# Patient Record
Sex: FEMALE | Race: BLACK OR AFRICAN AMERICAN | Employment: FULL TIME | URBAN - METROPOLITAN AREA
[De-identification: names, ages, dates, MRNs, and addresses within clinical notes are randomized per-mention and may not be internally consistent; named-entity substitution may affect disease eponyms.]

---

## 2017-01-06 ENCOUNTER — TELEPHONE (OUTPATIENT)
Dept: INTERNAL MEDICINE CLINIC | Age: 52
End: 2017-01-06

## 2017-01-06 RX ORDER — AZITHROMYCIN 250 MG/1
TABLET, FILM COATED ORAL
Qty: 6 TAB | Refills: 0 | Status: SHIPPED | OUTPATIENT
Start: 2017-01-06 | End: 2017-01-11

## 2017-01-06 NOTE — TELEPHONE ENCOUNTER
Pt called back. Thyroid labs from Dr. Ozzie Hammans and PTH nml. Multinodular goiter with ? Enlarged parathyroid gland. REferred to [facility]. Also remains sick. Sore throat, hoarse and cough since prior to Xmas. Saw Brengel. Taking saline ns, mucinex, cough syrup etc.  Added zpack.

## 2017-03-09 LAB — CREATININE, EXTERNAL: 0.64

## 2017-04-11 ENCOUNTER — OFFICE VISIT (OUTPATIENT)
Dept: INTERNAL MEDICINE CLINIC | Age: 52
End: 2017-04-11

## 2017-04-11 VITALS
BODY MASS INDEX: 26.03 KG/M2 | TEMPERATURE: 98.7 F | HEIGHT: 66 IN | HEART RATE: 83 BPM | WEIGHT: 162 LBS | OXYGEN SATURATION: 98 % | RESPIRATION RATE: 17 BRPM | DIASTOLIC BLOOD PRESSURE: 82 MMHG | SYSTOLIC BLOOD PRESSURE: 130 MMHG

## 2017-04-11 DIAGNOSIS — I10 ESSENTIAL HYPERTENSION: Primary | ICD-10-CM

## 2017-04-11 DIAGNOSIS — J30.1 SEASONAL ALLERGIC RHINITIS DUE TO POLLEN: ICD-10-CM

## 2017-04-11 RX ORDER — LORATADINE 10 MG/1
10 TABLET ORAL
COMMUNITY
End: 2021-04-30 | Stop reason: ALTCHOICE

## 2017-04-11 NOTE — MR AVS SNAPSHOT
Visit Information Date & Time Provider Department Dept. Phone Encounter #  
 4/11/2017  3:00 PM Anmol Posada MD [facility] [phone] [id number] Your Appointments 9/7/2017  9:20 AM  
COMPLETE 40 with Anmol Posada MD  
[facility] [address]) Appt Note: [address] [facility] [address]  
[facility] [address] [facility] [place] [address] [facility] [id number] Upcoming Health Maintenance Date Due  
 PAP AKA CERVICAL CYTOLOGY 10/23/2018 BREAST CANCER SCRN MAMMOGRAM 11/3/2018 COLONOSCOPY 12/21/2021 DTaP/Tdap/Td series (2 - Td) 2/12/2026 Allergies as of 4/11/2017  Review Complete On: 4/11/2017 By: Anmol Posada MD  
  
 Severity Noted Reaction Type Reactions Naprosyn [Naproxen] Medium 06/09/2014   Side Effect Other (comments) GI distress and loose stools Hydrochlorothiazide  12/21/2011    Other (comments) Lightheaded/dizzy Macrobid [Nitrofurantoin Monohyd/m-cryst]  12/22/2011    Nausea Only Sulfa (Sulfonamide Antibiotics)  12/21/2011    Unknown (comments) Current Immunizations  Never Reviewed Name Date Tdap 2/12/2016 Not reviewed this visit You Were Diagnosed With   
  
 Codes Comments Essential hypertension    -  Primary ICD-10-CM: I10 
ICD-9-CM: 401.9 Vitals BP Pulse Temp Resp Height(growth percentile) Weight(growth percentile) 130/82 83 98.7 °F (37.1 °C) (Oral) 17 5' 6\" (1.676 m) 162 lb (73.5 kg) SpO2 BMI OB Status Smoking Status 98% 26.15 kg/m2 Menopause Never Smoker Vitals History BMI and BSA Data Body Mass Index Body Surface Area  
 26.15 kg/m 2 1.85 m 2 Preferred Pharmacy Pharmacy Name Phone [id number] [place], [address] Sheila Chávez [phone] Your Updated Medication List  
  
   
 This list is accurate as of: 17  3:44 PM.  Always use your most recent med list.  
  
  
  
  
 ALPRAZolam 0.5 mg tablet Commonly known as:  Norma Baker Take 0.5-1 Tabs by mouth two (2) times daily as needed for Anxiety. Max Daily Amount: 1 mg. CLARITIN 10 mg tablet Generic drug:  loratadine Take 10 mg by mouth.  
  
 lisinopril 40 mg tablet Commonly known as:  PRINIVIL, ZESTRIL  
TAKE 1 TABLET BY MOUTH ONCE DAILY  
  
 multivitamin tablet Commonly known as:  ONE A DAY Take 1 Tab by mouth daily. NASACORT AQ 55 mcg nasal inhaler Generic drug:  triamcinolone 2 Sprays daily. We Performed the Following LIPID PANEL [04039 CPT(R)] METABOLIC PANEL, COMPREHENSIVE [10023 CPT(R)] Patient Instructions MyChart Activation Thank you for requesting access to SpaBooker. Please follow the instructions below to securely access and download your online medical record. SpaBooker allows you to send messages to your doctor, view your test results, renew your prescriptions, schedule appointments, and more. How Do I Sign Up? 1. In your internet browser, go to [email or web address] 
2. Click on the First Time User? Click Here link in the Sign In box. You will be redirect to the New Member Sign Up page. 3. Enter your SpaBooker Access Code exactly as it appears below. You will not need to use this code after youve completed the sign-up process. If you do not sign up before the expiration date, you must request a new code. SpaBooker Access Code: [email or web address] Expires: 7/10/2017  3:07 PM (This is the date your SpaBooker access code will ) 4. Enter the last four digits of your Social Security Number (xxxx) and Date of Birth (mm/dd/yyyy) as indicated and click Submit. You will be taken to the next sign-up page. 5. Create a SpaBooker ID. This will be your SpaBooker login ID and cannot be changed, so think of one that is secure and easy to remember. 6. Create a [employer]RS[employer] password. You can change your password at any time. 7. Enter your Password Reset Question and Answer. This can be used at a later time if you forget your password. 8. Enter your e-mail address. You will receive e-mail notification when new information is available in [address] 9. Click Sign Up. You can now view and download portions of your medical record. 10. Click the Download Summary menu link to download a portable copy of your medical information. Additional Information If you have questions, please visit the Frequently Asked Questions section of the [employer] website at [email or web address] Remember, [employer]oupe is NOT to be used for urgent needs. For medical emergencies, dial 911. Introducing [facility]! Arun Vee introduces [employer] patient portal. Now you can access parts of your medical record, email your doctor's office, and request medication refills online. 1. In your internet browser, go to [email or web address] 2. Click on the First Time User? Click Here link in the Sign In box. You will see the New Member Sign Up page. 3. Enter your TRSB [employer]oupe Access Code exactly as it appears below. You will not need to use this code after youve completed the sign-up process. If you do not sign up before the expiration date, you must request a new code. · [employer] Access Code: [email or web address] Expires: 7/10/2017  3:07 PM 
 
4. Enter the last four digits of your Social Security Number (xxxx) and Date of Birth (mm/dd/yyyy) as indicated and click Submit. You will be taken to the next sign-up page. 5. Create a T[employer]B Groupe ID. This will be your T[employer] login ID and cannot be changed, so think of one that is secure and easy to remember. 6. Create a [employer]oupe password. You can change your password at any time. 7. Enter your Password Reset Question and Answer. This can be used at a later time if you forget your password. 8. Enter your e-mail address. You will receive e-mail notification when new information is available in [address] 9. Click Sign Up. You can now view and download portions of your medical record. 10. Click the Download Summary menu link to download a portable copy of your medical information. If you have questions, please visit the Frequently Asked Questions section of the PeerPong website. Remember, PeerPong is NOT to be used for urgent needs. For medical emergencies, dial 911. Now available from your iPhone and Android! Please provide this summary of care documentation to your next provider. Your primary care clinician is listed as NICHOLE ORR. If you have any questions after today's visit, please call [phone].

## 2017-04-11 NOTE — PROGRESS NOTES
Reviewed record in preparation for visit and have obtained necessary documentation. Identified pt with two pt identifiers(name and ). There are no preventive care reminders to display for this patient. Chief Complaint   Patient presents with    Other     needs form filled out and wants to have her lungs rechecked        Wt Readings from Last 3 Encounters:   17 162 lb (73.5 kg)   16 161 lb (73 kg)   16 161 lb 12.8 oz (73.4 kg)     Temp Readings from Last 3 Encounters:   17 98.7 °F (37.1 °C) (Oral)   16 98.8 °F (37.1 °C) (Oral)   16 98.9 °F (37.2 °C) (Oral)     BP Readings from Last 3 Encounters:   17 152/87   16 142/90   16 152/90     Pulse Readings from Last 3 Encounters:   17 83   16 95   16 83           Learning Assessment:  :     Learning Assessment 2017   PRIMARY LEARNER Patient Patient Patient Patient   HIGHEST LEVEL OF EDUCATION - PRIMARY LEARNER  4 YEARS OF COLLEGE 4 YEARS OF COLLEGE 4 YEARS OF COLLEGE 4 YEARS OF COLLEGE   BARRIERS PRIMARY LEARNER NONE NONE NONE NONE   CO-LEARNER CAREGIVER No No No -   PRIMARY LANGUAGE ENGLISH ENGLISH ENGLISH ENGLISH    NEED No No - -   LEARNER PREFERENCE PRIMARY LISTENING DEMONSTRATION READING READING   ANSWERED BY patient patient patient self   RELATIONSHIP SELF SELF SELF SELF       Depression Screening:  :     PHQ 2 / 9, over the last two weeks 2017   Little interest or pleasure in doing things Not at all   Feeling down, depressed or hopeless Not at all   Total Score PHQ 2 0       Fall Risk Assessment:  :     No flowsheet data found. Abuse Screening:  :     Abuse Screening Questionnaire 2017   Do you ever feel afraid of your partner? N N N   Are you in a relationship with someone who physically or mentally threatens you? N N N   Is it safe for you to go home?  [facility]       Coordination of Care Questionnaire:  : 1) Have you been to an emergency room, urgent care clinic since your last visit? no   Hospitalized since your last visit? no             2) Have you seen or consulted any other health care providers outside of [address] [place] [address] since your last visit? no  (Include any pap smears or colon screenings in this section.)    3) Do you have an Advance Directive on file? no    4) Are you interested in receiving information on Advance Directives? YES      Patient is accompanied by self I have received verbal consent from Taylor Leos to discuss any/all medical information while they are present in the room.

## 2017-04-11 NOTE — PROGRESS NOTES
HISTORY OF PRESENT ILLNESS  Sibley Jacinta is a 46 y.o. female. HPI   Had bad respiratory infection. Coughed from end of December until March. Better now.  :Last month could still have some mucous when coughed. Back to exercise without huerta/sob.  nml stamina. Had f/u with Dr. Isabel Li re multinodular thyroid and ? Enlarged parathyroid gland. Lab work normal.  Following with us yearly. Needs form completed for work.   htn - has been checking at home. Running 130/80. Denies chest pain or sob. Current Outpatient Prescriptions:     loratadine (CLARITIN) 10 mg tablet, Take 10 mg by mouth., Disp: , Rfl:     multivitamin (ONE A DAY) tablet, Take 1 Tab by mouth daily. , Disp: , Rfl:     ALPRAZolam (XANAX) 0.5 mg tablet, Take 0.5-1 Tabs by mouth two (2) times daily as needed for Anxiety. Max Daily Amount: 1 mg., Disp: 30 Tab, Rfl: 1    lisinopril (PRINIVIL, ZESTRIL) 40 mg tablet, TAKE 1 TABLET BY MOUTH ONCE DAILY, Disp: 90 Tab, Rfl: 3    triamcinolone (NASACORT AQ) 55 mcg nasal inhaler, 2 Sprays daily. , Disp: , Rfl:     Visit Vitals    /82    Pulse 83    Temp 98.7 °F (37.1 °C) (Oral)    Resp 17    Ht 5' 6\" (1.676 m)    Wt 162 lb (73.5 kg)    SpO2 98%    BMI 26.15 kg/m2     ROS  See above  Physical Exam   Constitutional: She appears well-developed and well-nourished. HENT:   Head: Normocephalic and atraumatic. Nares - edematous with erythematous  OP - post nasal drainage   Neck: Neck supple. Cardiovascular: Normal rate, regular rhythm and normal heart sounds. Exam reveals no gallop and no friction rub. No murmur heard. Pulmonary/Chest: Effort normal and breath sounds normal.   Musculoskeletal: She exhibits no edema. Lymphadenopathy:     She has no cervical adenopathy. Vitals reviewed. ASSESSMENT and PLAN  HTN - controlled, cont same. Check labs  Allergic rhinitis - symptoms increasing. Restart prn meds. 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       Orders Placed This Encounter    METABOLIC PANEL, COMPREHENSIVE    LIPID PANEL    loratadine (CLARITIN) 10 mg tablet     Follow-up Disposition: Not on File

## 2017-04-11 NOTE — PATIENT INSTRUCTIONS
Astro Gaming Activation    Thank you for requesting access to [employer]. Please follow the instructions below to securely access and download your online medical record. Ast[employer] Gaming allows you to send messages to your doctor, view your test results, renew your prescriptions, schedule appointments, and more. How Do I Sign Up? 1. In your internet browser, go to [email or web address]  2. Click on the First Time User? Click Here link in the Sign In box. You will be redirect to the New Member Sign Up page. 3. Enter your Astro Gaming Access Code exactly as it appears below. You will not need to use this code after youve completed the sign-up process. If you do not sign up before the expiration date, you must request a new code. Astro Gaming Access Code: [email or web address]  Expires: 7/10/2017  3:07 PM (This is the date your Astro Gaming access code will )    4. Enter the last four digits of your Social Security Number (xxxx) and Date of Birth (mm/dd/yyyy) as indicated and click Submit. You will be taken to the next sign-up page. 5. Create a Astro Gaming ID. This will be your Astro Gaming login ID and cannot be changed, so think of one that is secure and easy to remember. 6. Create a Astro Gaming password. You can change your password at any time. 7. Enter your Password Reset Question and Answer. This can be used at a later time if you forget your password. 8. Enter your e-mail address. You will receive e-mail notification when new information is available in [address] 9. Click Sign Up. You can now view and download portions of your medical record. 10. Click the Download Summary menu link to download a portable copy of your medical information. Additional Information    If you have questions, please visit the Frequently Asked Questions section of the Astro Gaming website at [email or web address] [email or web address] Remember, Astro Gaming is NOT to be used for urgent needs. For medical emergencies, dial 911.

## 2017-05-04 ENCOUNTER — OFFICE VISIT (OUTPATIENT)
Dept: INTERNAL MEDICINE CLINIC | Age: 52
End: 2017-05-04

## 2017-05-04 VITALS
SYSTOLIC BLOOD PRESSURE: 150 MMHG | OXYGEN SATURATION: 98 % | HEART RATE: 88 BPM | DIASTOLIC BLOOD PRESSURE: 87 MMHG | WEIGHT: 160.2 LBS | TEMPERATURE: 98.6 F | RESPIRATION RATE: 16 BRPM | HEIGHT: 66 IN | BODY MASS INDEX: 25.75 KG/M2

## 2017-05-04 DIAGNOSIS — J01.10 ACUTE NON-RECURRENT FRONTAL SINUSITIS: ICD-10-CM

## 2017-05-04 DIAGNOSIS — M62.838 NECK MUSCLE SPASM: Primary | ICD-10-CM

## 2017-05-04 RX ORDER — DICLOFENAC SODIUM 75 MG/1
75 TABLET, DELAYED RELEASE ORAL 2 TIMES DAILY
Qty: 40 TAB | Refills: 1 | Status: SHIPPED | OUTPATIENT
Start: 2017-05-04 | End: 2017-12-23 | Stop reason: SDUPTHER

## 2017-05-04 RX ORDER — RANITIDINE 300 MG/1
TABLET ORAL
Refills: 5 | COMMUNITY
Start: 2017-04-09 | End: 2019-11-11 | Stop reason: ALTCHOICE

## 2017-05-04 RX ORDER — CYCLOBENZAPRINE HCL 10 MG
10 TABLET ORAL
Qty: 20 TAB | Refills: 0 | Status: SHIPPED | OUTPATIENT
Start: 2017-05-04 | End: 2017-09-07 | Stop reason: ALTCHOICE

## 2017-05-04 RX ORDER — AZITHROMYCIN 250 MG/1
TABLET, FILM COATED ORAL
Qty: 6 TAB | Refills: 0 | Status: SHIPPED | OUTPATIENT
Start: 2017-05-04 | End: 2017-05-09

## 2017-05-04 NOTE — PATIENT INSTRUCTIONS
Neck: Exercises  Your Care Instructions  Here are some examples of typical rehabilitation exercises for your condition. Start each exercise slowly. Ease off the exercise if you start to have pain. Your doctor or physical therapist will tell you when you can start these exercises and which ones will work best for you. How to do the exercises  Note: Stretching should make you feel a gentle stretch, but no pain. Stop any strengthening exercise that makes pain worse. Neck stretch    1. This stretch works best if you keep your shoulder down as you lean away from it. To help you remember to do this, start by relaxing your shoulders and lightly holding on to your thighs or your chair. 2. Tilt your head toward your shoulder and hold for 15 to 30 seconds. Let the weight of your head stretch your muscles. 3. If you would like a little added stretch, use your hand to gently and steadily pull your head toward your shoulder. For example, keeping your right shoulder down, lean your head to the left. 4. Repeat 2 to 4 times toward each shoulder. Diagonal neck stretch    1. Turn your head slightly toward the direction you will be stretching, and tilt your head diagonally toward your chest and hold for 15 to 30 seconds. 2. If you would like a little added stretch, use your hand to gently and steadily pull your head forward on the diagonal.  3. Repeat 2 to 4 times toward each side. Dorsal glide stretch    1. Sit or stand tall and look straight ahead. 2. Slowly tuck your chin as you glide your head backward over your body  3. Hold for a count of 6, and then relax for up to 10 seconds. 4. Repeat 8 to 12 times. Note: The dorsal glide stretches the back of the neck. If you feel pain, do not glide so far back. Some people find this exercise easier to do while lying on their backs with an ice pack on the neck. Chest and shoulder stretch    1.  Sit or stand tall and glide your head backward as in the dorsal glide stretch. 2. Raise both arms so that your hands are next to your ears. 3. Take a deep breath, and as you breathe out, lower your elbows down and behind your back. You will feel your shoulder blades slide down and together, and at the same time you will feel a stretch across your chest and the front of your shoulders. 4. Hold for about 6 seconds, and then relax for up to 10 seconds. 5. Repeat 8 to 12 times. Strengthening: Hands on head    1. Move your head backward, forward, and side to side against gentle pressure from your hands, holding each position for about 6 seconds. 2. Repeat 8 to 12 times. Follow-up care is a key part of your treatment and safety. Be sure to make and go to all appointments, and call your doctor if you are having problems. It's also a good idea to know your test results and keep a list of the medicines you take. Where can you learn more? Go to http://jackie-stacey.info/. Enter P975 in the search box to learn more about \"Neck: Exercises. \"  Current as of: May 23, 2016  Content Version: 11.2  © 8118-1179 [employer]. Care instructions adapted under license by [employer] (which disclaims liability or warranty for this information). If you have questions about a medical condition or this instruction, always ask your healthcare professional. [address] any warranty or liability for your use of this information.

## 2017-05-04 NOTE — PROGRESS NOTES
Reviewed record in preparation for visit and have obtained necessary documentation. Identified pt with two pt identifiers(name and ). There are no preventive care reminders to display for this patient. Chief Complaint   Patient presents with    Neck Pain     shouler/ear pain,feels like she has swelling in the ear and dizziness        Wt Readings from Last 3 Encounters:   17 160 lb 3.2 oz (72.7 kg)   17 162 lb (73.5 kg)   16 161 lb (73 kg)     Temp Readings from Last 3 Encounters:   17 98.6 °F (37 °C) (Oral)   17 98.7 °F (37.1 °C) (Oral)   16 98.8 °F (37.1 °C) (Oral)     BP Readings from Last 3 Encounters:   17 150/87   17 130/82   16 142/90     Pulse Readings from Last 3 Encounters:   17 88   17 83   16 95           Learning Assessment:  :     Learning Assessment 2017   PRIMARY LEARNER Patient Patient Patient Patient   HIGHEST LEVEL OF EDUCATION - PRIMARY LEARNER  4 YEARS OF COLLEGE 4 YEARS OF COLLEGE 4 YEARS OF COLLEGE 4 YEARS OF COLLEGE   BARRIERS PRIMARY LEARNER NONE NONE NONE NONE   CO-LEARNER CAREGIVER No No No -   PRIMARY LANGUAGE ENGLISH ENGLISH ENGLISH ENGLISH    NEED No No - -   LEARNER PREFERENCE PRIMARY LISTENING DEMONSTRATION READING READING   ANSWERED BY patient patient patient self   RELATIONSHIP SELF SELF SELF SELF       Depression Screening:  :     PHQ over the last two weeks 2017   Little interest or pleasure in doing things Not at all   Feeling down, depressed or hopeless Not at all   Total Score PHQ 2 0       Fall Risk Assessment:  :     No flowsheet data found. Abuse Screening:  :     Abuse Screening Questionnaire 2017   Do you ever feel afraid of your partner? N N N   Are you in a relationship with someone who physically or mentally threatens you? N N N   Is it safe for you to go home?  Roderick Martin       Coordination of Care Questionnaire:  :     1) Have you been to an emergency room, urgent care clinic since your last visit? no   Hospitalized since your last visit? no             2) Have you seen or consulted any other health care providers outside of [address] [place] [address] since your last visit? no  (Include any pap smears or colon screenings in this section.)    3) Do you have an Advance Directive on file? no    4) Are you interested in receiving information on Advance Directives? YES      Patient is accompanied by self I have received verbal consent from Sienna Nowak to discuss any/all medical information while they are present in the room.

## 2017-05-04 NOTE — MR AVS SNAPSHOT
Visit Information Date & Time Provider Department Dept. Phone Encounter #  
 5/4/2017 11:40 AM Trudi Rosas MD [facility] [phone] [id number] Your Appointments 9/7/2017  9:20 AM  
COMPLETE 40 with Trudi Rosas MD  
[facility] [address]) Appt Note: [address] [facility] [address]  
[facility] [address] [facility] [address] [facility] [phone] Upcoming Health Maintenance Date Due INFLUENZA AGE 9 TO ADULT 8/1/2017 PAP AKA CERVICAL CYTOLOGY 10/23/2018 BREAST CANCER SCRN MAMMOGRAM 11/3/2018 COLONOSCOPY 12/21/2021 DTaP/Tdap/Td series (2 - Td) 2/12/2026 Allergies as of 5/4/2017  Review Complete On: 5/4/2017 By: Trudi Rosas MD  
  
 Severity Noted Reaction Type Reactions Naprosyn [Naproxen] Medium 06/09/2014   Side Effect Other (comments) GI distress and loose stools Hydrochlorothiazide  12/21/2011    Other (comments) Lightheaded/dizzy Macrobid [Nitrofurantoin Monohyd/m-cryst]  12/22/2011    Nausea Only Sulfa (Sulfonamide Antibiotics)  12/21/2011    Unknown (comments) Current Immunizations  Never Reviewed Name Date Tdap 2/12/2016 Not reviewed this visit You Were Diagnosed With   
  
 Codes Comments Neck muscle spasm    -  Primary ICD-10-CM: T97.140 ICD-9-CM: 728.85 Acute non-recurrent frontal sinusitis     ICD-10-CM: J01.10 ICD-9-CM: 846.4 Vitals BP Pulse Temp Resp Height(growth percentile) Weight(growth percentile) 150/87 (BP 1 Location: Left arm, BP Patient Position: Sitting) 88 98.6 °F (37 °C) (Oral) 16 5' 6\" (1.676 m) 160 lb 3.2 oz (72.7 kg) SpO2 BMI OB Status Smoking Status 98% 25.86 kg/m2 Menopause Never Smoker Vitals History BMI and BSA Data Body Mass Index Body Surface Area  
 25.86 kg/m 2 1.84 m 2 Preferred Pharmacy Pharmacy Name Phone [id number]- [place], [address] Gumaro Castro [phone] Your Updated Medication List  
  
   
This list is accurate as of: 5/4/17 12:14 PM.  Always use your most recent med list.  
  
  
  
  
 ALPRAZolam 0.5 mg tablet Commonly known as:  Peola Lucretia Take 0.5-1 Tabs by mouth two (2) times daily as needed for Anxiety. Max Daily Amount: 1 mg.  
  
 azithromycin 250 mg tablet Commonly known as:  Eliezer Davidson Take as directed. CLARITIN 10 mg tablet Generic drug:  loratadine Take 10 mg by mouth. cyclobenzaprine 10 mg tablet Commonly known as:  FLEXERIL Take 1 Tab by mouth nightly as needed for Muscle Spasm(s). diclofenac EC 75 mg EC tablet Commonly known as:  VOLTAREN Take 1 Tab by mouth two (2) times a day. lisinopril 40 mg tablet Commonly known as:  PRINIVIL, ZESTRIL  
TAKE 1 TABLET BY MOUTH ONCE DAILY  
  
 multivitamin tablet Commonly known as:  ONE A DAY Take 1 Tab by mouth daily. NASACORT AQ 55 mcg nasal inhaler Generic drug:  triamcinolone 2 Sprays daily. raNITIdine 300 mg tablet Commonly known as:  ZANTAC  
1 TABLET BY MOUTH AT BEDTIME Prescriptions Sent to Pharmacy Refills  
 azithromycin (ZITHROMAX) 250 mg tablet 0 Sig: Take as directed. Class: Normal  
 Pharmacy: [facility]/pharmacy [address] [place] [address] Ph #: [phone]  
 diclofenac EC (VOLTAREN) 75 mg EC tablet 1 Sig: Take 1 Tab by mouth two (2) times a day. Class: Normal  
 [facility] [address] [place] [address] Ph #: [phone] Route: Oral  
 cyclobenzaprine (FLEXERIL) 10 mg tablet 0 Sig: Take 1 Tab by mouth nightly as needed for Muscle Spasm(s). Class: Normal  
 Pharmacy: [address] Ph #: [phone] Route: Oral  
  
Patient Instructions Neck: Exercises Your Care Instructions Here are some examples of typical rehabilitation exercises for your condition. Start each exercise slowly. Ease off the exercise if you start to have pain. Your doctor or physical therapist will tell you when you can start these exercises and which ones will work best for you. How to do the exercises Note: Stretching should make you feel a gentle stretch, but no pain. Stop any strengthening exercise that makes pain worse. Neck stretch 1. This stretch works best if you keep your shoulder down as you lean away from it. To help you remember to do this, start by relaxing your shoulders and lightly holding on to your thighs or your chair. 2. Tilt your head toward your shoulder and hold for 15 to 30 seconds. Let the weight of your head stretch your muscles. 3. If you would like a little added stretch, use your hand to gently and steadily pull your head toward your shoulder. For example, keeping your right shoulder down, lean your head to the left. 4. Repeat 2 to 4 times toward each shoulder. Diagonal neck stretch 1. Turn your head slightly toward the direction you will be stretching, and tilt your head diagonally toward your chest and hold for 15 to 30 seconds. 2. If you would like a little added stretch, use your hand to gently and steadily pull your head forward on the diagonal. 
3. Repeat 2 to 4 times toward each side. Dorsal glide stretch 1. Sit or stand tall and look straight ahead. 2. Slowly tuck your chin as you glide your head backward over your body 3. Hold for a count of 6, and then relax for up to 10 seconds. 4. Repeat 8 to 12 times. Note: The dorsal glide stretches the back of the neck. If you feel pain, do not glide so far back. Some people find this exercise easier to do while lying on their backs with an ice pack on the neck. Chest and shoulder stretch 1. Sit or stand tall and glide your head backward as in the dorsal glide stretch. 2. Raise both arms so that your hands are next to your ears. 3. Take a deep breath, and as you breathe out, lower your elbows down and behind your back. You will feel your shoulder blades slide down and together, and at the same time you will feel a stretch across your chest and the front of your shoulders. 4. Hold for about 6 seconds, and then relax for up to 10 seconds. 5. Repeat 8 to 12 times. Strengthening: Hands on head 1. Move your head backward, forward, and side to side against gentle pressure from your hands, holding each position for about 6 seconds. 2. Repeat 8 to 12 times. Follow-up care is a key part of your treatment and safety. Be sure to make and go to all appointments, and call your doctor if you are having problems. It's also a good idea to know your test results and keep a list of the medicines you take. Where can you learn more? Go to http://jackie-stacey.info/. Enter P975 in the search box to learn more about \"Neck: Exercises. \" Current as of: May 23, 2016 Content Version: 11.2 © 6598-8541 Healthwise, Incorporated. Care instructions adapted under license by [employer] (which disclaims liability or warranty for this information). If you have questions about a medical condition or this instruction, always ask your healthcare professional. [address] any warranty or liability for your use of this information. Introducing [facility] & HE[facility]! Lianne Burks introduces [employer] patient portal. Now you can access parts of your medical record, email your doctor's office, and request medication refills online. 1. In your internet browser, go to [email or web address] 2. Click on the First Time User? Click Here link in the Sign In box. You will see the New Member Sign Up page. 3. Enter your [employer] Access Code exactly as it appears below.  You will not need to use this code after youve completed the sign-up process. If you do not sign up before the expiration date, you must request a new code. · loanDepot Access Code: [email or web address] Expires: 7/10/2017  3:07 PM 
 
4. Enter the last four digits of your Social Security Number (xxxx) and Date of Birth (mm/dd/yyyy) as indicated and click Submit. You will be taken to the next sign-up page. 5. Create a loanDepot ID. This will be your loanDepot login ID and cannot be changed, so think of one that is secure and easy to remember. 6. Create a loanDepot password. You can change your password at any time. 7. Enter your Password Reset Question and Answer. This can be used at a later time if you forget your password. 8. Enter your e-mail address. You will receive e-mail notification when new information is available in [address] 9. Click Sign Up. You can now view and download portions of your medical record. 10. Click the Download Summary menu link to download a portable copy of your medical information. If you have questions, please visit the Frequently Asked Questions section of the loanDepot website. Remember, [employer]ot is NOT to be used for urgent needs. For medical emergencies, dial 911. Now available from your iPhone and Android! Please provide this summary of care documentation to your next provider. Your primary care clinician is listed as NICHOLE ORR. If you have any questions after today's visit, please call [phone].

## 2017-05-04 NOTE — PROGRESS NOTES
HISTORY OF PRESENT ILLNESS  Sienna Nowak is a 46 y.o. female. HPI  Pain and swelling left neck and trapezius muscles x 10 days. Causing dizziness. Left ear with discomfort. No room spinning but feels needs to hold onto walls. No nausea. Frontal sinus pressure with nasal congestion. No fevers or chills. Painful turning neck. Using nasocort, saline ns and claritin. Heat made neck worse. Using ice. Current Outpatient Prescriptions:     raNITIdine (ZANTAC) 300 mg tablet, 1 TABLET BY MOUTH AT BEDTIME, Disp: , Rfl: 5    loratadine (CLARITIN) 10 mg tablet, Take 10 mg by mouth., Disp: , Rfl:     multivitamin (ONE A DAY) tablet, Take 1 Tab by mouth daily. , Disp: , Rfl:     ALPRAZolam (XANAX) 0.5 mg tablet, Take 0.5-1 Tabs by mouth two (2) times daily as needed for Anxiety. Max Daily Amount: 1 mg., Disp: 30 Tab, Rfl: 1    lisinopril (PRINIVIL, ZESTRIL) 40 mg tablet, TAKE 1 TABLET BY MOUTH ONCE DAILY, Disp: 90 Tab, Rfl: 3    triamcinolone (NASACORT AQ) 55 mcg nasal inhaler, 2 Sprays daily. , Disp: , Rfl:     Visit Vitals    /87 (BP 1 Location: Left arm, BP Patient Position: Sitting)    Pulse 88    Temp 98.6 °F (37 °C) (Oral)    Resp 16    Ht 5' 6\" (1.676 m)    Wt 160 lb 3.2 oz (72.7 kg)    SpO2 98%    BMI 25.86 kg/m2       ROS  See above  Physical Exam   Constitutional: She appears well-developed and well-nourished. HENT:   Head: Normocephalic and atraumatic. Right Ear: External ear normal.   Left Ear: External ear normal.   Tenderness bilat frontal and left maxillary sinus  OP - mild erythema and post nasal drainage  Nares - thick discharge, swollen turbinates   Neck: Neck supple. No thyromegaly present. Tenderness and knotting left trapezius muscle  Paraspinal muscles and cervical spine nontender  Decreased rom in all directions   Cardiovascular: Normal rate, regular rhythm and normal heart sounds. Exam reveals no gallop and no friction rub. No murmur heard.   Pulmonary/Chest: Effort normal and breath sounds normal.   Lymphadenopathy:     She has no cervical adenopathy. Vitals reviewed.       ASSESSMENT and PLAN  Acute sinusitis- add mucinex, zpack, continue regular allergy medications  Neck spasm/strain - stretching exercises, flexeril and nsaids, recommended massage  Orders Placed This Encounter    raNITIdine (ZANTAC) 300 mg tablet    azithromycin (ZITHROMAX) 250 mg tablet    diclofenac EC (VOLTAREN) 75 mg EC tablet    cyclobenzaprine (FLEXERIL) 10 mg tablet     Follow-up Disposition: Not on File

## 2017-05-05 LAB
ALBUMIN SERPL-MCNC: 4.5 G/DL (ref 3.5–5.5)
ALBUMIN/GLOB SERPL: 1.7 {RATIO} (ref 1.2–2.2)
ALP SERPL-CCNC: 75 IU/L (ref 39–117)
ALT SERPL-CCNC: 14 IU/L (ref 0–32)
AST SERPL-CCNC: 22 IU/L (ref 0–40)
BILIRUB SERPL-MCNC: 0.3 MG/DL (ref 0–1.2)
BUN SERPL-MCNC: 10 MG/DL (ref 6–24)
BUN/CREAT SERPL: 15 (ref 9–23)
CALCIUM SERPL-MCNC: 9.4 MG/DL (ref 8.7–10.2)
CHLORIDE SERPL-SCNC: 101 MMOL/L (ref 96–106)
CHOLEST SERPL-MCNC: 219 MG/DL (ref 100–199)
CO2 SERPL-SCNC: 22 MMOL/L (ref 18–29)
CREAT SERPL-MCNC: 0.65 MG/DL (ref 0.57–1)
GLOBULIN SER CALC-MCNC: 2.6 G/DL (ref 1.5–4.5)
GLUCOSE SERPL-MCNC: 89 MG/DL (ref 65–99)
HDLC SERPL-MCNC: 102 MG/DL
INTERPRETATION, 910389: NORMAL
LDLC SERPL CALC-MCNC: 109 MG/DL (ref 0–99)
POTASSIUM SERPL-SCNC: 4.4 MMOL/L (ref 3.5–5.2)
PROT SERPL-MCNC: 7.1 G/DL (ref 6–8.5)
SODIUM SERPL-SCNC: 143 MMOL/L (ref 134–144)
TRIGL SERPL-MCNC: 42 MG/DL (ref 0–149)
VLDLC SERPL CALC-MCNC: 8 MG/DL (ref 5–40)

## 2017-08-24 RX ORDER — LISINOPRIL 40 MG/1
TABLET ORAL
Qty: 90 TAB | Refills: 3 | Status: SHIPPED | OUTPATIENT
Start: 2017-08-24 | End: 2018-06-19 | Stop reason: SDUPTHER

## 2017-09-07 ENCOUNTER — OFFICE VISIT (OUTPATIENT)
Dept: INTERNAL MEDICINE CLINIC | Age: 52
End: 2017-09-07

## 2017-09-07 VITALS
DIASTOLIC BLOOD PRESSURE: 68 MMHG | OXYGEN SATURATION: 99 % | HEIGHT: 66 IN | TEMPERATURE: 98.5 F | SYSTOLIC BLOOD PRESSURE: 132 MMHG | WEIGHT: 161.6 LBS | BODY MASS INDEX: 25.97 KG/M2 | RESPIRATION RATE: 18 BRPM | HEART RATE: 66 BPM

## 2017-09-07 DIAGNOSIS — Z00.00 ROUTINE GENERAL MEDICAL EXAMINATION AT A HEALTH CARE FACILITY: Primary | ICD-10-CM

## 2017-09-07 NOTE — PROGRESS NOTES
Subjective:   46 y.o. female for Well Woman Check. Her gyne and breast care is done elsewhere by her Ob-Gyne physician. UTD with Dr. Su Button Page. Last pelvic 10/2016. Had mammogram in November as well. Patient Active Problem List    Diagnosis Date Noted    Multinodular goiter 12/19/2016    Nephrolithiasis 04/15/2015    HTN (hypertension) 12/22/2011    Allergic rhinitis 12/22/2011     Current Outpatient Prescriptions   Medication Sig Dispense Refill    lisinopril (PRINIVIL, ZESTRIL) 40 mg tablet TAKE 1 TABLET BY MOUTH ONCE DAILY 90 Tab 3    diclofenac EC (VOLTAREN) 75 mg EC tablet Take 1 Tab by mouth two (2) times a day. (Patient taking differently: Take 75 mg by mouth daily as needed.) 40 Tab 1    loratadine (CLARITIN) 10 mg tablet Take 10 mg by mouth.  multivitamin (ONE A DAY) tablet Take 1 Tab by mouth daily.  triamcinolone (NASACORT AQ) 55 mcg nasal inhaler 2 Sprays daily.       raNITIdine (ZANTAC) 300 mg tablet 1 TABLET BY MOUTH AT BEDTIME  5     Allergies   Allergen Reactions    Naprosyn [Naproxen] Other (comments)     GI distress and loose stools    Hydrochlorothiazide Other (comments)     Lightheaded/dizzy    Macrobid [Nitrofurantoin Monohyd/M-Cryst] Nausea Only    Sulfa (Sulfonamide Antibiotics) Unknown (comments)     Past Medical History:   Diagnosis Date    Anxiety     Calculus of kidney 2014    seen on ultrasound - no sxs ever    Environmental allergies     GERD (gastroesophageal reflux disease)     hiatal hernia    Hypertension      Past Surgical History:   Procedure Laterality Date    HX COLONOSCOPY  12/2011    Eran, f/u 10 years    NEUROLOGICAL PROCEDURE UNLISTED  2001    lumbar hodge., redo w/L5-S1 fusion (11/02-Ken)     Family History   Problem Relation Age of Onset    Cancer Father      prostate    Hypertension Father     Diabetes Father      Social History   Substance Use Topics    Smoking status: Never Smoker    Smokeless tobacco: Never Used   [place] Alcohol use No        Specific concerns today:   Neck pain with some tightness on the left. Saw at Dr. Pradhan Superior office. Trying to get some massages but hard because started a new job. Stretching area. Recommended PT. Had some radiation of tingling left arm but better now. Had lifeline screening. Continued mild carotid stenosis. Stomach is doing well. Has not required Zantac recently. Allergies - nose continues to be congested. Had 2 nose bleeds in the last year. Review of Systems  Constitutional: negative  Eyes: negative  Ears, nose, mouth, throat, and face: negative  Respiratory: negative  Cardiovascular: negative  Gastrointestinal: negative  Genitourinary:negative  Integument/breast: negative  Hematologic/lymphatic: negative  Musculoskeletal:negative except for neck pain  Neurological: negative  Behavioral/Psych: negative  Endocrine: negative  Allergic/Immunologic: negative except for allergic rhinitis    Objective:   Blood pressure 132/68, pulse 66, temperature 98.5 °F (36.9 °C), temperature source Oral, resp. rate 18, height 5' 6\" (1.676 m), weight 161 lb 9.6 oz (73.3 kg), SpO2 99 %. Physical Examination:   General appearance - alert, well appearing, and in no distress and oriented to person, place, and time  Mental status - alert, oriented to person, place, and time, normal mood, behavior, speech, dress, motor activity, and thought processes  Eyes - pupils equal and reactive, extraocular eye movements intact  Ears - bilateral TM's and external ear canals normal  Nose - normal and patent, no erythema, discharge or polyps  Mouth - mucous membranes moist, pharynx normal without lesions  Neck - supple, no significant adenopathy, carotids upstroke normal bilaterally, no bruits, thyroid exam: thyroid is normal in size without nodules or tenderness, tenderness with muscle tightening left paraspinal and trapezius muscles. Cspine nontender.   Mildly decreased rom  Lymphatics - no palpable lymphadenopathy, no hepatosplenomegaly  Chest - clear to auscultation, no wheezes, rales or rhonchi, symmetric air entry  Heart - normal rate, regular rhythm, normal S1, S2, no murmurs, rubs, clicks or gallops  Abdomen - soft, nontender, nondistended, no masses or organomegaly  bowel sounds normal  Breasts - breasts appear normal, no suspicious masses, no skin or nipple changes or axillary nodes  Back exam - full range of motion, no tenderness, palpable spasm or pain on motion  Neurological - alert, oriented, normal speech, no focal findings or movement disorder noted  Musculoskeletal - no joint tenderness, deformity or swelling  Extremities - peripheral pulses normal, no pedal edema, no clubbing or cyanosis  Skin - normal coloration and turgor, no rashes, no suspicious skin lesions noted     Assessment/Plan:   47yo female -   htn - controlled, cont same  Neck strain - stretching exercises. Encouraged her to start PT through ortho  HM - labs from May reviewed with pt.    call if any problems  No orders of the defined types were placed in this encounter. Follow-up Disposition:  Return in about 6 months (around 3/7/2018) for htn.

## 2017-09-07 NOTE — MR AVS SNAPSHOT
Visit Information Date & Time Provider Department Dept. Phone Encounter #  
 9/7/2017  9:20 Rita Mcclelland MD [facility] [phone] [id number] Follow-up Instructions Return in about 6 months (around 3/7/2018) for htn. Upcoming Health Maintenance Date Due INFLUENZA AGE 9 TO ADULT 8/1/2017 PAP AKA CERVICAL CYTOLOGY 10/23/2018 BREAST CANCER SCRN MAMMOGRAM 11/3/2018 COLONOSCOPY 12/21/2021 DTaP/Tdap/Td series (2 - Td) 2/12/2026 Allergies as of 9/7/2017  Review Complete On: 9/7/2017 By: Jeannette Duncan MD  
  
 Severity Noted Reaction Type Reactions Naprosyn [Naproxen] Medium 06/09/2014   Side Effect Other (comments) GI distress and loose stools Hydrochlorothiazide  12/21/2011    Other (comments) Lightheaded/dizzy Macrobid [Nitrofurantoin Monohyd/m-cryst]  12/22/2011    Nausea Only Sulfa (Sulfonamide Antibiotics)  12/21/2011    Unknown (comments) Current Immunizations  Never Reviewed Name Date Tdap 2/12/2016 Not reviewed this visit Vitals BP Pulse Temp Resp Height(growth percentile) Weight(growth percentile) 132/68 66 98.5 °F (36.9 °C) (Oral) 18 5' 6\" (1.676 m) 161 lb 9.6 oz (73.3 kg) SpO2 BMI OB Status Smoking Status 99% 26.08 kg/m2 Menopause Never Smoker Vitals History BMI and BSA Data Body Mass Index Body Surface Area 26.08 kg/m 2 1.85 m 2 Preferred Pharmacy Pharmacy Name Phone [id number] [place], [address] Edison Hutchinson [phone] Your Updated Medication List  
  
   
This list is accurate as of: 9/7/17 10:20 AM.  Always use your most recent med list.  
  
  
  
  
 CLARITIN 10 mg tablet Generic drug:  loratadine Take 10 mg by mouth. diclofenac EC 75 mg EC tablet Commonly known as:  VOLTAREN Take 1 Tab by mouth two (2) times a day. lisinopril 40 mg tablet Commonly known as:  Tiarra Julio Cesar  
 TAKE 1 TABLET BY MOUTH ONCE DAILY  
  
 multivitamin tablet Commonly known as:  ONE A DAY Take 1 Tab by mouth daily. NASACORT AQ 55 mcg nasal inhaler Generic drug:  triamcinolone 2 Sprays daily. raNITIdine 300 mg tablet Commonly known as:  ZANTAC  
1 TABLET BY MOUTH AT BEDTIME Follow-up Instructions Return in about 6 months (around 3/7/2018) for htn. Introducing [facility]! Lexie Montiel introduces [employer] patient portal. Now you can access parts of your medical record, email your doctor's office, and request medication refills online. 1. In your internet browser, go to [email or web address] 2. Click on the First Time User? Click Here link in the Sign In box. You will see the New Member Sign Up page. 3. Enter your [employer] Access Code exactly as it appears below. You will not need to use this code after youve completed the sign-up process. If you do not sign up before the expiration date, you must request a new code. · [employer] Access Code: [email or web address] Expires: 12/6/2017 10:20 AM 
 
4. Enter the last four digits of your Social Security Number (xxxx) and Date of Birth (mm/dd/yyyy) as indicated and click Submit. You will be taken to the next sign-up page. 5. Create a [employer] ID. This will be your [employer] login ID and cannot be changed, so think of one that is secure and easy to remember. 6. Create a [employer] password. You can change your password at any time. 7. Enter your Password Reset Question and Answer. This can be used at a later time if you forget your password. 8. Enter your e-mail address. You will receive e-mail notification when new information is available in [address] 9. Click Sign Up. You can now view and download portions of your medical record. 10. Click the Download Summary menu link to download a portable copy of your medical information. If you have questions, please visit the Frequently Asked Questions section of the [employer] website. Remember, [employer] is NOT to be used for urgent needs. For medical emergencies, dial 911. Now available from your iPhone and Android! Please provide this summary of care documentation to your next provider. Your primary care clinician is listed as NICHOLE ORR. If you have any questions after today's visit, please call [phone].

## 2017-09-07 NOTE — PROGRESS NOTES
Iris Bingham is a 46 y.o. female  Chief Complaint   Patient presents with    Complete Physical     1. Have you been to the ER, urgent care clinic since your last visit? Hospitalized since your last visit? No    2. Have you seen or consulted any other health care providers outside of the [facility] [place] since your last visit? Include any pap smears or colon screening. Had neck x ray done of spine by ortho on 7/7/17.

## 2018-01-15 ENCOUNTER — OFFICE VISIT (OUTPATIENT)
Dept: INTERNAL MEDICINE CLINIC | Age: 53
End: 2018-01-15

## 2018-01-15 VITALS
DIASTOLIC BLOOD PRESSURE: 78 MMHG | SYSTOLIC BLOOD PRESSURE: 134 MMHG | WEIGHT: 162.4 LBS | BODY MASS INDEX: 26.1 KG/M2 | TEMPERATURE: 98.2 F | RESPIRATION RATE: 14 BRPM | OXYGEN SATURATION: 98 % | HEIGHT: 66 IN | HEART RATE: 90 BPM

## 2018-01-15 DIAGNOSIS — R04.0 EPISTAXIS: Primary | ICD-10-CM

## 2018-01-15 DIAGNOSIS — J30.1 CHRONIC SEASONAL ALLERGIC RHINITIS DUE TO POLLEN: ICD-10-CM

## 2018-01-15 NOTE — MR AVS SNAPSHOT
Visit Information Date & Time Provider Department Dept. Phone Encounter #  
 1/15/2018  9:00 AM Amirah Unger MD [facility] [phone] [id number] Your Appointments 3/7/2018  9:00 AM  
ROUTINE CARE with Amirah Unger MD  
[facility]) Appt Note: 6 month follow up  
 [address] [facility] [address]  
[facility]. 66. [address] 7 [address] Upcoming Health Maintenance Date Due Influenza Age 5 to Adult 8/1/2017 PAP AKA CERVICAL CYTOLOGY 10/23/2018 BREAST CANCER SCRN MAMMOGRAM 11/9/2019 COLONOSCOPY 12/21/2021 DTaP/Tdap/Td series (2 - Td) 2/12/2026 Allergies as of 1/15/2018  Review Complete On: 1/15/2018 By: Amirah Unger MD  
  
 Severity Noted Reaction Type Reactions Naprosyn [Naproxen] Medium 06/09/2014   Side Effect Other (comments) GI distress and loose stools Hydrochlorothiazide  12/21/2011    Other (comments) Lightheaded/dizzy Macrobid [Nitrofurantoin Monohyd/m-cryst]  12/22/2011    Nausea Only Sulfa (Sulfonamide Antibiotics)  12/21/2011    Unknown (comments) Current Immunizations  Never Reviewed Name Date Tdap 2/12/2016 Not reviewed this visit You Were Diagnosed With   
  
 Codes Comments Epistaxis    -  Primary ICD-10-CM: R04.0 ICD-9-CM: 813. 7 Chronic seasonal allergic rhinitis due to pollen     ICD-10-CM: J30.1 ICD-9-CM: 477.0 Vitals BP Pulse Temp Resp Height(growth percentile) Weight(growth percentile) 134/78 (BP 1 Location: Left arm, BP Patient Position: Sitting) 90 98.2 °F (36.8 °C) (Oral) 14 5' 6\" (1.676 m) 162 lb 6.4 oz (73.7 kg) SpO2 BMI OB Status Smoking Status 98% 26.21 kg/m2 Menopause Never Smoker Vitals History BMI and BSA Data Body Mass Index Body Surface Area  
 26.21 kg/m 2 1.85 m 2 Preferred Pharmacy Pharmacy Name Phone [facility]/PHARMACY #[id number]- [address], [address] [phone] Your Updated Medication List  
  
   
This list is accurate as of: 1/15/18  9:28 AM.  Always use your most recent med list.  
  
  
  
  
 CLARITIN 10 mg tablet Generic drug:  loratadine Take 10 mg by mouth. diclofenac EC 75 mg EC tablet Commonly known as:  VOLTAREN  
TAKE 1 TAB BY MOUTH TWO (2) TIMES A DAY. lisinopril 40 mg tablet Commonly known as:  PRINIVIL, ZESTRIL  
TAKE 1 TABLET BY MOUTH ONCE DAILY  
  
 multivitamin tablet Commonly known as:  ONE A DAY Take 1 Tab by mouth daily. NASACORT AQ 55 mcg nasal inhaler Generic drug:  triamcinolone 2 Sprays daily. raNITIdine 300 mg tablet Commonly known as:  ZANTAC  
1 TABLET BY MOUTH AT BEDTIME Introducing \[facility]\"" & HEALTH SERVICES! [facility] introduces [employer] patient portal. Now you can access parts of your medical record, email your doctor's office, and request medication refills online. 1. In your internet browser, go to [email or web address] 2. Click on the First Time User? Click Here link in the Sign In box. You will see the New Member Sign Up page. 3. Enter your [employer] Access Code exactly as it appears below. You will not need to use this code after youve completed the sign-up process. If you do not sign up before the expiration date, you must request a new code. · [employer] Access Code: Charlene Kuo Expires: 4/15/2018  9:28 AM 
 
4. Enter the last four digits of your Social Security Number (xxxx) and Date of Birth (mm/dd/yyyy) as indicated and click Submit. You will be taken to the next sign-up page. 5. Create a Lynx Sportsweart ID. This will be your [employer] login ID and cannot be changed, so think of one that is secure and easy to remember. 6. Create a [employer] password. You can change your password at any time. 7. Enter your Password Reset Question and Answer.  This can be used at a later time if you forget your password. 8. Enter your e-mail address. You will receive e-mail notification when new information is available in [address] 9. Click Sign Up. You can now view and download portions of your medical record. 10. Click the Download Summary menu link to download a portable copy of your medical information. If you have questions, please visit the Frequently Asked Questions section of the [facility] website. Remember, [facility] is NOT to be used for urgent needs. For medical emergencies, dial 911. Now available from your iPhone and Android! Please provide this summary of care documentation to your next provider. Your primary care clinician is listed as NICHOLE ORR. If you have any questions after today's visit, please call [phone].

## 2018-01-15 NOTE — PROGRESS NOTES
HISTORY OF PRESENT ILLNESS  Jewels Mendes is a 46 y.o. female. HPI  Has had 2-3 nose bleeds since September from left nares. Taking her allergy meds inc Nasonex, saline ns, Netti pot and Claritin. Last occurred in December and lasted for 20 minutes. Previous bleeds were shorter. Feels a bit woozy post nose bleed. Next day feels there is a large clot in her nares. Some sinus congestion - usually left side as well. Running a humidifier in her bedroom at night. In PT for her left neck and shoulder. Saw Ortho last week - felt was a pinched nerve and recommended MRI. Has had 2 medrol dose packs - started second yesterday. Feels PT is helping. Current Outpatient Prescriptions:     diclofenac EC (VOLTAREN) 75 mg EC tablet, TAKE 1 TAB BY MOUTH TWO (2) TIMES A DAY., Disp: 40 Tab, Rfl: 1    lisinopril (PRINIVIL, ZESTRIL) 40 mg tablet, TAKE 1 TABLET BY MOUTH ONCE DAILY, Disp: 90 Tab, Rfl: 3    raNITIdine (ZANTAC) 300 mg tablet, 1 TABLET BY MOUTH AT BEDTIME, Disp: , Rfl: 5    loratadine (CLARITIN) 10 mg tablet, Take 10 mg by mouth., Disp: , Rfl:     multivitamin (ONE A DAY) tablet, Take 1 Tab by mouth daily. , Disp: , Rfl:     triamcinolone (NASACORT AQ) 55 mcg nasal inhaler, 2 Sprays daily. , Disp: , Rfl:     Visit Vitals    /78 (BP 1 Location: Left arm, BP Patient Position: Sitting)    Pulse 90    Temp 98.2 °F (36.8 °C) (Oral)    Resp 14    Ht 5' 6\" (1.676 m)    Wt 162 lb 6.4 oz (73.7 kg)    SpO2 98%    BMI 26.21 kg/m2       ROS  See above  Physical Exam   Constitutional: She appears well-developed and well-nourished. HENT:   Head: Normocephalic and atraumatic. Right Ear: External ear normal.   Left Ear: External ear normal.   Nares - congested but no evidence in left nare of recent bleeding. Sinuses nontender    Neck: Neck supple. Cardiovascular: Normal rate, regular rhythm and normal heart sounds.     Pulmonary/Chest: Effort normal and breath sounds normal.   Lymphadenopathy: She has no cervical adenopathy. Vitals reviewed. ASSESSMENT and PLAN  Epistaxis left nare - will stop Nasonex but continue rest of allergy medication. May need to see Dr. Vinh Rowland again (saw in past for sinus congestion) for possible cauterization. Continue saline ns and humidifier. Reassured, no sign of bacterial sinusitis as cause. Most likely secondary to dry air, chronic allergies and nasal steroids. No orders of the defined types were placed in this encounter.     Follow-up Disposition: Not on File

## 2018-03-28 ENCOUNTER — HOSPITAL ENCOUNTER (OUTPATIENT)
Dept: CT IMAGING | Age: 53
Discharge: HOME | End: 2018-03-28
Attending: OTOLARYNGOLOGY
Payer: COMMERCIAL

## 2018-03-28 DIAGNOSIS — J32.0 CHRONIC MAXILLARY SINUSITIS: ICD-10-CM

## 2018-03-28 PROCEDURE — 70486 CT MAXILLOFACIAL W/O DYE: CPT

## 2018-06-19 ENCOUNTER — OFFICE VISIT (OUTPATIENT)
Dept: INTERNAL MEDICINE CLINIC | Age: 53
End: 2018-06-19

## 2018-06-19 VITALS
WEIGHT: 164.4 LBS | BODY MASS INDEX: 26.42 KG/M2 | OXYGEN SATURATION: 98 % | DIASTOLIC BLOOD PRESSURE: 82 MMHG | HEIGHT: 66 IN | SYSTOLIC BLOOD PRESSURE: 132 MMHG | TEMPERATURE: 98.2 F | RESPIRATION RATE: 17 BRPM | HEART RATE: 75 BPM

## 2018-06-19 DIAGNOSIS — I10 ESSENTIAL HYPERTENSION: Primary | ICD-10-CM

## 2018-06-19 RX ORDER — MELOXICAM 15 MG/1
15 TABLET ORAL DAILY
COMMUNITY
End: 2018-12-18 | Stop reason: ALTCHOICE

## 2018-06-19 RX ORDER — LISINOPRIL 40 MG/1
TABLET ORAL
Qty: 90 TAB | Refills: 3 | Status: SHIPPED | OUTPATIENT
Start: 2018-06-19 | End: 2019-08-10 | Stop reason: SDUPTHER

## 2018-06-19 NOTE — PROGRESS NOTES
Chief Complaint   Patient presents with    Follow Up Chronic Condition     HTN     1. Have you been to the ER, urgent care clinic since your last visit? Hospitalized since your last visit? No    2. Have you seen or consulted any other health care providers outside of the [address] [place] [address] since your last visit? Include any pap smears or colon screening.  No

## 2018-06-19 NOTE — PROGRESS NOTES
Subjective:     Gwen Riavs is a 48 y.o. female who presents for follow up of hypertension. Diet and Lifestyle: generally follows a low sodium diet, exercises regularly  Home BP Monitoring: is well controlled at home, ranging 130's/80's    Cardiovascular ROS: taking medications as instructed, no medication side effects noted, no TIA's, no chest pain on exertion, no dyspnea on exertion, no swelling of ankles, no orthostatic dizziness or lightheadedness, no palpitations. New concerns:   Continued pain in left shoulder. Had injections in left neck through Dr. Keaton Ding which helped. Increased tightness in buttocks and tenderness causing pain in right leg. Starting PT. Likes water aerobics but making her nauseous. Water is cool. Current Outpatient Prescriptions   Medication Sig Dispense Refill    lisinopril (PRINIVIL, ZESTRIL) 40 mg tablet TAKE 1 TABLET BY MOUTH ONCE DAILY 90 Tab 3    meloxicam (MOBIC) 15 mg tablet Take 15 mg by mouth daily.  ALPRAZolam (XANAX) 0.5 mg tablet Take 0.5-1 Tabs by mouth two (2) times daily as needed for Anxiety. Max Daily Amount: 1 mg. 30 Tab 1    raNITIdine (ZANTAC) 300 mg tablet 1 TABLET BY MOUTH AT BEDTIME  5    loratadine (CLARITIN) 10 mg tablet Take 10 mg by mouth.  triamcinolone (NASACORT AQ) 55 mcg nasal inhaler 2 Sprays daily. Lab Results   Component Value Date/Time    Cholesterol, total 219 (H) 05/04/2017 11:23 AM    HDL Cholesterol 102 05/04/2017 11:23 AM    LDL, calculated 109 (H) 05/04/2017 11:23 AM    Triglyceride 42 05/04/2017 11:23 AM     Lab Results   Component Value Date/Time    ALT (SGPT) 14 05/04/2017 11:23 AM    AST (SGOT) 22 05/04/2017 11:23 AM    Alk.  phosphatase 75 05/04/2017 11:23 AM    Bilirubin, total 0.3 05/04/2017 11:23 AM    Albumin 4.5 05/04/2017 11:23 AM    Protein, total 7.1 05/04/2017 11:23 AM    PLATELET 638 17/12/4025 10:47 AM       Lab Results   Component Value Date/Time    GFR est non- 05/04/2017 11:23 AM GFRNA, [address] >60 03/29/2011 07:50 AM    GFR est  05/04/2017 11:23 AM    GFRAA, POC >60 03/29/2011 07:50 AM    Creatinine 0.65 05/04/2017 11:23 AM    Creatinine (POC) 0.6 03/29/2011 07:50 AM    BUN 10 05/04/2017 11:23 AM    Sodium 143 05/04/2017 11:23 AM    Potassium 4.4 05/04/2017 11:23 AM    Chloride 101 05/04/2017 11:23 AM    CO2 22 05/04/2017 11:23 AM    PTH, Intact 34 12/19/2016 09:57 AM        Review of Systems, additional:  Pertinent items are noted in HPI. Objective:     Visit Vitals    /82    Pulse 75    Temp 98.2 °F (36.8 °C) (Oral)    Resp 17    Ht 5' 6\" (1.676 m)    Wt 164 lb 6.4 oz (74.6 kg)    SpO2 98%    BMI 26.53 kg/m2     Appearance: alert, well appearing, and in no distress and oriented to person, place, and time. General exam:   NECK: supple, no lad, no bruit, no tm  LUNGS: cta bilat  CV rrr, no m/g/r  ABD: soft, nt, nd, nabs  EXT: no c/c/e. Assessment/Plan:     hypertension well controlled. lab results and schedule of future lab studies reviewed with patient. Had labs drawn through finger stick at work in the winter. Total chol was up to 240s. Will repeat. Orders Placed This Encounter    METABOLIC PANEL, COMPREHENSIVE    LIPID PANEL    lisinopril (PRINIVIL, ZESTRIL) 40 mg tablet    meloxicam (MOBIC) 15 mg tablet     Follow-up Disposition:  Return in about 6 months (around 12/19/2018) for htn.

## 2018-06-19 NOTE — MR AVS SNAPSHOT
[address] [place] [address] [address] [facility] 
[phone] Patient: Rosario Murray MRN: [id number] OVQ:[id number] Visit Information Date & Time Provider Department Dept. Phone Encounter #  
 6/19/2018  9:40 AM Vivek Max MD [facility] [phone] [id number] Follow-up Instructions Return in about 6 months (around 12/19/2018) for htn. Your Appointments 12/18/2018  9:20 AM  
ROUTINE CARE with Vivek Max MD  
[facility]: R F/U  
 [address] [facility] [id number] [place] [address] 66. [address] 7 [address] Upcoming Health Maintenance Date Due Influenza Age 5 to Adult 8/1/2018 PAP AKA CERVICAL CYTOLOGY 10/23/2018 BREAST CANCER SCRN MAMMOGRAM 11/9/2019 COLONOSCOPY 12/21/2021 DTaP/Tdap/Td series (2 - Td) 2/12/2026 Allergies as of 6/19/2018  Review Complete On: 6/19/2018 By: Vivek Max MD  
  
 Severity Noted Reaction Type Reactions Naprosyn [Naproxen] Medium 06/09/2014   Side Effect Other (comments) GI distress and loose stools Hydrochlorothiazide  12/21/2011    Other (comments) Lightheaded/dizzy Macrobid [Nitrofurantoin Monohyd/m-cryst]  12/22/2011    Nausea Only Sulfa (Sulfonamide Antibiotics)  12/21/2011    Unknown (comments) Current Immunizations  Never Reviewed Name Date Tdap 2/12/2016 Not reviewed this visit You Were Diagnosed With   
  
 Codes Comments Essential hypertension    -  Primary ICD-10-CM: I10 
ICD-9-CM: 401.9 Vitals BP Pulse Temp Resp Height(growth percentile) Weight(growth percentile) 132/82 75 98.2 °F (36.8 °C) (Oral) 17 5' 6\" (1.676 m) 164 lb 6.4 oz (74.6 kg) SpO2 BMI OB Status Smoking Status 98% 26.53 kg/m2 Menopause Never Smoker Vitals History BMI and BSA Data Body Mass Index Body Surface Area  
 26.53 kg/m 2 1.86 m 2 Preferred Pharmacy Pharmacy Name Phone [facility]/PHARMACY #[id number]- [address], [address] Connie Cardenas [phone] Your Updated Medication List  
  
   
This list is accurate as of 6/19/18 10:09 AM.  Always use your most recent med list.  
  
  
  
  
 ALPRAZolam 0.5 mg tablet Commonly known as:  Donata Carton Take 0.5-1 Tabs by mouth two (2) times daily as needed for Anxiety. Max Daily Amount: 1 mg. CLARITIN 10 mg tablet Generic drug:  loratadine Take 10 mg by mouth.  
  
 lisinopril 40 mg tablet Commonly known as:  PRINIVIL, ZESTRIL  
TAKE 1 TABLET BY MOUTH ONCE DAILY  
  
 meloxicam 15 mg tablet Commonly known as:  MOBIC Take 15 mg by mouth daily. NASACORT AQ 55 mcg nasal inhaler Generic drug:  triamcinolone 2 Sprays daily. raNITIdine 300 mg tablet Commonly known as:  ZANTAC  
1 TABLET BY MOUTH AT BEDTIME Prescriptions Sent to Pharmacy Refills  
 lisinopril (PRINIVIL, ZESTRIL) 40 mg tablet 3 Sig: TAKE 1 TABLET BY MOUTH ONCE DAILY Class: Normal  
 Pharmacy: [facility]/pharmacy [address] [place] [address] Ph #: [phone] We Performed the Following LIPID PANEL [95799 CPT(R)] METABOLIC PANEL, COMPREHENSIVE [55123 CPT(R)] Follow-up Instructions Return in about 6 months (around 12/19/2018) for htn. Introducing [facility] & HEALTH SERVICES! Willadean Saint introduces [employer] patient portal. Now you can access parts of your medical record, email your doctor's office, and request medication refills online. 1. In your internet browser, go to [email or web address] 2. Click on the First Time User? Click Here link in the Sign In box. You will see the New Member Sign Up page. 3. Enter your [employer] Access Code exactly as it appears below. You will not need to use this code after youve completed the sign-up process.  If you do not sign up before the expiration date, you must request a new code. · [employer] Access Code: [email or web address] Expires: 7/14/2018  5:34 AM 
 
4. Enter the last four digits of your Social Security Number (xxxx) and Date of Birth (mm/dd/yyyy) as indicated and click Submit. You will be taken to the next sign-up page. 5. Create a [employer] ID. This will be your [employer] login ID and cannot be changed, so think of one that is secure and easy to remember. 6. Create a [employer] password. You can change your password at any time. 7. Enter your Password Reset Question and Answer. This can be used at a later time if you forget your password. 8. Enter your e-mail address. You will receive e-mail notification when new information is available in [address] 9. Click Sign Up. You can now view and download portions of your medical record. 10. Click the Download Summary menu link to download a portable copy of your medical information. If you have questions, please visit the Frequently Asked Questions section of the [employer] website. Remember, [employer] is NOT to be used for urgent needs. For medical emergencies, dial 911. Now available from your iPhone and Android! Please provide this summary of care documentation to your next provider. Your primary care clinician is listed as NICHOLE ORR. If you have any questions after today's visit, please call [phone].

## 2018-06-20 LAB
ALBUMIN SERPL-MCNC: 4.3 G/DL (ref 3.5–5.5)
ALBUMIN/GLOB SERPL: 1.7 {RATIO} (ref 1.2–2.2)
ALP SERPL-CCNC: 82 IU/L (ref 39–117)
ALT SERPL-CCNC: 21 IU/L (ref 0–32)
AST SERPL-CCNC: 24 IU/L (ref 0–40)
BILIRUB SERPL-MCNC: 0.5 MG/DL (ref 0–1.2)
BUN SERPL-MCNC: 15 MG/DL (ref 6–24)
BUN/CREAT SERPL: 20 (ref 9–23)
CALCIUM SERPL-MCNC: 9.7 MG/DL (ref 8.7–10.2)
CHLORIDE SERPL-SCNC: 104 MMOL/L (ref 96–106)
CHOLEST SERPL-MCNC: 209 MG/DL (ref 100–199)
CO2 SERPL-SCNC: 25 MMOL/L (ref 20–29)
CREAT SERPL-MCNC: 0.76 MG/DL (ref 0.57–1)
GFR SERPLBLD CREATININE-BSD FMLA CKD-EPI: 104 ML/MIN/1.73
GFR SERPLBLD CREATININE-BSD FMLA CKD-EPI: 90 ML/MIN/1.73
GLOBULIN SER CALC-MCNC: 2.6 G/DL (ref 1.5–4.5)
GLUCOSE SERPL-MCNC: 108 MG/DL (ref 65–99)
HDLC SERPL-MCNC: 81 MG/DL
INTERPRETATION, 910389: NORMAL
LDLC SERPL CALC-MCNC: 117 MG/DL (ref 0–99)
POTASSIUM SERPL-SCNC: 4.5 MMOL/L (ref 3.5–5.2)
PROT SERPL-MCNC: 6.9 G/DL (ref 6–8.5)
SODIUM SERPL-SCNC: 143 MMOL/L (ref 134–144)
TRIGL SERPL-MCNC: 53 MG/DL (ref 0–149)
VLDLC SERPL CALC-MCNC: 11 MG/DL (ref 5–40)

## 2018-12-18 ENCOUNTER — OFFICE VISIT (OUTPATIENT)
Dept: INTERNAL MEDICINE CLINIC | Age: 53
End: 2018-12-18

## 2018-12-18 VITALS
HEIGHT: 66 IN | SYSTOLIC BLOOD PRESSURE: 135 MMHG | BODY MASS INDEX: 25.71 KG/M2 | HEART RATE: 82 BPM | WEIGHT: 160 LBS | RESPIRATION RATE: 20 BRPM | OXYGEN SATURATION: 98 % | DIASTOLIC BLOOD PRESSURE: 80 MMHG | TEMPERATURE: 98.4 F

## 2018-12-18 DIAGNOSIS — Z23 ENCOUNTER FOR IMMUNIZATION: ICD-10-CM

## 2018-12-18 DIAGNOSIS — I10 ESSENTIAL HYPERTENSION: Primary | ICD-10-CM

## 2018-12-18 RX ORDER — OMEPRAZOLE 20 MG/1
TABLET, DELAYED RELEASE ORAL
COMMUNITY
Start: 2016-10-26 | End: 2019-02-11 | Stop reason: ALTCHOICE

## 2018-12-18 NOTE — PROGRESS NOTES
Subjective:     Eboni Blanca is a 48 y.o. female who presents for follow up of hypertension. Diet and Lifestyle: generally follows a low sodium diet, exercises regularly - joined gym to continue exercising. Home BP Monitoring: is well controlled at home, ranging 120-130's/70-80's    Cardiovascular ROS: taking medications as instructed, no medication side effects noted, no TIA's, no chest pain on exertion, no dyspnea on exertion, no swelling of ankles, no orthostatic dizziness or lightheadedness, no palpitations. New concerns:   Hot flashes continue. Not interested in rx meds. Has tried black cohash. .  Had US for GB. No stones but told that her liver and kidneys looked abnormal.  Had EGD with esophageal dilation. Current Outpatient Medications   Medication Sig Dispense Refill    omeprazole (PRILOSEC OTC) 20 mg tablet omeprazole 20 mg tablet,delayed release   OTC      lisinopril (PRINIVIL, ZESTRIL) 40 mg tablet TAKE 1 TABLET BY MOUTH ONCE DAILY 90 Tab 3    ALPRAZolam (XANAX) 0.5 mg tablet Take 0.5-1 Tabs by mouth two (2) times daily as needed for Anxiety. Max Daily Amount: 1 mg. 30 Tab 1    loratadine (CLARITIN) 10 mg tablet Take 10 mg by mouth.  triamcinolone (NASACORT AQ) 55 mcg nasal inhaler 2 Sprays as needed.  raNITIdine (ZANTAC) 300 mg tablet 1 TABLET BY MOUTH AT BEDTIME  5        Lab Results   Component Value Date/Time    Hemoglobin A1c 6.2 (H) 07/02/2012 07:31 AM    Glucose 108 (H) 06/19/2018 10:29 AM    LDL, calculated 117 (H) 06/19/2018 10:29 AM    Creatinine (POC) 0.6 03/29/2011 07:50 AM    Creatinine 0.76 06/19/2018 10:29 AM      Lab Results   Component Value Date/Time    Cholesterol, total 209 (H) 06/19/2018 10:29 AM    HDL Cholesterol 81 06/19/2018 10:29 AM    LDL, calculated 117 (H) 06/19/2018 10:29 AM    Triglyceride 53 06/19/2018 10:29 AM     Lab Results   Component Value Date/Time    ALT (SGPT) 21 06/19/2018 10:29 AM    AST (SGOT) 24 06/19/2018 10:29 AM    Alk. phosphatase 82 06/19/2018 10:29 AM    Bilirubin, total 0.5 06/19/2018 10:29 AM    Albumin 4.3 06/19/2018 10:29 AM    Protein, total 6.9 06/19/2018 10:29 AM    PLATELET 616 34/63/3807 10:47 AM     Lab Results   Component Value Date/Time    GFR est non-AA 90 06/19/2018 10:29 AM    GFRNA, POC >60 03/29/2011 07:50 AM    GFR est  06/19/2018 10:29 AM    GFRAA, POC >60 03/29/2011 07:50 AM    Creatinine 0.76 06/19/2018 10:29 AM    Creatinine (POC) 0.6 03/29/2011 07:50 AM    BUN 15 06/19/2018 10:29 AM    Sodium 143 06/19/2018 10:29 AM    Potassium 4.5 06/19/2018 10:29 AM    Chloride 104 06/19/2018 10:29 AM    CO2 25 06/19/2018 10:29 AM    PTH, Intact 34 12/19/2016 09:57 AM        Review of Systems, additional:  Pertinent items are noted in HPI. Objective:     Visit Vitals  /80   Pulse 82   Temp 98.4 °F (36.9 °C) (Oral)   Resp 20   Ht 5' 6\" (1.676 m)   Wt 160 lb (72.6 kg)   SpO2 98%   BMI 25.82 kg/m²     Appearance: alert, well appearing, and in no distress and oriented to person, place, and time. General exam:   NECK: supple, no lad, no bruit, no tm  LUNGS: cta bilat  CV rrr, no m/g/r  ABD: soft, nt, nd, nabs  EXT: no c/c/e. Assessment/Plan:     hypertension well controlled. current treatment plan is effective, no change in therapy. Postmenopausal hot flashes - discussed black cohash and fans, dressing in layers. Does not want rx meds at this time. We will obtain the records from Dr. Noni purdy liver and kidneys. Blood levels were normal in [place] - these were reviewed with the pt. Flu shot admin today. Orders Placed This Encounter    INFLUENZA VIRUS VACCINE QUADRIVALENT, PRESERVATIVE FREE SYRINGE (60593)    omeprazole (PRILOSEC OTC) 20 mg tablet     Follow-up Disposition:  Return in about 6 months (around 6/18/2019) for htn.

## 2019-02-11 ENCOUNTER — OFFICE VISIT (OUTPATIENT)
Dept: INTERNAL MEDICINE CLINIC | Age: 54
End: 2019-02-11

## 2019-02-11 VITALS
BODY MASS INDEX: 26.03 KG/M2 | RESPIRATION RATE: 20 BRPM | TEMPERATURE: 98.2 F | HEIGHT: 66 IN | DIASTOLIC BLOOD PRESSURE: 88 MMHG | WEIGHT: 162 LBS | OXYGEN SATURATION: 96 % | HEART RATE: 75 BPM | SYSTOLIC BLOOD PRESSURE: 142 MMHG

## 2019-02-11 DIAGNOSIS — J01.00 ACUTE MAXILLARY SINUSITIS, RECURRENCE NOT SPECIFIED: Primary | ICD-10-CM

## 2019-02-11 RX ORDER — OMEPRAZOLE 20 MG/1
CAPSULE, DELAYED RELEASE ORAL
Refills: 5 | COMMUNITY
Start: 2018-12-17 | End: 2019-05-31 | Stop reason: ALTCHOICE

## 2019-02-11 RX ORDER — CEFDINIR 300 MG/1
300 CAPSULE ORAL 2 TIMES DAILY
Qty: 20 CAP | Refills: 0 | Status: SHIPPED | OUTPATIENT
Start: 2019-02-11 | End: 2019-02-25 | Stop reason: ALTCHOICE

## 2019-02-11 NOTE — PROGRESS NOTES
Symptoms: headache on left side and ear, head congestion, nasal/yellow, pnd, cough/non productive. Saline spray, nasonex, nothing is helping.

## 2019-02-11 NOTE — PROGRESS NOTES
Subjective:   Tony Pina is a 48 y.o. female who complains of congestion, sore throat, post nasal drip, left sinus pain and left ear pain for 9 days, unchanged since that time. She denies a history of chills, fevers, shortness of breath and wheezing. She reports all of symptoms seem to be on the left side of her face. Evaluation to date: none. Treatment to date: OTC products. Patient does not smoke cigarettes. Relevant PMH: No pertinent additional PMH. Patient Active Problem List    Diagnosis Date Noted    Multinodular goiter 12/19/2016    Nephrolithiasis 04/15/2015    HTN (hypertension) 12/22/2011    Allergic rhinitis 12/22/2011     Allergies   Allergen Reactions    Naprosyn [Naproxen] Other (comments)     GI distress and loose stools    Hydrochlorothiazide Other (comments)     Lightheaded/dizzy    Macrobid [Nitrofurantoin Monohyd/M-Cryst] Nausea Only    Sulfa (Sulfonamide Antibiotics) Unknown (comments)        Review of Systems  Pertinent items are noted in HPI. Objective:     Visit Vitals  /88   Pulse 75   Temp 98.2 °F (36.8 °C) (Oral)   Resp 20   Ht 5' 6\" (1.676 m)   Wt 162 lb (73.5 kg)   SpO2 96%   BMI 26.15 kg/m²     General:  alert, cooperative, no distress   Eyes: negative   Ears: normal TM's and external ear canals AU   Sinuses: tenderness over left maxillary   Mouth:  Lips, mucosa, and tongue normal. Teeth and gums normal   Neck: no adenopathy. Heart: normal rate and regular rhythm, no murmurs noted. Lungs: clear to auscultation bilaterally   Abdomen: Not examined        Assessment/Plan:   sinusitis  Nasal saline sprays for congestion. Antibiotics per orders. RTC prn. Diagnoses and all orders for this visit:    1. Acute maxillary sinusitis, recurrence not specified  -     cefdinir (OMNICEF) 300 mg capsule; Take 1 Cap by mouth two (2) times a day. .advise the patient to continue her nasal saline and nasonex. Take antibiotic as prescribed. Follow up if not better.  All this discussed with the patient and she understands and agrees.

## 2019-02-25 ENCOUNTER — OFFICE VISIT (OUTPATIENT)
Dept: INTERNAL MEDICINE CLINIC | Age: 54
End: 2019-02-25

## 2019-02-25 VITALS
TEMPERATURE: 98.6 F | SYSTOLIC BLOOD PRESSURE: 154 MMHG | BODY MASS INDEX: 26.23 KG/M2 | HEIGHT: 66 IN | WEIGHT: 163.2 LBS | HEART RATE: 61 BPM | DIASTOLIC BLOOD PRESSURE: 89 MMHG | OXYGEN SATURATION: 99 %

## 2019-02-25 DIAGNOSIS — J01.00 ACUTE NON-RECURRENT MAXILLARY SINUSITIS: Primary | ICD-10-CM

## 2019-02-25 RX ORDER — METHYLPREDNISOLONE 4 MG/1
TABLET ORAL
Qty: 1 DOSE PACK | Refills: 0 | Status: SHIPPED | OUTPATIENT
Start: 2019-02-25 | End: 2019-05-31 | Stop reason: ALTCHOICE

## 2019-02-25 RX ORDER — AMOXICILLIN AND CLAVULANATE POTASSIUM 875; 125 MG/1; MG/1
1 TABLET, FILM COATED ORAL EVERY 12 HOURS
Qty: 20 TAB | Refills: 0 | Status: SHIPPED | OUTPATIENT
Start: 2019-02-25 | End: 2019-05-31 | Stop reason: ALTCHOICE

## 2019-03-02 LAB — CREATININE, EXTERNAL: 0.66

## 2019-03-11 ENCOUNTER — TELEPHONE (OUTPATIENT)
Dept: INTERNAL MEDICINE CLINIC | Age: 54
End: 2019-03-11

## 2019-03-11 RX ORDER — AZITHROMYCIN 250 MG/1
250 TABLET, FILM COATED ORAL SEE ADMIN INSTRUCTIONS
Qty: 6 TAB | Refills: 0 | Status: SHIPPED | OUTPATIENT
Start: 2019-03-11 | End: 2019-03-16

## 2019-03-11 NOTE — TELEPHONE ENCOUNTER
Ms. Bertha Mandujano stated that she is still having the sinus issues and have completed her steroids an antibiotics.

## 2019-03-11 NOTE — TELEPHONE ENCOUNTER
Patient states she still has facial pressure, and left sided \"puffiness\". Patient states she is taking Mucinex, using saline nasal spray, completed the medrol dose candace, but did not complete the Augmentin because it gave her upset stomach even with taking it on a full stomach.

## 2019-05-31 ENCOUNTER — OFFICE VISIT (OUTPATIENT)
Dept: INTERNAL MEDICINE CLINIC | Age: 54
End: 2019-05-31

## 2019-05-31 VITALS
HEART RATE: 79 BPM | HEIGHT: 66 IN | OXYGEN SATURATION: 99 % | TEMPERATURE: 98.9 F | BODY MASS INDEX: 25.71 KG/M2 | DIASTOLIC BLOOD PRESSURE: 82 MMHG | WEIGHT: 160 LBS | SYSTOLIC BLOOD PRESSURE: 130 MMHG

## 2019-05-31 DIAGNOSIS — Z00.00 ROUTINE GENERAL MEDICAL EXAMINATION AT A HEALTH CARE FACILITY: Primary | ICD-10-CM

## 2019-05-31 RX ORDER — SUMATRIPTAN 100 MG/1
100 TABLET, FILM COATED ORAL
Qty: 9 TAB | Refills: 6 | Status: SHIPPED | OUTPATIENT
Start: 2019-05-31 | End: 2019-05-31

## 2019-05-31 NOTE — PROGRESS NOTES
Chief Complaint   Patient presents with    Physical     Visit Vitals  /82   Pulse 79   Temp 98.9 °F (37.2 °C) (Oral)   Ht 5' 6\" (1.676 m)   Wt 160 lb (72.6 kg)   SpO2 99%   BMI 25.82 kg/m²     1. Have you been to the ER, urgent care clinic since your last visit? Hospitalized since your last visit? no    2. Have you seen or consulted any other health care providers outside of the [address] [place] [address] since your last visit? Include any pap smears or colon screening.  no

## 2019-05-31 NOTE — PROGRESS NOTES
Subjective:   47 y.o. female for Well Woman Check. Her gyne and breast care is done elsewhere by her Ob-Gyne physician. Patient Active Problem List    Diagnosis Date Noted    Multinodular goiter 12/19/2016    Nephrolithiasis 04/15/2015    HTN (hypertension) 12/22/2011    Allergic rhinitis 12/22/2011     Current Outpatient Medications   Medication Sig Dispense Refill    SUMAtriptan (IMITREX) 100 mg tablet Take 1 Tab by mouth once as needed for Migraine for up to 1 dose. Can repeat x 1 dose in 2 hours. Max 2 per 24 hours. 9 Tab 6    lisinopril (PRINIVIL, ZESTRIL) 40 mg tablet TAKE 1 TABLET BY MOUTH ONCE DAILY 90 Tab 3    raNITIdine (ZANTAC) 300 mg tablet 1 TABLET BY MOUTH AT BEDTIME PRN  5    loratadine (CLARITIN) 10 mg tablet Take 10 mg by mouth daily as needed.  triamcinolone (NASACORT AQ) 55 mcg nasal inhaler 2 Sprays as needed.  ALPRAZolam (XANAX) 0.5 mg tablet Take 0.5-1 Tabs by mouth two (2) times daily as needed for Anxiety.  Max Daily Amount: 1 mg. 30 Tab 1     Allergies   Allergen Reactions    Naprosyn [Naproxen] Other (comments)     GI distress and loose stools    Hydrochlorothiazide Other (comments)     Lightheaded/dizzy    Macrobid [Nitrofurantoin Monohyd/M-Cryst] Nausea Only    Sulfa (Sulfonamide Antibiotics) Unknown (comments)     Past Medical History:   Diagnosis Date    Anxiety     Calculus of kidney 2014    seen on ultrasound - no sxs ever    Environmental allergies     GERD (gastroesophageal reflux disease)     hiatal hernia    Hypertension      Past Surgical History:   Procedure Laterality Date    HX COLONOSCOPY  12/2011    Eran, f/u 10 years    NEUROLOGICAL PROCEDURE UNLISTED  2001    lumbar hodge., redo w/L5-S1 fusion (11/02-Ken)     Family History   Problem Relation Age of Onset    Cancer Father         prostate    Hypertension Father     Diabetes Father      Social History     Tobacco Use    Smoking status: Never Smoker    Smokeless tobacco: Never Used Substance Use Topics    Alcohol use: No     Alcohol/week: 0.0 oz         Specific concerns today: continued left sinus congestion and swelling of left cheek. Last treated with abx and steroids in Feb.  We had called in another round of abx in March but pt did not start because she was on abx through her dentist, clindamycin. Having pain and swelling in upper left gums - dentist cannot find a cause. Currently using Saline ns, Claritiin and Nasocort. Also using Netti Pot. Nasal discharge is clear. Pain is intermittnet. Pain in eye, ear - everything feels clogged at times. Occurring at least once per week for the last 1.5 years. She saw John Dinero last year for same issues. Had CT of sinuses which showed mucosal thickening of right ethmoid sinuses. Exercise - walking every day, yoga. Had onsite biometrics. BS slighlty elevated, total cholesterol 263. No use of Xanax. Continues with hot flashes. Never tried Tristin Altamirano earlier this year for f/u of thyroid nodules and parathyroid nodule. No changes found.   Calcium and PTH levels were normal.       Review of Systems  Constitutional: negative except for over the last 3 weeks feels drained, fatigued  Eyes: negative except for left eye issues as above  Ears, nose, mouth, throat, and face: negative except for left sinsu congestion and ear congestion pain as above  Respiratory: negative  Cardiovascular: negative  Gastrointestinal: negative except for reflux symptoms and controlled with Zantac prn   Genitourinary:negative  Integument/breast: negative  Hematologic/lymphatic: negative  Musculoskeletal:negative except for bliat knee pain improved with exercise  Neurological: negative except for left sided headaches as above  Behavioral/Psych: negative  Endocrine: negative  Allergic/Immunologic: negative    Objective:   Blood pressure 130/82, pulse 79, temperature 98.9 °F (37.2 °C), temperature source Oral, height 5' 6\" (1.676 m), weight 160 lb (72.6 kg), SpO2 99 %. Physical Examination:   General appearance - alert, well appearing, and in no distress and oriented to person, place, and time  Mental status - alert, oriented to person, place, and time, normal mood, behavior, speech, dress, motor activity, and thought processes  Eyes - pupils equal and reactive, extraocular eye movements intact  Ears - bilateral TM's and external ear canals normal  Nose - normal and patent, no erythema, discharge or polyps. Mild tenderness left maxillary sinus  Mouth - mucous membranes moist, pharynx normal without lesions  Neck - supple, no significant adenopathy, carotids upstroke normal bilaterally, no bruits, thyroid exam: thyroid is normal in size without nodules or tenderness  Lymphatics - no palpable lymphadenopathy, no hepatosplenomegaly  Chest - clear to auscultation, no wheezes, rales or rhonchi, symmetric air entry  Heart - normal rate, regular rhythm, normal S1, S2, no murmurs, rubs, clicks or gallops  Abdomen - soft, nontender, nondistended, no masses or organomegaly  Breasts - breasts appear normal, no suspicious masses, no skin or nipple changes or axillary nodes  Back exam - full range of motion, no tenderness, palpable spasm or pain on motion  Neurological - alert, oriented, normal speech, no focal findings or movement disorder noted  Musculoskeletal - no joint tenderness, deformity or swelling  Extremities - peripheral pulses normal, no pedal edema, no clubbing or cyanosis  Skin - normal coloration and turgor, no rashes, no suspicious skin lesions noted     Assessment/Plan:   48 yo female  htn - well controlled, cont same  Left intermittent sinus pressure - full w/u by ENT, dentist and ophtho have been negative. ? Migraine headaches. Trial Imitrex  Thyroid nodules and parathyroid nodule - stable with nml labs per Dr. Marjan Son, note and labs reviewed with patient.  - check labs.     call if any problems  Orders Placed This Encounter    LIPID PANEL    METABOLIC PANEL, COMPREHENSIVE    VITAMIN D, 25 HYDROXY    TSH 3RD GENERATION    CBC W/O DIFF    SUMAtriptan (IMITREX) 100 mg tablet

## 2019-06-01 LAB
25(OH)D3+25(OH)D2 SERPL-MCNC: 28.4 NG/ML (ref 30–100)
ALBUMIN SERPL-MCNC: 4.6 G/DL (ref 3.5–5.5)
ALBUMIN/GLOB SERPL: 1.8 {RATIO} (ref 1.2–2.2)
ALP SERPL-CCNC: 89 IU/L (ref 39–117)
ALT SERPL-CCNC: 19 IU/L (ref 0–32)
AST SERPL-CCNC: 23 IU/L (ref 0–40)
BILIRUB SERPL-MCNC: 0.4 MG/DL (ref 0–1.2)
BUN SERPL-MCNC: 16 MG/DL (ref 6–24)
BUN/CREAT SERPL: 20 (ref 9–23)
CALCIUM SERPL-MCNC: 9.5 MG/DL (ref 8.7–10.2)
CHLORIDE SERPL-SCNC: 102 MMOL/L (ref 96–106)
CHOLEST SERPL-MCNC: 226 MG/DL (ref 100–199)
CO2 SERPL-SCNC: 26 MMOL/L (ref 20–29)
CREAT SERPL-MCNC: 0.79 MG/DL (ref 0.57–1)
ERYTHROCYTE [DISTWIDTH] IN BLOOD BY AUTOMATED COUNT: 14.9 % (ref 12.3–15.4)
GLOBULIN SER CALC-MCNC: 2.6 G/DL (ref 1.5–4.5)
GLUCOSE SERPL-MCNC: 96 MG/DL (ref 65–99)
HCT VFR BLD AUTO: 40.3 % (ref 34–46.6)
HDLC SERPL-MCNC: 99 MG/DL
HGB BLD-MCNC: 12.7 G/DL (ref 11.1–15.9)
INTERPRETATION, 910389: NORMAL
LDLC SERPL CALC-MCNC: 115 MG/DL (ref 0–99)
MCH RBC QN AUTO: 28.5 PG (ref 26.6–33)
MCHC RBC AUTO-ENTMCNC: 31.5 G/DL (ref 31.5–35.7)
MCV RBC AUTO: 91 FL (ref 79–97)
PLATELET # BLD AUTO: 315 X10E3/UL (ref 150–450)
POTASSIUM SERPL-SCNC: 4.5 MMOL/L (ref 3.5–5.2)
PROT SERPL-MCNC: 7.2 G/DL (ref 6–8.5)
RBC # BLD AUTO: 4.45 X10E6/UL (ref 3.77–5.28)
SODIUM SERPL-SCNC: 142 MMOL/L (ref 134–144)
TRIGL SERPL-MCNC: 61 MG/DL (ref 0–149)
TSH SERPL DL<=0.005 MIU/L-ACNC: 1.18 UIU/ML (ref 0.45–4.5)
VLDLC SERPL CALC-MCNC: 12 MG/DL (ref 5–40)
WBC # BLD AUTO: 4.6 X10E3/UL (ref 3.4–10.8)

## 2019-08-12 RX ORDER — LISINOPRIL 40 MG/1
TABLET ORAL
Qty: 90 TAB | Refills: 3 | Status: SHIPPED | OUTPATIENT
Start: 2019-08-12 | End: 2020-08-10

## 2019-11-11 ENCOUNTER — HOSPITAL ENCOUNTER (OUTPATIENT)
Dept: LAB | Age: 54
Discharge: HOME | End: 2019-11-11

## 2019-11-11 ENCOUNTER — OFFICE VISIT (OUTPATIENT)
Dept: INTERNAL MEDICINE CLINIC | Age: 54
End: 2019-11-11

## 2019-11-11 VITALS
OXYGEN SATURATION: 100 % | HEART RATE: 75 BPM | DIASTOLIC BLOOD PRESSURE: 90 MMHG | BODY MASS INDEX: 25.07 KG/M2 | TEMPERATURE: 98.6 F | HEIGHT: 66 IN | WEIGHT: 156 LBS | SYSTOLIC BLOOD PRESSURE: 145 MMHG

## 2019-11-11 DIAGNOSIS — J30.2 SEASONAL ALLERGIC RHINITIS, UNSPECIFIED TRIGGER: Primary | ICD-10-CM

## 2019-11-11 DIAGNOSIS — E55.9 VITAMIN D DEFICIENCY: ICD-10-CM

## 2019-11-11 DIAGNOSIS — Z12.11 SCREEN FOR COLON CANCER: ICD-10-CM

## 2019-11-11 LAB — 25(OH)D3 SERPL-MCNC: 46.6 NG/ML (ref 30–100)

## 2019-11-11 NOTE — PATIENT INSTRUCTIONS
Stop Nasacort and Claritin. Start Zyrtec (cetirizine) 10mg at night  Start Flonase nasal spray 2 squirts each nostril daily. (Fluticasone). Continue Mucinex (plain)  Add Saline nasal spray 2 squirts each nostril 2-3 times a day.

## 2019-11-11 NOTE — PROGRESS NOTES
Subjective:   Carrie Dean is a 47 y.o. female who complains of congestion, nasal blockage, post nasal drip, dry cough and bilateral sinus pain for 2 months, unchanged since that time. heaviness in head. Feels mucous in throat constantly. She denies a history of chills, fevers, shortness of breath and wheezing. Evaluation to date: none. Treatment to date: mucinex. clariitn, drinking hot beverages. Patient does not smoke cigarettes. Relevant PMH: No pertinent additional PMH. Seen at GI for chronic diarrhea.  + c diff. Took Vancomycin and diarrhea resolved. Has colonoscopy scheduled 11/24, repeating early secondary to diarrhea. Current Outpatient Medications   Medication Sig Dispense Refill    lisinopril (PRINIVIL, ZESTRIL) 40 mg tablet TAKE 1 TABLET BY MOUTH EVERY DAY 90 Tab 3    ALPRAZolam (XANAX) 0.5 mg tablet Take 0.5-1 Tabs by mouth two (2) times daily as needed for Anxiety. Max Daily Amount: 1 mg. 30 Tab 1    loratadine (CLARITIN) 10 mg tablet Take 10 mg by mouth daily as needed.  triamcinolone (NASACORT AQ) 55 mcg nasal inhaler 2 Sprays as needed. Review of Systems  Pertinent items are noted in HPI. Objective:     Visit Vitals  /90   Pulse 75   Temp 98.6 °F (37 °C) (Oral)   Ht 5' 6\" (1.676 m)   Wt 156 lb (70.8 kg)   SpO2 100%   BMI 25.18 kg/m²     General:  alert, cooperative, no distress   Eyes: conjunctivae/corneas clear. Ears: normal TM's and external ear canals AU   Sinuses: Normal paranasal sinuses without tenderness   Mouth:  abnormal findings: mild oropharyngeal erythema and post nasal drainage   Neck: supple, symmetrical, trachea midline and no adenopathy.    Lungs: clear to auscultation bilaterally   Nares   Edematous with clear discharge        Assessment/Plan:   viral upper respiratory illness and allergic rhinitis  Stop Claritin and Nasacort and change to Zyrtec and Flonase  Add saline ns  Continue mucinex    No sign of secondary infection - discussed signs - green mucous, sinus tenderness and fevers. To contact me if any of these develop. Colonoscopy 11/20. Needs referral.    Orders Placed This Encounter    VITAMIN D, 25 HYDROXY    [facility]     Follow-up and Dispositions    · Return if symptoms worsen or fail to improve. Donn Zimmerman

## 2019-11-11 NOTE — PROGRESS NOTES
Chief Complaint   Patient presents with    Cough     sinus congestion     . Visit Vitals  /90   Pulse 75   Temp 98.6 °F (37 °C) (Oral)   Ht 5' 6\" (1.676 m)   Wt 156 lb (70.8 kg)   SpO2 100%   BMI 25.18 kg/m²     1. Have you been to the ER, urgent care clinic since your last visit? Hospitalized since your last visit? NO    2. Have you seen or consulted any other health care providers outside of the [address] [place] [address] since your last visit? Include any pap smears or colon screening.  YES

## 2019-12-04 ENCOUNTER — TELEPHONE (OUTPATIENT)
Dept: INTERNAL MEDICINE CLINIC | Age: 54
End: 2019-12-04

## 2019-12-04 NOTE — TELEPHONE ENCOUNTER
Reason for call: Pt requesting lab results from 11/11/19 visit       Callback required yes/no and why: yes     Best contact number(s): [phone]

## 2020-02-20 ENCOUNTER — TELEPHONE (OUTPATIENT)
Dept: INTERNAL MEDICINE CLINIC | Age: 55
End: 2020-02-20

## 2020-02-20 NOTE — TELEPHONE ENCOUNTER
She is fine to take amoxicillin.   Has some stomach upset with Augmentin but has tolerated amox in the past.

## 2020-02-20 NOTE — TELEPHONE ENCOUNTER
Patient will have wisdom tooth pulled on 3/2/20.  Wants to confirm if she will be able to take amoxicillin 500 mg  after the procedure. Advised that last time she took antibiotics she felt sick. Best contact number is [phone].       jace

## 2020-02-24 NOTE — PROGRESS NOTES
HISTORY OF PRESENT ILLNESS  Maria C Ochoa is a 47 y.o. female. Seen 2/11 for left sided sinusitis and placed on Omnicef by MIKE Arrington. She did not improve. Was also taking Tylenol, Nasonex, saline nasal spray and Claritin. Still not feeling better, called 2/22 with left eye irritation and drainage and placed on cipro eye GTT. Comes in today because she is still not feeling well. Currently with left sinus pressure and swelling, left ear pain, left sided headache. No fevers or chills. No rhinorrhea and cough. Previous took mucines but stopped when started the abx. Current Outpatient Medications:     omeprazole (PRILOSEC) 20 mg capsule, TAKE 1 CAPSULE BY MOUTH EVERY DAY BEFORE BREAKFAST PRN, Disp: , Rfl: 5    lisinopril (PRINIVIL, ZESTRIL) 40 mg tablet, TAKE 1 TABLET BY MOUTH ONCE DAILY, Disp: 90 Tab, Rfl: 3    ALPRAZolam (XANAX) 0.5 mg tablet, Take 0.5-1 Tabs by mouth two (2) times daily as needed for Anxiety. Max Daily Amount: 1 mg., Disp: 30 Tab, Rfl: 1    raNITIdine (ZANTAC) 300 mg tablet, 1 TABLET BY MOUTH AT BEDTIME PRN, Disp: , Rfl: 5    loratadine (CLARITIN) 10 mg tablet, Take 10 mg by mouth daily as needed. , Disp: , Rfl:     triamcinolone (NASACORT AQ) 55 mcg nasal inhaler, 2 Sprays as needed. , Disp: , Rfl:     ciprofloxacin HCl (CILOXAN) 0.3 % ophthalmic solution, 1-2 drops every 2 hours while awake for 2 days, then every 4 hours for 5 hours, Disp: 5 mL, Rfl: 0    cefdinir (OMNICEF) 300 mg capsule, Take 1 Cap by mouth two (2) times a day., Disp: 20 Cap, Rfl: 0    /89   Pulse 61   Temp 98.6 °F (37 °C) (Oral)   Ht 5' 6\" (1.676 m)   Wt 163 lb 3.2 oz (74 kg)   SpO2 99%   BMI 26.34 kg/m²           ROS  See above  Physical Exam   Constitutional: She appears well-developed and well-nourished. HENT:   Head: Normocephalic and atraumatic.    Right Ear: External ear normal.   Nares - congested with thick discharge  OP - mild erythema  SINuses - tenderness and swelling over left States \"saw Dr. Mcclain for my symptoms on 2/20/2020.  Myn symptoms are still the same and the Amoxicillin is upsetting my stomach and giving me loose stools.\"    C/O little bit of productive cough with yellow with brown chunks in phlegm, runny nose, stuffy nose, yellow post nasal drainage, and sore throat.    Denies fever but states \"I have had night sweats around 4:00AM last night though so maybe it had a fever and it broke\".    Denies chest pain and SOB stating \"get a little short of breath when coughing but that's it\".    Doesn't want to continue Amoxicillin but not sure what else to do as \"just don't feel right yet\".    Patient instructed will forward to Dr. Vazquez and his office will call back.  Patient agrees with plan and states understanding.      Pharmacy is [facility].    Patient can be reached at [phone].    Forward to Dr. Vazquez.  Please review and advise.       maxillary sinus  EARS - left TM fullness without erythema   Eyes:   Swelling under left eye   Neck: Neck supple. Pulmonary/Chest: Breath sounds normal.   Lymphadenopathy:     She has no cervical adenopathy. Vitals reviewed. ASSESSMENT and PLAN  Left maxillary sinusitis - did not respond to 1st course of abx. Restart Mucinex. Augmentin and Medrol dose pack. Increase use of Saline ns.     Orders Placed This Encounter    methylPREDNISolone (MEDROL DOSEPACK) 4 mg tablet    amoxicillin-clavulanate (AUGMENTIN) 875-125 mg per tablet     Follow-up Disposition: Not on File

## 2020-02-27 ENCOUNTER — OFFICE VISIT (OUTPATIENT)
Dept: INTERNAL MEDICINE CLINIC | Age: 55
End: 2020-02-27

## 2020-02-27 VITALS
SYSTOLIC BLOOD PRESSURE: 142 MMHG | BODY MASS INDEX: 25.88 KG/M2 | TEMPERATURE: 99.4 F | DIASTOLIC BLOOD PRESSURE: 82 MMHG | HEART RATE: 86 BPM | HEIGHT: 66 IN | WEIGHT: 161 LBS | OXYGEN SATURATION: 99 %

## 2020-02-27 DIAGNOSIS — J06.9 VIRAL UPPER RESPIRATORY TRACT INFECTION: Primary | ICD-10-CM

## 2020-02-27 PROBLEM — M54.50 LOW BACK PAIN: Status: ACTIVE | Noted: 2018-05-29

## 2020-02-27 PROBLEM — M54.12 CERVICAL RADICULITIS: Status: ACTIVE | Noted: 2017-07-07

## 2020-02-27 PROBLEM — M54.2 CERVICALGIA: Status: ACTIVE | Noted: 2018-01-12

## 2020-02-27 PROBLEM — M54.31 RIGHT SIDED SCIATICA: Status: ACTIVE | Noted: 2018-05-29

## 2020-02-27 PROBLEM — M50.30 DEGENERATIVE DISC DISEASE, CERVICAL: Status: ACTIVE | Noted: 2018-01-12

## 2020-02-27 PROBLEM — Z98.1 STATUS POST LUMBAR SPINAL FUSION: Status: ACTIVE | Noted: 2018-05-29

## 2020-02-27 PROBLEM — N95.9 PERIMENOPAUSAL DISORDER: Status: ACTIVE | Noted: 2017-10-30

## 2020-02-27 RX ORDER — VITAMIN E 268 MG
CAPSULE ORAL DAILY
COMMUNITY
End: 2020-07-17 | Stop reason: ALTCHOICE

## 2020-02-27 NOTE — PROGRESS NOTES
Subjective:   Rodney Monterroso is a 54 y.o. female who complains of cough described as productive of yellow sputum, headache, bilateral sinus pain, chills, bilateral ear no pain and yellow nasal discharge for 3 days, gradually worsening since that time. She denies a history of fevers, shortness of breath and wheezing. Tmax at home waas 99.2, 99.4 herre. Evaluation to date: none. Treatment to date: mucinex, Flonase, Tylenol  Patient does not smoke cigarettes. Relevant PMH: No pertinent additional PMH. Current Outpatient Medications   Medication Sig Dispense Refill    cholecalciferol, vitamin D3, (VITAMIN D3 PO) Take  by mouth.  vitamin E (AQUA GEMS) 400 unit capsule Take  by mouth daily.  lisinopril (PRINIVIL, ZESTRIL) 40 mg tablet TAKE 1 TABLET BY MOUTH EVERY DAY 90 Tab 3    loratadine (CLARITIN) 10 mg tablet Take 10 mg by mouth daily as needed.  ALPRAZolam (XANAX) 0.5 mg tablet Take 0.5-1 Tabs by mouth two (2) times daily as needed for Anxiety. Max Daily Amount: 1 mg. 30 Tab 1    triamcinolone (NASACORT AQ) 55 mcg nasal inhaler 2 Sprays as needed. Review of Systems  Pertinent items are noted in HPI. Objective:     Visit Vitals  /82   Pulse 86   Temp 99.4 °F (37.4 °C) (Oral)   Ht 5' 6\" (1.676 m)   Wt 161 lb (73 kg)   SpO2 99%   BMI 25.99 kg/m²     General:  alert, cooperative, no distress   Eyes: conjunctivae/corneas clear. Ears: Fullness bilat TM without erythema, ext canals wnl   Sinuses: tenderness over bilateral maxillary - mild   Mouth:  abnormal findings: mild oropharyngeal erythema and post nasal drainage   Neck: supple, symmetrical, trachea midline and no adenopathy. Lungs: clear to auscultation bilaterally   Nares   Edematous with clear discharge. Assessment/Plan:   viral upper respiratory illness  Suggested symptomatic OTC remedies. RTC prn. Discussed diagnosis and treatment of viral URIs.   Discussed the importance of avoiding unnecessary antibiotic therapy. mucinex 1200mg bid  Saline ns  Delsym prn cough. Orders Placed This Encounter    cholecalciferol, vitamin D3, (VITAMIN D3 PO)    vitamin E (AQUA GEMS) 400 unit capsule       .

## 2020-02-27 NOTE — PATIENT INSTRUCTIONS
Continue Mucinex 1200mg twice a day Continue Saline nasal spray Lots of fluids If needed for Cough: Delsym cough syrup.

## 2020-03-16 ENCOUNTER — TELEPHONE (OUTPATIENT)
Dept: INTERNAL MEDICINE CLINIC | Age: 55
End: 2020-03-16

## 2020-03-16 RX ORDER — AZITHROMYCIN 250 MG/1
250 TABLET, FILM COATED ORAL SEE ADMIN INSTRUCTIONS
Qty: 6 TAB | Refills: 0 | Status: SHIPPED | OUTPATIENT
Start: 2020-03-16 | End: 2020-03-21

## 2020-03-16 NOTE — TELEPHONE ENCOUNTER
Patient states she is somewhat better from when she came in on 2/27/2020 but patient c/o dry bronchial cough, a lot of mucous (mix of clear and yellow sputum). Patient denies fever, denies traveling out of the country, and denies being around anyone that was known to be sick. Patient states she has been using saline nasal spray, Nasacort, Delsym, and Mucinex. Patient states she has been using the Mucinex since her appointment and it broke of some of the phlegm but she doesn't want to keep taking it.

## 2020-03-16 NOTE — TELEPHONE ENCOUNTER
I have sent a zpack (abx) to her pharmacy. She needs to stay on the Mucinex for now and the rest of her current meds. zpack lasts 10 days, call at that time if not improved.

## 2020-07-17 ENCOUNTER — OFFICE VISIT (OUTPATIENT)
Dept: INTERNAL MEDICINE CLINIC | Age: 55
End: 2020-07-17

## 2020-07-17 VITALS
HEART RATE: 72 BPM | BODY MASS INDEX: 25.02 KG/M2 | OXYGEN SATURATION: 99 % | WEIGHT: 155 LBS | RESPIRATION RATE: 16 BRPM | DIASTOLIC BLOOD PRESSURE: 94 MMHG | TEMPERATURE: 95.2 F | SYSTOLIC BLOOD PRESSURE: 149 MMHG

## 2020-07-17 DIAGNOSIS — Z00.00 ROUTINE GENERAL MEDICAL EXAMINATION AT A HEALTH CARE FACILITY: Primary | ICD-10-CM

## 2020-07-17 RX ORDER — ERYTHROMYCIN 5 MG/G
OINTMENT OPHTHALMIC
COMMUNITY
Start: 2020-06-25 | End: 2020-07-17 | Stop reason: ALTCHOICE

## 2020-07-17 RX ORDER — CARBOXYMETHYLCELLULOSE SODIUM 10 MG/ML
1 GEL OPHTHALMIC AS NEEDED
COMMUNITY
End: 2021-12-27

## 2020-07-17 RX ORDER — CHOLECALCIFEROL (VITAMIN D3) 50 MCG
CAPSULE ORAL
COMMUNITY

## 2020-07-17 RX ORDER — BISMUTH SUBSALICYLATE 262 MG
1 TABLET,CHEWABLE ORAL DAILY
COMMUNITY

## 2020-07-17 NOTE — PROGRESS NOTES
Subjective:   54 y.o. female for Well Woman Check. Her gyne and breast care is done elsewhere by her Ob-Gyne physician. Pap completed in November with Beth Stubbs. Patient Active Problem List    Diagnosis Date Noted    Low back pain 05/29/2018    Right sided sciatica 05/29/2018    Status post lumbar spinal fusion 05/29/2018    Cervicalgia 01/12/2018    Degenerative disc disease, cervical 01/12/2018    Perimenopausal disorder 10/30/2017    Cervical radiculitis 07/07/2017    Multinodular goiter 12/19/2016    Benign neoplasm of muscle 10/26/2016    Acute vaginitis 08/25/2016    Increased frequency of urination 10/23/2015    Nephrolithiasis 04/15/2015    HTN (hypertension) 12/22/2011    Allergic rhinitis 12/22/2011     Current Outpatient Medications   Medication Sig Dispense Refill    cholecalciferol, vitamin D3, (VITAMIN D3 PO) Take  by mouth.  vitamin E (AQUA GEMS) 400 unit capsule Take  by mouth daily.  lisinopril (PRINIVIL, ZESTRIL) 40 mg tablet TAKE 1 TABLET BY MOUTH EVERY DAY 90 Tab 3    ALPRAZolam (XANAX) 0.5 mg tablet Take 0.5-1 Tabs by mouth two (2) times daily as needed for Anxiety. Max Daily Amount: 1 mg. 30 Tab 1    loratadine (CLARITIN) 10 mg tablet Take 10 mg by mouth daily as needed.  triamcinolone (NASACORT AQ) 55 mcg nasal inhaler 2 Sprays as needed.        Allergies   Allergen Reactions    Naprosyn [Naproxen] Other (comments)     GI distress and loose stools    Hydrochlorothiazide Other (comments)     Lightheaded/dizzy    Macrobid [Nitrofurantoin Monohyd/M-Cryst] Nausea Only    Sulfa (Sulfonamide Antibiotics) Unknown (comments)     Past Medical History:   Diagnosis Date    Anxiety     Calculus of kidney 2014    seen on ultrasound - no sxs ever    Environmental allergies     GERD (gastroesophageal reflux disease)     hiatal hernia    Hypertension      Past Surgical History:   Procedure Laterality Date    HX COLONOSCOPY  12/2011    Eran f/u 10 years  NEUROLOGICAL PROCEDURE UNLISTED  2001    lumbar hodge., redo w/L5-S1 fusion (11/02-Ken)     Family History   Problem Relation Age of Onset    Cancer Father         prostate    Hypertension Father     Diabetes Father      Social History     Tobacco Use    Smoking status: Never Smoker    Smokeless tobacco: Never Used   Substance Use Topics    Alcohol use: No     Alcohol/week: 0.0 standard drinks                 Specific concerns today: Saw Dr. Pillo Cotton in endocrine in March. CMP, TSH, parathyroid hormone tucker. Following multinodular goiter and possible parathyroid adenoma. .  Diagnosed with dry eyes. Drops are helping. BP at home running 130/80s. Exercising daily. Review of Systems  Constitutional: negative except for lost weight secondary to stress wheb started working home but has gained some back. Eyes: negative except for dry eyes  Ears, nose, mouth, throat, and face: negative  Respiratory: negative  Cardiovascular: negative  Gastrointestinal: negative except for GERD - taking prilosec 1 qd. some loose bowels  Genitourinary:negative  Integument/breast: negative  Hematologic/lymphatic: negative  Musculoskeletal:negative  Neurological: negative  Behavioral/Psych: negative  Endocrine: negative  Allergic/Immunologic: negative except for allergies have improved    Objective: There were no vitals taken for this visit.    Physical Examination:   General appearance - alert, well appearing, and in no distress and oriented to person, place, and time  Mental status - alert, oriented to person, place, and time, normal mood, behavior, speech, dress, motor activity, and thought processes  Eyes - pupils equal and reactive, extraocular eye movements intact  Ears - bilateral TM's and external ear canals normal  Nose - normal and patent, no erythema, discharge or polyps  Mouth - mucous membranes moist, pharynx normal without lesions  Neck - supple, no significant adenopathy, carotids upstroke normal bilaterally, no bruits, thyroid exam: thyroid is normal in size without nodules or tenderness  Lymphatics - no palpable lymphadenopathy, no hepatosplenomegaly  Chest - clear to auscultation, no wheezes, rales or rhonchi, symmetric air entry  Heart - normal rate, regular rhythm, normal S1, S2, no murmurs, rubs, clicks or gallops  Abdomen - soft, nontender, nondistended, no masses or organomegaly  bowel sounds normal  Breasts - breasts appear normal, no suspicious masses, no skin or nipple changes or axillary nodes  Back exam - full range of motion, no tenderness, palpable spasm or pain on motion  Neurological - alert, oriented, normal speech, no focal findings or movement disorder noted  Musculoskeletal - no joint tenderness, deformity or swelling  Extremities - peripheral pulses normal, no pedal edema, no clubbing or cyanosis  Skin - normal coloration and turgor, no rashes, no suspicious skin lesions noted     Assessment/Plan:   49yo female here for PE  htn - slightly elevated today but better controlled at home. Continue same meds  All rhinitis - controlled, cont same  Multinodular goiter and possible parathyroid adenoma - followed by endocrine.  - check labs.   utd pap  call if any problems  Orders Placed This Encounter    METABOLIC PANEL, COMPREHENSIVE    LIPID PANEL    VITAMIN D, 25 HYDROXY    CBC W/O DIFF    omega 3-dha-epa-fish oil (Fish Oil) 100-160-1,000 mg cap    multivitamin (ONE A DAY) tablet    DISCONTD: erythromycin (ILOTYCIN) ophthalmic ointment    carboxymethylcellulose sodium (REFRESH LIQUIGEL) 1 % dlgl ophthalmic solution

## 2020-07-17 NOTE — PROGRESS NOTES
Patient has been informed of any other discussion outside the parameters of the physical could result in other charges including a co pay, patient verbalized understanding. 1. Have you been to the ER, urgent care clinic since your last visit? Hospitalized since your last visit? No    2. Have you seen or consulted any other health care providers outside of the [address] [place] [address] since your last visit? Include any pap smears or colon screening.  Yes    Chief Complaint   Patient presents with    Complete Physical

## 2020-07-21 LAB
25(OH)D3+25(OH)D2 SERPL-MCNC: 41.3 NG/ML (ref 30–100)
ALBUMIN SERPL-MCNC: 4.5 G/DL (ref 3.8–4.9)
ALBUMIN/GLOB SERPL: 1.7 {RATIO} (ref 1.2–2.2)
ALP SERPL-CCNC: 75 IU/L (ref 39–117)
ALT SERPL-CCNC: 19 IU/L (ref 0–32)
AST SERPL-CCNC: 22 IU/L (ref 0–40)
BILIRUB SERPL-MCNC: 0.4 MG/DL (ref 0–1.2)
BUN SERPL-MCNC: 16 MG/DL (ref 6–24)
BUN/CREAT SERPL: 20 (ref 9–23)
CALCIUM SERPL-MCNC: 9.9 MG/DL (ref 8.7–10.2)
CHLORIDE SERPL-SCNC: 104 MMOL/L (ref 96–106)
CHOLEST SERPL-MCNC: 225 MG/DL (ref 100–199)
CO2 SERPL-SCNC: 25 MMOL/L (ref 20–29)
CREAT SERPL-MCNC: 0.81 MG/DL (ref 0.57–1)
ERYTHROCYTE [DISTWIDTH] IN BLOOD BY AUTOMATED COUNT: 13.7 % (ref 11.7–15.4)
GLOBULIN SER CALC-MCNC: 2.6 G/DL (ref 1.5–4.5)
GLUCOSE SERPL-MCNC: 113 MG/DL (ref 65–99)
HCT VFR BLD AUTO: 37.7 % (ref 34–46.6)
HDLC SERPL-MCNC: 94 MG/DL
HGB BLD-MCNC: 12.5 G/DL (ref 11.1–15.9)
INTERPRETATION, 910389: NORMAL
LDLC SERPL CALC-MCNC: 123 MG/DL (ref 0–99)
MCH RBC QN AUTO: 29 PG (ref 26.6–33)
MCHC RBC AUTO-ENTMCNC: 33.2 G/DL (ref 31.5–35.7)
MCV RBC AUTO: 88 FL (ref 79–97)
PLATELET # BLD AUTO: 269 X10E3/UL (ref 150–450)
POTASSIUM SERPL-SCNC: 4.1 MMOL/L (ref 3.5–5.2)
PROT SERPL-MCNC: 7.1 G/DL (ref 6–8.5)
RBC # BLD AUTO: 4.31 X10E6/UL (ref 3.77–5.28)
SODIUM SERPL-SCNC: 142 MMOL/L (ref 134–144)
TRIGL SERPL-MCNC: 41 MG/DL (ref 0–149)
VLDLC SERPL CALC-MCNC: 8 MG/DL (ref 5–40)
WBC # BLD AUTO: 5.2 X10E3/UL (ref 3.4–10.8)

## 2020-07-28 ENCOUNTER — TELEPHONE (OUTPATIENT)
Dept: INTERNAL MEDICINE CLINIC | Age: 55
End: 2020-07-28

## 2020-07-28 NOTE — LETTER
7/28/2020 11:37 AM 
 
Ms. Brian Garber 
[address] [facility] [address] [id number] Written by Jim Bell MD on 7/22/2020  8:50 AM  
Your blood sugar was mildly elevated.  Were your fasting for the labs? Your cholesterol has increased from last visit but is still at goal of LDL < 130.    
Your vitamin D level has improved.  Please continue your vitamin D supplements.    
The rest of your lab work is normal.  
 
 
 
 
 
Sincerely, Alexei Tian MD

## 2020-08-10 RX ORDER — LISINOPRIL 40 MG/1
TABLET ORAL
Qty: 90 TAB | Refills: 3 | Status: SHIPPED | OUTPATIENT
Start: 2020-08-10 | End: 2021-08-09

## 2020-10-02 ENCOUNTER — VIRTUAL VISIT (OUTPATIENT)
Dept: INTERNAL MEDICINE CLINIC | Age: 55
End: 2020-10-02
Payer: COMMERCIAL

## 2020-10-02 ENCOUNTER — TELEPHONE (OUTPATIENT)
Dept: INTERNAL MEDICINE CLINIC | Age: 55
End: 2020-10-02

## 2020-10-02 DIAGNOSIS — J34.89 SINUS PRESSURE: Primary | ICD-10-CM

## 2020-10-02 PROCEDURE — 99213 OFFICE O/P EST LOW 20 MIN: CPT | Performed by: PHYSICIAN ASSISTANT

## 2020-10-02 RX ORDER — CEFDINIR 300 MG/1
300 CAPSULE ORAL 2 TIMES DAILY
Qty: 20 CAP | Refills: 0 | Status: SHIPPED | OUTPATIENT
Start: 2020-10-02 | End: 2020-12-11 | Stop reason: ALTCHOICE

## 2020-10-02 NOTE — PROGRESS NOTES
1. Have you been to the ER, urgent care clinic since your last visit? Hospitalized since your last visit? No    2. Have you seen or consulted any other health care providers outside of the [address] [place] [address] since your last visit? Include any pap smears or colon screening. Yes    Chief Complaint   Patient presents with    Sinus Pain     and pressure       Please send link to   Margie Vang is a 54 y.o. female who was seen by synchronous (real-time) audio-video technology on 10/2/2020 for Sinus Pain (and pressure)        Assessment & Plan:   Diagnoses and all orders for this visit:    1. Sinus pressure  -     cefdinir (OMNICEF) 300 mg capsule; Take 1 Cap by mouth two (2) times a day. I have asked the patient to go to the pharmacy and  Claritin-D or either Allegra-D. I like for her to take this for the next 48 hours to see if this alleviates the sinus pressure. The patient symptoms still persist there will be a prescription at the pharmacy that she may . Advised her to continue to stay well-hydrated and to please follow-up with Dr. Lenny Mora or myself if symptoms do not improve. I spent at least 15 minutes on this visit with this established patient. 712  Subjective:   Patient presents today for evaluation of sinus pressure. She reports for the past last week she has been having frontal sinus pressure. Patient reports that she has been using her nasal saline or Flonase and Claritin. Patient reports that she is not blowing discolored mucus and denies having sore throat. Patient reports that she does have some fullness of her ears. No fever reported. Prior to Admission medications    Medication Sig Start Date End Date Taking? Authorizing Provider   lisinopriL (PRINIVIL, ZESTRIL) 40 mg tablet TAKE 1 TABLET BY MOUTH EVERY DAY 8/10/20  Yes Charley Dumont MD   omega 1-cbn-atm-fish oil (Fish Oil) 100-160-1,000 mg cap Take  by mouth.    Yes Provider, Historical   multivitamin (ONE A DAY) tablet Take 1 Tab by mouth daily. Yes Provider, Historical   carboxymethylcellulose sodium (REFRESH LIQUIGEL) 1 % dlgl ophthalmic solution 1 Drop as needed. Yes Provider, Historical   cholecalciferol, vitamin D3, (VITAMIN D3 PO) Take  by mouth. Yes Provider, Historical   loratadine (CLARITIN) 10 mg tablet Take 10 mg by mouth daily as needed. Yes Provider, Historical     Patient Active Problem List    Diagnosis Date Noted    Low back pain 05/29/2018    Right sided sciatica 05/29/2018    Status post lumbar spinal fusion 05/29/2018    Cervicalgia 01/12/2018    Degenerative disc disease, cervical 01/12/2018    Perimenopausal disorder 10/30/2017    Cervical radiculitis 07/07/2017    Multinodular goiter 12/19/2016    Benign neoplasm of muscle 10/26/2016    Acute vaginitis 08/25/2016    Increased frequency of urination 10/23/2015    Nephrolithiasis 04/15/2015    HTN (hypertension) 12/22/2011    Allergic rhinitis 12/22/2011     Current Outpatient Medications   Medication Sig Dispense Refill    cefdinir (OMNICEF) 300 mg capsule Take 1 Cap by mouth two (2) times a day. 20 Cap 0    lisinopriL (PRINIVIL, ZESTRIL) 40 mg tablet TAKE 1 TABLET BY MOUTH EVERY DAY 90 Tab 3    omega 3-dha-epa-fish oil (Fish Oil) 100-160-1,000 mg cap Take  by mouth.  multivitamin (ONE A DAY) tablet Take 1 Tab by mouth daily.  carboxymethylcellulose sodium (REFRESH LIQUIGEL) 1 % dlgl ophthalmic solution 1 Drop as needed.  cholecalciferol, vitamin D3, (VITAMIN D3 PO) Take  by mouth.  loratadine (CLARITIN) 10 mg tablet Take 10 mg by mouth daily as needed.        Allergies   Allergen Reactions    Naprosyn [Naproxen] Other (comments)     GI distress and loose stools    Hydrochlorothiazide Other (comments)     Lightheaded/dizzy    Macrobid [Nitrofurantoin Monohyd/M-Cryst] Nausea Only    Sulfa (Sulfonamide Antibiotics) Unknown (comments)       ROS  See above  Objective:     Patient-Reported Vitals 10/2/2020 Patient-Reported Temperature 97.0      General: alert, cooperative, no distress   Mental  status: normal mood, behavior, speech, dress, motor activity, and thought processes, able to follow commands   HENT:  Tenderness to palpation noted over the left frontal sinus area   Neck: no visualized mass   Resp: no respiratory distress   Neuro: no gross deficits   Skin: no discoloration or lesions of concern on visible areas   Psychiatric: normal affect, consistent with stated mood, no evidence of hallucinations     Additional exam findings: We discussed the expected course, resolution and complications of the diagnosis(es) in detail. Medication risks, benefits, costs, interactions, and alternatives were discussed as indicated. I advised her to contact the office if her condition worsens, changes or fails to improve as anticipated. She expressed understanding with the diagnosis(es) and plan. Narayan Monteiro, who was evaluated through a patient-initiated, synchronous (real-time) audio-video encounter, and/or her healthcare decision maker, is aware that it is a billable service, with coverage as determined by her insurance carrier. She provided verbal consent to proceed: Yes, and patient identification was verified. It was conducted pursuant to the emergency declaration under the [facility], [address] [place] [address] authority and the [employer] General Act. A caregiver was present when appropriate. Ability to conduct physical exam was limited. I was at home. The patient was at home.       Colin Yanes PA-C

## 2020-10-02 NOTE — TELEPHONE ENCOUNTER
Patient is calling she had a virtual with Daja this morning and was told to samuel Claritin D pt states Claritin D raises your bp and she didn't want to take that, please advise [phone]

## 2020-10-02 NOTE — TELEPHONE ENCOUNTER
Advised the patient that if she is concerned about her blood pressure and not wanting to take the Claritin-D 12-hour or Claritin-D 24-hour another option would be to take plain Sudafed. This is the one that is short acting and then she would be able to monitor her blood pressure. If she finds her blood pressure seems to be increasing then stop the Sudafed. If she is a person that blood pressure is not well controlled then I would advise against the decongestant altogether. The patient shares she is already staying well-hydrated and currently already using a nasal saline spray. The only other thing that she could try would be a [place] pot or trying breathing in steam from a pot of warm water. Also she could try a warm wet towel over her face to see if this gives her any relief.

## 2020-11-30 ENCOUNTER — TELEPHONE (OUTPATIENT)
Dept: INTERNAL MEDICINE CLINIC | Age: 55
End: 2020-11-30

## 2020-11-30 DIAGNOSIS — F41.9 ANXIETY: ICD-10-CM

## 2020-11-30 RX ORDER — ALPRAZOLAM 0.5 MG/1
.25-.5 TABLET ORAL
Qty: 30 TAB | Refills: 1 | Status: SHIPPED | OUTPATIENT
Start: 2020-11-30

## 2020-11-30 NOTE — TELEPHONE ENCOUNTER
Caller (if not patient):N/A   Relationship of caller (if not patient): N/A   Best contact number(s): [phone]   Name of medication and dosage if known: \"Alprazolam 0.5 mg\"   Is patient out of this medication (yes/no): Yes   Pharmacy name: [facility]   Pharmacy listed in chart? (yes/no): Yes   Pharmacy phone number: unable to provide   Date of last visit: 10/02/20   Details to clarify the request: Pt advises medication  and is currently out of them.      envera

## 2020-12-10 ENCOUNTER — NURSE TRIAGE (OUTPATIENT)
Dept: OTHER | Facility: CLINIC | Age: 55
End: 2020-12-10

## 2020-12-10 NOTE — TELEPHONE ENCOUNTER
Patient called in via BS provider/office Line to report having symptoms of headaches and dizziness. States symptoms started 2 weeks ago. Rates pain as moderate. Reports feeling dizziness/off balance and eye pain as well. States was diagnosed with migraines last year but did not like side effects of medication prescribed. Denies weakness/numbness, loss of speech, injury or vision changes. Patient informed of disposition. Caller provided care advice and instructed to call back with worsening symptoms. Patient verbalized understanding and denies any further questions/concerns. Soft transfer to [facility] to schedule appointment as recommended. Attention provider: Your patient utilized nurse triage services offered by employer, payer or community. This encounter includes an overview of the reason for call, assessment and recommended disposition. Please do not respond through this encounter as the response is not directed to a shared pool. Reason for Disposition   Patient wants to be seen    Answer Assessment - Initial Assessment Questions  1. LOCATION: \"Where does it hurt? \"       Front and left side    2. ONSET: \"When did the headache start? \" (Minutes, hours or days)       2 weeks ago    3. PATTERN: \"Does the pain come and go, or has it been constant since it started? \"      Intermittent    4. SEVERITY: \"How bad is the pain? \" and \"What does it keep you from doing? \"  (e.g., Scale 1-10; mild, moderate, or severe)    - MILD (1-3): doesn't interfere with normal activities     - MODERATE (4-7): interferes with normal activities or awakens from sleep     - SEVERE (8-10): excruciating pain, unable to do any normal activities         Moderate    5. RECURRENT SYMPTOM: \"Have you ever had headaches before? \" If so, ask: \"When was the last time? \" and \"What happened that time? \"       Yes-MD office-dx with migraine    6. CAUSE: \"What do you think is causing the headache? \"      Migraine    7.  MIGRAINE: \"Have you been diagnosed with migraine headaches? \" If so, ask: \"Is this headache similar? \"       Yes    8. HEAD INJURY: \"Has there been any recent injury to the head? \"       No    9. OTHER SYMPTOMS: \"Do you have any other symptoms? \" (fever, stiff neck, eye pain, sore throat, cold symptoms)      Eye pain, dizziness    10. PREGNANCY: \"Is there any chance you are pregnant? \" \"When was your last menstrual period? \"        No    Protocols used: HEADACHE-ADULT-[place]

## 2020-12-11 ENCOUNTER — OFFICE VISIT (OUTPATIENT)
Dept: INTERNAL MEDICINE CLINIC | Age: 55
End: 2020-12-11
Payer: COMMERCIAL

## 2020-12-11 VITALS
RESPIRATION RATE: 18 BRPM | BODY MASS INDEX: 25.88 KG/M2 | HEART RATE: 77 BPM | HEIGHT: 66 IN | DIASTOLIC BLOOD PRESSURE: 82 MMHG | SYSTOLIC BLOOD PRESSURE: 125 MMHG | WEIGHT: 161 LBS | OXYGEN SATURATION: 99 % | TEMPERATURE: 98 F

## 2020-12-11 DIAGNOSIS — J01.10 ACUTE NON-RECURRENT FRONTAL SINUSITIS: Primary | ICD-10-CM

## 2020-12-11 PROCEDURE — 99213 OFFICE O/P EST LOW 20 MIN: CPT | Performed by: INTERNAL MEDICINE

## 2020-12-11 RX ORDER — AMOXICILLIN AND CLAVULANATE POTASSIUM 875; 125 MG/1; MG/1
1 TABLET, FILM COATED ORAL EVERY 12 HOURS
Qty: 20 TAB | Refills: 0 | Status: SHIPPED | OUTPATIENT
Start: 2020-12-11 | End: 2020-12-21

## 2020-12-11 NOTE — PATIENT INSTRUCTIONS
Start Flonase 2 squirts each nostril once daily. Start Mucinex 1200mg twice a day. Plain not D or DM. Continue rest of allergy/sinus medications.

## 2020-12-11 NOTE — PROGRESS NOTES
HISTORY OF PRESENT ILLNESS  Argentina Carvalho is a 54 y.o. female. HPI  Here for lightheadedness and dizziness. Daily for 1 week frontal headache, pressure and lightheaded. Worse when bends over. + sinus congestion, mostly clear rhinorrhea but some mucous. No cough or sore throat. Using Claritin, saline ns tid and netti pot 2 times a week. MIKE Godfrey prescribed Omnicef in October but never took. Mild right ear pain. Brother  of pancreatic cancer in October. Current Outpatient Medications:     amoxicillin-clavulanate (AUGMENTIN) 875-125 mg per tablet, Take 1 Tab by mouth every twelve (12) hours for 10 days. , Disp: 20 Tab, Rfl: 0    ALPRAZolam (XANAX) 0.5 mg tablet, Take 0.5-1 Tabs by mouth two (2) times daily as needed for Anxiety. Max Daily Amount: 1 mg., Disp: 30 Tab, Rfl: 1    lisinopriL (PRINIVIL, ZESTRIL) 40 mg tablet, TAKE 1 TABLET BY MOUTH EVERY DAY, Disp: 90 Tab, Rfl: 3    omega 3-dha-epa-fish oil (Fish Oil) 100-160-1,000 mg cap, Take  by mouth., Disp: , Rfl:     multivitamin (ONE A DAY) tablet, Take 1 Tab by mouth daily. , Disp: , Rfl:     carboxymethylcellulose sodium (REFRESH LIQUIGEL) 1 % dlgl ophthalmic solution, 1 Drop as needed. , Disp: , Rfl:     cholecalciferol, vitamin D3, (VITAMIN D3 PO), Take  by mouth., Disp: , Rfl:     loratadine (CLARITIN) 10 mg tablet, Take 10 mg by mouth daily as needed. , Disp: , Rfl:     Visit Vitals  /82   Pulse 77   Temp 98 °F (36.7 °C)   Resp 18   Ht 5' 6\" (1.676 m)   Wt 161 lb (73 kg)   SpO2 99%   BMI 25.99 kg/m²       ROS  See above. Physical Exam  Vitals signs reviewed. Constitutional:       Appearance: Normal appearance. HENT:      Head: Normocephalic and atraumatic.       Comments: Tenderness bilat frontal sinuses     Right Ear: Ear canal and external ear normal.      Left Ear: Ear canal and external ear normal.      Ears:      Comments: bilat TM fullness without erythema  Eyes:      Comments: Periorbital swelling/allergic oriana   Cardiovascular:      Rate and Rhythm: Normal rate and regular rhythm. Pulses: Normal pulses. Heart sounds: Normal heart sounds. Pulmonary:      Effort: Pulmonary effort is normal.      Breath sounds: Normal breath sounds. Neurological:      Mental Status: She is alert. ASSESSMENT and PLAN  Acute sinusitis - add mucinex and Flonase. Continue Claritin, Saline ns and Netti Pot. Augmentin.     Orders Placed This Encounter    amoxicillin-clavulanate (AUGMENTIN) 875-125 mg per tablet

## 2020-12-11 NOTE — PROGRESS NOTES
1. Have you been to the ER, urgent care clinic since your last visit? Hospitalized since your last visit? No    2. Have you seen or consulted any other health care providers outside of the [address] [place] [address] since your last visit? Include any pap smears or colon screening.  No  Chief Complaint   Patient presents with    Headache     pain 10 out of 10

## 2021-03-06 ENCOUNTER — OFFICE VISIT (OUTPATIENT)
Dept: URGENT CARE | Age: 56
End: 2021-03-06
Payer: COMMERCIAL

## 2021-03-06 VITALS — HEART RATE: 92 BPM | RESPIRATION RATE: 17 BRPM | TEMPERATURE: 98.6 F | OXYGEN SATURATION: 98 %

## 2021-03-06 DIAGNOSIS — J00 ACUTE RHINITIS: Primary | ICD-10-CM

## 2021-03-06 DIAGNOSIS — H93.8X3 PRESSURE SENSATION IN BOTH EARS: ICD-10-CM

## 2021-03-06 LAB — SARS-COV-2 POC: NEGATIVE

## 2021-03-06 PROCEDURE — 99202 OFFICE O/P NEW SF 15 MIN: CPT | Performed by: NURSE PRACTITIONER

## 2021-03-06 PROCEDURE — 87426 SARSCOV CORONAVIRUS AG IA: CPT | Performed by: NURSE PRACTITIONER

## 2021-03-06 NOTE — PROGRESS NOTES
Subjective: (As above and below)       This patient was seen in Flu Clinic at [facility] while outdoors at their vehicle due to COVID-19 pandemic with PPE and focused examination in order to decrease community viral transmission.   The patient/guardian gave verbal consent to treat.    Chief Complaint   Patient presents with   • Cold Symptoms     cough congestion annamaria ear pain for a week      Evelina Johnson is a 56 y.o. female who presents for evaluation of: nasal congestion, post nasal drip and ear pressure R>L. Symptom onset 1 week . Preceding illness: none.  No other identified aggravating or alleviating factors. Symptoms are constant and overall unchanged. Promotes no decrease in PO intake of fluids. Denies: severe lethargy, SOB, syncope/near syncope, vomiting/diarrhea, chest pain, chest pain with breathing, labored breathing, severe headache,  fevers .   Hx of seasonal allergies. Requesting covid 19 test. Is vaccinated for covid 19.    Recent travel: no  Known Exposure to COVID-19: no  Known flu or strep contact: no       Review of Systems - negative except as listed above    Reviewed PmHx, RxHx, FmHx, SocHx, AllgHx and updated in chart.  Family History   Problem Relation Age of Onset   • Cancer Father         prostate   • Hypertension Father    • Diabetes Father        Past Medical History:   Diagnosis Date   • Anxiety    • Calculus of kidney 2014    seen on ultrasound - no sxs ever   • Environmental allergies    • GERD (gastroesophageal reflux disease)     hiatal hernia   • Hypertension       Social History     Socioeconomic History   • Marital status:      Spouse name: Not on file   • Number of children: Not on file   • Years of education: Not on file   • Highest education level: Not on file   Tobacco Use   • Smoking status: Never Smoker   • Smokeless tobacco: Never Used   Substance and Sexual Activity   • Alcohol use: No     Alcohol/week: 0.0 standard drinks   • Drug use: No   •  Sexual activity: Yes     Partners: Male          Current Outpatient Medications   Medication Sig    ALPRAZolam (XANAX) 0.5 mg tablet Take 0.5-1 Tabs by mouth two (2) times daily as needed for Anxiety. Max Daily Amount: 1 mg.  lisinopriL (PRINIVIL, ZESTRIL) 40 mg tablet TAKE 1 TABLET BY MOUTH EVERY DAY    omega 3-dha-epa-fish oil (Fish Oil) 100-160-1,000 mg cap Take  by mouth.  multivitamin (ONE A DAY) tablet Take 1 Tab by mouth daily.  carboxymethylcellulose sodium (REFRESH LIQUIGEL) 1 % dlgl ophthalmic solution 1 Drop as needed.  cholecalciferol, vitamin D3, (VITAMIN D3 PO) Take  by mouth.  loratadine (CLARITIN) 10 mg tablet Take 10 mg by mouth daily as needed. No current facility-administered medications for this visit. Objective:     Vitals:    03/06/21 1523   Pulse: 92   Resp: 17   Temp: 98.6 °F (37 °C)   SpO2: 98%       Physical Exam  General appearance  appears well hydrated and does not appear toxic, no acute distress  Eyes - EOMs intact. Non injected. No scleral icterus   Ears - no external swelling  Nose - nasal congestion. No purulent drainage  Mouth - OP clear without swelling, exudate or lesion. Mucus membranes moist. Uvula midline. Neck/Lymphatics  trachea midline, full AROM  Chest - Normal breathing effort no wheeze rales, rhonchi or diminishments bilaterally. Heart - RRR, no murmurs  Skin - no observable rashes or pallor  Neurologic- alert and oriented x 3  Psychiatric- normal mood, behavior and though content. Assessment/ Plan:     1. Acute rhinitis    - AMB POC SARS-COV-2    2. Pressure sensation in both ears    Rapid covid 19 test negative  ddx viral URI vs allergies  Continue flonase. Start xyzal. Discontinue claritin  No evidence suggesting complication of illness at this time. Will discharge home with close monitoring and follow up.   Supportive home care for mild symptoms advised- maintain adequate fluid intake, lozenges, over the counter Tylenol (for fever, aches, pains, chills), deep breathing exercises  Recommendation for self isolation/quarantine based on current CDC guidelines      Follow up: We have reviewed worsening/concerning signs and symptoms that may warrant seeking immediate care in the ED setting. For other non-severe changes, non-improvement or questions, patient aware to contact PCP office or consider a virtual online medical consultation.         Raisa Lara, NP

## 2021-03-12 ENCOUNTER — NURSE TRIAGE (OUTPATIENT)
Dept: OTHER | Facility: CLINIC | Age: 56
End: 2021-03-12

## 2021-03-12 ENCOUNTER — TELEPHONE (OUTPATIENT)
Dept: INTERNAL MEDICINE CLINIC | Age: 56
End: 2021-03-12

## 2021-03-12 NOTE — TELEPHONE ENCOUNTER
Reason for call: The nurse suggested that she get a CXR but [facility] to r/o pneumonia.        Callback required yes/no and why: Yes       Best contact number(s):[phone]     RACHAEL

## 2021-03-12 NOTE — TELEPHONE ENCOUNTER
[address]     Reason for Disposition   MILD difficulty breathing (e.g., minimal/no SOB at rest, SOB with walking, pulse <100)    Answer Assessment - Initial Assessment Questions  1. COVID-19 DIAGNOSIS: \"Who made your Coronavirus (COVID-19) diagnosis? \" \"Was it confirmed by a positive lab test?\" If not diagnosed by a HCP, ask \"Are there lots of cases (community spread) where you live? \" (See public health department website, if unsure)      Negative test on 3/06/2021    2. COVID-19 EXPOSURE: \"Was there any known exposure to COVID before the symptoms began? \" CDC Definition of close contact: within 6 feet (2 meters) for a total of 15 minutes or more over a 24-hour period. No    3. ONSET: \"When did the COVID-19 symptoms start? \"   First week in February     4. WORST SYMPTOM: \"What is your worst symptom? \" (e.g., cough, fever, shortness of breath, muscle aches)  Cough, mild SOB      5. COUGH: \"Do you have a cough? \" If so, ask: \"How bad is the cough? \"    Moderate productive     6. FEVER: \"Do you have a fever? \" If so, ask: \"What is your temperature, how was it measured, and when did it start? \"  Denies     7. RESPIRATORY STATUS: \"Describe your breathing? \" (e.g., shortness of breath, wheezing, unable to speak)   \"Breathing feels funny\"; reports mild SOB on exertion at times     8. BETTER-SAME-WORSE: Devan Gauze you getting better, staying the same or getting worse compared to yesterday? \"  If getting worse, ask, \"In what way? \"   Same    9. HIGH RISK DISEASE: \"Do you have any chronic medical problems? \" (e.g., asthma, heart or lung disease, weak immune system, etc.)   Denies         10. PREGNANCY: \"Is there any chance you are pregnant? \" \"When was your last menstrual period? \"      No    11. OTHER SYMPTOMS: \"Do you have any other symptoms? \"  (e.g., chills, fatigue, headache, loss of smell or taste, muscle pain, sore throat)     Chills, SOB, cough, mild fatigue and weakness    Protocols used: CORONAVIRUS (COVID-19) DIAGNOSED OR SUSPECTED-ADULT-[place]

## 2021-03-12 NOTE — TELEPHONE ENCOUNTER
She should go to urgent care to be retested for covid and for someone to listen to her lungs. They can do a CXR if indicated.

## 2021-03-17 ENCOUNTER — TELEPHONE (OUTPATIENT)
Dept: INTERNAL MEDICINE CLINIC | Age: 56
End: 2021-03-17

## 2021-03-17 RX ORDER — BENZONATATE 200 MG/1
200 CAPSULE ORAL
Qty: 20 CAP | Refills: 0 | Status: SHIPPED | OUTPATIENT
Start: 2021-03-17 | End: 2021-03-24

## 2021-03-17 NOTE — TELEPHONE ENCOUNTER
Reason for call: Requesting a call, stated she had a chest xray done and it was just bronchitis and would like something to help with her cough.        Callback required yes/no and why: Yes     Best contact number(s): [phone]       Details to clarify the request:     jace

## 2021-03-17 NOTE — TELEPHONE ENCOUNTER
Message has been conveyed to patient per Dr. Priyanka Hubbard.  Patient verbalized understanding no further questions

## 2021-04-30 ENCOUNTER — OFFICE VISIT (OUTPATIENT)
Dept: INTERNAL MEDICINE CLINIC | Age: 56
End: 2021-04-30
Payer: COMMERCIAL

## 2021-04-30 VITALS
DIASTOLIC BLOOD PRESSURE: 85 MMHG | HEIGHT: 66 IN | HEART RATE: 77 BPM | WEIGHT: 166 LBS | TEMPERATURE: 98.3 F | SYSTOLIC BLOOD PRESSURE: 144 MMHG | RESPIRATION RATE: 20 BRPM | BODY MASS INDEX: 26.68 KG/M2 | OXYGEN SATURATION: 98 %

## 2021-04-30 DIAGNOSIS — I10 ESSENTIAL HYPERTENSION: Primary | ICD-10-CM

## 2021-04-30 DIAGNOSIS — K21.9 GASTROESOPHAGEAL REFLUX DISEASE, UNSPECIFIED WHETHER ESOPHAGITIS PRESENT: ICD-10-CM

## 2021-04-30 DIAGNOSIS — J30.2 SEASONAL ALLERGIC RHINITIS, UNSPECIFIED TRIGGER: ICD-10-CM

## 2021-04-30 PROCEDURE — 99214 OFFICE O/P EST MOD 30 MIN: CPT | Performed by: INTERNAL MEDICINE

## 2021-04-30 RX ORDER — BACLOFEN 10 MG/1
TABLET ORAL
COMMUNITY
Start: 2021-02-11 | End: 2021-12-27

## 2021-04-30 RX ORDER — LEVOCETIRIZINE DIHYDROCHLORIDE 5 MG/1
5 TABLET, FILM COATED ORAL
COMMUNITY
End: 2021-06-18 | Stop reason: ALTCHOICE

## 2021-04-30 NOTE — PATIENT INSTRUCTIONS
For Allergies:  Allegra (Fexofenidine) 180mg daily. Flonase nasal spray 2 squirts each nostril daily. For Stomach  2 weeks of Pepcid (Famoditine) 20mg daily. Then take as needed for reflux. [place]

## 2021-04-30 NOTE — PROGRESS NOTES
Subjective:     Tree Metcalf is a 64 y.o. female who presents for follow up of hypertension. Diet and Lifestyle: generally follows a low sodium diet  Home BP Monitoring: borderline at home 140/80s, elevated at urgent care 170/90. Cardiovascular ROS: taking medications as instructed, no medication side effects noted, no TIA's, no chest pain on exertion, no swelling of ankles, no orthostatic dizziness or lightheadedness, no palpitations. New concerns:   Started end of Feb and beginning of sob and coughing. Feels having some issues with swallowing as well. Throat is irritated. No wheezing. Went to urgent care - cxr was normal.  Placed on Advair but did not start. [place] was a bronchitis. Changed from Clraitin to Xyzal but taking only 1/2 tab but not consistent.  + rhinorrhea, sneezing and itching eyes. Acid reflux. Has decreased her diet secondary to reflux. Taking prebiotic/probiotic. Will occ take Prilosec. No n/v. Loose to watery stools in am - every am.      Current Outpatient Medications   Medication Sig Dispense Refill    baclofen (LIORESAL) 10 mg tablet TAKE A HALF TO 1 TABLET BY MOUTH EVERY 12 HOURS AS NEEDED      levocetirizine (Xyzal) 5 mg tablet Take 5 mg by mouth daily as needed for Allergies.  ALPRAZolam (XANAX) 0.5 mg tablet Take 0.5-1 Tabs by mouth two (2) times daily as needed for Anxiety. Max Daily Amount: 1 mg. 30 Tab 1    lisinopriL (PRINIVIL, ZESTRIL) 40 mg tablet TAKE 1 TABLET BY MOUTH EVERY DAY 90 Tab 3    omega 3-dha-epa-fish oil (Fish Oil) 100-160-1,000 mg cap Take  by mouth.  multivitamin (ONE A DAY) tablet Take 1 Tab by mouth daily.  carboxymethylcellulose sodium (REFRESH LIQUIGEL) 1 % dlgl ophthalmic solution 1 Drop as needed.  cholecalciferol, vitamin D3, (VITAMIN D3 PO) Take  by mouth.           Lab Results   Component Value Date/Time    GFR est non-AA 82 07/17/2020 12:00 AM    GFRNA, POC >60 03/29/2011 07:50 AM    GFR est AA 95 07/17/2020 12:00 AM GFRAA, POC >60 03/29/2011 07:50 AM    Creatinine 0.81 07/17/2020 12:00 AM    Creatinine (POC) 0.6 03/29/2011 07:50 AM    BUN 16 07/17/2020 12:00 AM    Sodium 142 07/17/2020 12:00 AM    Potassium 4.1 07/17/2020 12:00 AM    Chloride 104 07/17/2020 12:00 AM    CO2 25 07/17/2020 12:00 AM    PTH, Intact 34 12/19/2016 09:57 AM        Review of Systems, additional:  Pertinent items are noted in HPI. Objective:     Visit Vitals  BP (!) 144/85   Pulse 77   Temp 98.3 °F (36.8 °C) (Temporal)   Resp 20   Ht 5' 6\" (1.676 m)   Wt 166 lb (75.3 kg)   SpO2 98%   BMI 26.79 kg/m²     Appearance: alert, well appearing, and in no distress and oriented to person, place, and time. General exam:  Nares - edematous with clear discharge  Sinuses - mild bilat frontal tenderness  OP - post nasal drainage   NECK: supple, no lad, no bruit, no tm  LUNGS: cta bilat  CV rrr, no m/g/r. Chest not tender. ABD: soft, nd, nabs, mild epigastric tenderness without guarding or rebound  EXT: no c/c/e. Javon Kick Assessment/Plan:     hypertension borderline controlled. Continue meds for now as not feeling well today. Will reassess at 2 week f/u    Allergic rhinitis - contributing to sob, issues swallowing etc  Start Flonase and Allegra regularly for next 2 weeks. GERD- ? Allergic rhinitis contributing. Start Famotidine 20mg every day  Stop prn prilosec. Orders Placed This Encounter    baclofen (LIORESAL) 10 mg tablet    levocetirizine (Xyzal) 5 mg tablet     Follow-up and Dispositions    · Return in about 2 weeks (around 5/14/2021) for htn, allergies and reflux.

## 2021-05-14 ENCOUNTER — OFFICE VISIT (OUTPATIENT)
Dept: INTERNAL MEDICINE CLINIC | Age: 56
End: 2021-05-14
Payer: COMMERCIAL

## 2021-05-14 VITALS
HEIGHT: 66 IN | RESPIRATION RATE: 16 BRPM | OXYGEN SATURATION: 99 % | SYSTOLIC BLOOD PRESSURE: 150 MMHG | BODY MASS INDEX: 26.87 KG/M2 | HEART RATE: 71 BPM | DIASTOLIC BLOOD PRESSURE: 88 MMHG | TEMPERATURE: 97.9 F | WEIGHT: 167.2 LBS

## 2021-05-14 DIAGNOSIS — I10 ESSENTIAL HYPERTENSION: Primary | ICD-10-CM

## 2021-05-14 PROCEDURE — 99213 OFFICE O/P EST LOW 20 MIN: CPT | Performed by: INTERNAL MEDICINE

## 2021-05-14 RX ORDER — ZOSTER VACCINE RECOMBINANT, ADJUVANTED 50 MCG/0.5
0.5 KIT INTRAMUSCULAR ONCE
Qty: 0.5 ML | Refills: 1 | Status: SHIPPED | OUTPATIENT
Start: 2021-05-14 | End: 2021-05-14

## 2021-05-14 RX ORDER — AMLODIPINE BESYLATE 5 MG/1
5 TABLET ORAL DAILY
Qty: 30 TAB | Refills: 3 | Status: SHIPPED | OUTPATIENT
Start: 2021-05-14 | End: 2021-08-06

## 2021-05-14 NOTE — PROGRESS NOTES
Subjective:     Ofelia Magana is a 64 y.o. female who presents for follow up of hypertension. Seen 2 weeks ago and blood pressure was elevated but was not feeling well. No med changes were made. Diet and Lifestyle: generally follows a low sodium diet, exercises regularly 0 walking every day  Home BP Monitoring: is borderline controlled at home, ranging 140-160's/upper 80's    Cardiovascular ROS: taking medications as instructed, no medication side effects noted, no TIA's, no chest pain on exertion, no dyspnea on exertion, no swelling of ankles, no orthostatic dizziness or lightheadedness, no palpitations. New concerns:   Taking allergy meds which is helping although nose still feels junky  Stomach has improved with pepcid. F/u with Misty Aguillon on Monday  Saw Dr. Beverly Martinez earlier this week. Nodules essentially the same although 1 had increased from 1cm to 1.5cm. Current Outpatient Medications   Medication Sig Dispense Refill    amLODIPine (NORVASC) 5 mg tablet Take 1 Tab by mouth daily. 30 Tab 3    baclofen (LIORESAL) 10 mg tablet TAKE A HALF TO 1 TABLET BY MOUTH EVERY 12 HOURS AS NEEDED      levocetirizine (Xyzal) 5 mg tablet Take 5 mg by mouth daily as needed for Allergies.  ALPRAZolam (XANAX) 0.5 mg tablet Take 0.5-1 Tabs by mouth two (2) times daily as needed for Anxiety. Max Daily Amount: 1 mg. 30 Tab 1    lisinopriL (PRINIVIL, ZESTRIL) 40 mg tablet TAKE 1 TABLET BY MOUTH EVERY DAY 90 Tab 3    omega 3-dha-epa-fish oil (Fish Oil) 100-160-1,000 mg cap Take  by mouth.  multivitamin (ONE A DAY) tablet Take 1 Tab by mouth daily.  carboxymethylcellulose sodium (REFRESH LIQUIGEL) 1 % dlgl ophthalmic solution 1 Drop as needed.  cholecalciferol, vitamin D3, (VITAMIN D3 PO) Take  by mouth.           Lab Results   Component Value Date/Time    GFR est non-AA 82 07/17/2020 12:00 AM    GFRNA, POC >60 03/29/2011 07:50 AM    GFR est AA 95 07/17/2020 12:00 AM    GFRAA, POC >60 03/29/2011 07:50 AM Creatinine 0.81 07/17/2020 12:00 AM    Creatinine (POC) 0.6 03/29/2011 07:50 AM    BUN 16 07/17/2020 12:00 AM    Sodium 142 07/17/2020 12:00 AM    Potassium 4.1 07/17/2020 12:00 AM    Chloride 104 07/17/2020 12:00 AM    CO2 25 07/17/2020 12:00 AM    PTH, Intact 34 12/19/2016 09:57 AM        Review of Systems, additional:  Pertinent items are noted in HPI. Objective:     Visit Vitals  BP (!) 150/88   Pulse 71   Temp 97.9 °F (36.6 °C) (Temporal)   Resp 16   Ht 5' 6\" (1.676 m)   Wt 167 lb 3.2 oz (75.8 kg)   SpO2 99%   BMI 26.99 kg/m²     Appearance: alert, well appearing, and in no distress and oriented to person, place, and time. General exam:   . NECK: supple, no lad, no bruit, no tm  LUNGS: cta bilat  CV rrr, no m/g/r  ABD: soft, nt, nd, nabs  EXT: no c/c/e    Assessment/Plan:     hypertension borderline controlled. the following changes are made - add amlodipine 5mg qd. Orders Placed This Encounter    amLODIPine (NORVASC) 5 mg tablet     Follow-up and Dispositions    · Return in about 4 weeks (around 6/11/2021) for htn.

## 2021-06-18 ENCOUNTER — OFFICE VISIT (OUTPATIENT)
Dept: INTERNAL MEDICINE CLINIC | Age: 56
End: 2021-06-18
Payer: COMMERCIAL

## 2021-06-18 VITALS
HEART RATE: 75 BPM | WEIGHT: 168 LBS | BODY MASS INDEX: 27 KG/M2 | HEIGHT: 66 IN | OXYGEN SATURATION: 99 % | DIASTOLIC BLOOD PRESSURE: 83 MMHG | RESPIRATION RATE: 20 BRPM | SYSTOLIC BLOOD PRESSURE: 140 MMHG | TEMPERATURE: 98.1 F

## 2021-06-18 DIAGNOSIS — R73.9 ELEVATED BLOOD SUGAR: ICD-10-CM

## 2021-06-18 DIAGNOSIS — E55.9 VITAMIN D DEFICIENCY: ICD-10-CM

## 2021-06-18 DIAGNOSIS — I10 ESSENTIAL HYPERTENSION: Primary | ICD-10-CM

## 2021-06-18 DIAGNOSIS — K21.9 GASTROESOPHAGEAL REFLUX DISEASE, UNSPECIFIED WHETHER ESOPHAGITIS PRESENT: ICD-10-CM

## 2021-06-18 PROCEDURE — 99214 OFFICE O/P EST MOD 30 MIN: CPT | Performed by: INTERNAL MEDICINE

## 2021-06-18 RX ORDER — MINERAL OIL
180 ENEMA (ML) RECTAL DAILY
COMMUNITY

## 2021-06-18 RX ORDER — FLUTICASONE FUROATE 27.5 UG/1
2 SPRAY, METERED NASAL DAILY
COMMUNITY

## 2021-06-18 RX ORDER — CHLORTHALIDONE 25 MG/1
12.5 TABLET ORAL DAILY
Qty: 15 TABLET | Refills: 3 | Status: SHIPPED | OUTPATIENT
Start: 2021-06-18 | End: 2021-09-20

## 2021-06-18 RX ORDER — PANTOPRAZOLE SODIUM 40 MG/1
TABLET, DELAYED RELEASE ORAL
COMMUNITY
Start: 2021-05-21 | End: 2021-07-20 | Stop reason: ALTCHOICE

## 2021-06-18 NOTE — PROGRESS NOTES
Subjective:     Joshua Mary is a 64 y.o. female who presents for follow up of hypertension. Last visit added Amlodipine 5mg for BP control. Diet and Lifestyle: generally follows a low sodium diet, exercises regularly  Home BP Monitoring: is well controlled at home, ranging 140's/80's - previous 150/90s    Cardiovascular ROS: taking medications as instructed, no medication side effects noted, no TIA's, no chest pain on exertion, no dyspnea on exertion, noting swelling of ankles. New concerns:   Did not tolerate diuretic as caused weakness. .   Had upper endoscopy showing ulcerations. Placed on protonix x 30 days. Stops on Monday. Saw. Dr. Shane Mattson. Had allergy testing showing 3 environmental allergies. Currently on Flonase Sensimist, allegra as needed. On Ceravee for itching skin. Current Outpatient Medications   Medication Sig Dispense Refill    pantoprazole (PROTONIX) 40 mg tablet       fexofenadine (ALLEGRA) 180 mg tablet Take 180 mg by mouth daily.  fluticasone (Flonase Sensimist) 27.5 mcg/actuation nasal spray 2 Sprays by Nasal route daily.  amLODIPine (NORVASC) 5 mg tablet Take 1 Tab by mouth daily. 30 Tab 3    ALPRAZolam (XANAX) 0.5 mg tablet Take 0.5-1 Tabs by mouth two (2) times daily as needed for Anxiety. Max Daily Amount: 1 mg. 30 Tab 1    lisinopriL (PRINIVIL, ZESTRIL) 40 mg tablet TAKE 1 TABLET BY MOUTH EVERY DAY 90 Tab 3    omega 3-dha-epa-fish oil (Fish Oil) 100-160-1,000 mg cap Take  by mouth.  multivitamin (ONE A DAY) tablet Take 1 Tab by mouth daily.  carboxymethylcellulose sodium (REFRESH LIQUIGEL) 1 % dlgl ophthalmic solution 1 Drop as needed.  cholecalciferol, vitamin D3, (VITAMIN D3 PO) Take  by mouth.       baclofen (LIORESAL) 10 mg tablet TAKE A HALF TO 1 TABLET BY MOUTH EVERY 12 HOURS AS NEEDED (Patient not taking: Reported on 6/18/2021)          Lab Results   Component Value Date/Time    GFR est non-AA 82 07/17/2020 12:00 AM    GFRNA, POC >60 03/29/2011 07:50 AM    GFR est AA 95 07/17/2020 12:00 AM    GFRAA, POC >60 03/29/2011 07:50 AM    Creatinine 0.81 07/17/2020 12:00 AM    Creatinine (POC) 0.6 03/29/2011 07:50 AM    BUN 16 07/17/2020 12:00 AM    Sodium 142 07/17/2020 12:00 AM    Potassium 4.1 07/17/2020 12:00 AM    Chloride 104 07/17/2020 12:00 AM    CO2 25 07/17/2020 12:00 AM    PTH, Intact 34 12/19/2016 09:57 AM        Review of Systems, additional:  Pertinent items are noted in HPI. Objective:     Visit Vitals  BP (!) 140/83   Pulse 75   Temp 98.1 °F (36.7 °C) (Temporal)   Resp 20   Ht 5' 6\" (1.676 m)   Wt 168 lb (76.2 kg)   SpO2 99%   BMI 27.12 kg/m²     Appearance: alert, well appearing, and in no distress and oriented to person, place, and time. General exam:   . NECK: supple, no lad, no bruit, no tm  LUNGS: cta bilat  CV rrr, no m/g/r  ABD: soft, nt, nd, nabs  EXT: no c/c, trace le edema. Assessment/Plan:     hypertension not at goal.  Trial chlorthalidone 12.5mg every day. .  Continue Amlodipine and lisinopril. Mild swelling with Amlodipine. Check labs    Myalgias/stiffness - ? Menopause, ? Vit D def  Check labs  Continue exercise    Elevated BS - repeat A1C  .   gerd with gastric ulcer - protonix per GI.     Orders Placed This Encounter    METABOLIC PANEL, COMPREHENSIVE    LIPID PANEL    HEMOGLOBIN A1C WITH EAG    TSH 3RD GENERATION    VITAMIN D, 25 HYDROXY    pantoprazole (PROTONIX) 40 mg tablet    fexofenadine (ALLEGRA) 180 mg tablet    fluticasone (Flonase Sensimist) 27.5 mcg/actuation nasal spray    chlorthalidone (HYGROTON) 25 mg tablet

## 2021-06-26 LAB
25(OH)D3+25(OH)D2 SERPL-MCNC: 47 NG/ML (ref 30–100)
ALBUMIN SERPL-MCNC: 4.9 G/DL (ref 3.8–4.9)
ALBUMIN/GLOB SERPL: 2 {RATIO} (ref 1.2–2.2)
ALP SERPL-CCNC: 86 IU/L (ref 48–121)
ALT SERPL-CCNC: 19 IU/L (ref 0–32)
AST SERPL-CCNC: 24 IU/L (ref 0–40)
BILIRUB SERPL-MCNC: 0.5 MG/DL (ref 0–1.2)
BUN SERPL-MCNC: 24 MG/DL (ref 6–24)
BUN/CREAT SERPL: 28 (ref 9–23)
CALCIUM SERPL-MCNC: 10.3 MG/DL (ref 8.7–10.2)
CHLORIDE SERPL-SCNC: 99 MMOL/L (ref 96–106)
CHOLEST SERPL-MCNC: 244 MG/DL (ref 100–199)
CO2 SERPL-SCNC: 27 MMOL/L (ref 20–29)
CREAT SERPL-MCNC: 0.86 MG/DL (ref 0.57–1)
EST. AVERAGE GLUCOSE BLD GHB EST-MCNC: 131 MG/DL
GLOBULIN SER CALC-MCNC: 2.5 G/DL (ref 1.5–4.5)
GLUCOSE SERPL-MCNC: 117 MG/DL (ref 65–99)
HBA1C MFR BLD: 6.2 % (ref 4.8–5.6)
HDLC SERPL-MCNC: 101 MG/DL
IMP & REVIEW OF LAB RESULTS: NORMAL
LDLC SERPL CALC-MCNC: 135 MG/DL (ref 0–99)
POTASSIUM SERPL-SCNC: 4.6 MMOL/L (ref 3.5–5.2)
PROT SERPL-MCNC: 7.4 G/DL (ref 6–8.5)
SODIUM SERPL-SCNC: 141 MMOL/L (ref 134–144)
TRIGL SERPL-MCNC: 52 MG/DL (ref 0–149)
TSH SERPL DL<=0.005 MIU/L-ACNC: 2.83 UIU/ML (ref 0.45–4.5)
VLDLC SERPL CALC-MCNC: 8 MG/DL (ref 5–40)

## 2021-07-19 NOTE — PROGRESS NOTES
Subjective:   64 y.o. female for Well Woman Check. Her gyne and breast care is done elsewhere by her Ob-Gyne physician. Patient Active Problem List    Diagnosis Date Noted    Low back pain 05/29/2018    Right sided sciatica 05/29/2018    Status post lumbar spinal fusion 05/29/2018    Cervicalgia 01/12/2018    Degenerative disc disease, cervical 01/12/2018    Perimenopausal disorder 10/30/2017    Cervical radiculitis 07/07/2017    Multinodular goiter 12/19/2016    Benign neoplasm of muscle 10/26/2016    Acute vaginitis 08/25/2016    Increased frequency of urination 10/23/2015    Nephrolithiasis 04/15/2015    HTN (hypertension) 12/22/2011    Allergic rhinitis 12/22/2011     Current Outpatient Medications   Medication Sig Dispense Refill    fexofenadine (ALLEGRA) 180 mg tablet Take 180 mg by mouth daily.  fluticasone (Flonase Sensimist) 27.5 mcg/actuation nasal spray 2 Sprays by Nasal route daily.  chlorthalidone (HYGROTON) 25 mg tablet Take 0.5 Tablets by mouth daily. 15 Tablet 3    amLODIPine (NORVASC) 5 mg tablet Take 1 Tab by mouth daily. 30 Tab 3    baclofen (LIORESAL) 10 mg tablet TAKE A HALF TO 1 TABLET BY MOUTH EVERY 12 HOURS AS NEEDED      ALPRAZolam (XANAX) 0.5 mg tablet Take 0.5-1 Tabs by mouth two (2) times daily as needed for Anxiety. Max Daily Amount: 1 mg. 30 Tab 1    lisinopriL (PRINIVIL, ZESTRIL) 40 mg tablet TAKE 1 TABLET BY MOUTH EVERY DAY 90 Tab 3    omega 3-dha-epa-fish oil (Fish Oil) 100-160-1,000 mg cap Take  by mouth.  multivitamin (ONE A DAY) tablet Take 1 Tab by mouth daily.  carboxymethylcellulose sodium (REFRESH LIQUIGEL) 1 % dlgl ophthalmic solution 1 Drop as needed.  cholecalciferol, vitamin D3, (VITAMIN D3 PO) Take  by mouth.        Allergies   Allergen Reactions    Naprosyn [Naproxen] Other (comments)     GI distress and loose stools    Hydrochlorothiazide Other (comments)     Lightheaded/dizzy    Macrobid [Nitrofurantoin Monohyd/M-Cryst] Nausea Only    Sulfa (Sulfonamide Antibiotics) Unknown (comments)     Past Medical History:   Diagnosis Date    Anxiety     Calculus of kidney 2014    seen on ultrasound - no sxs ever    Environmental allergies     GERD (gastroesophageal reflux disease)     hiatal hernia    Hypertension      Past Surgical History:   Procedure Laterality Date    HX COLONOSCOPY  12/2011    Eran, f/u 10 years    NEUROLOGICAL PROCEDURE UNLISTED  2001    lumbar hodge., redo w/L5-S1 fusion (11/02-Ken)     Family History   Problem Relation Age of Onset    Cancer Father         prostate    Hypertension Father     Diabetes Father      Social History     Tobacco Use    Smoking status: Never Smoker    Smokeless tobacco: Never Used   Substance Use Topics    Alcohol use: No     Alcohol/week: 0.0 standard drinks        Lab Results   Component Value Date/Time    WBC 5.2 07/17/2020 12:00 AM    HGB 12.5 07/17/2020 12:00 AM    HCT 37.7 07/17/2020 12:00 AM    PLATELET 827 51/95/8586 12:00 AM    MCV 88 07/17/2020 12:00 AM     Lab Results   Component Value Date/Time    Hemoglobin A1c 6.2 (H) 06/25/2021 09:18 AM    Hemoglobin A1c 6.2 (H) 07/02/2012 07:31 AM    Hemoglobin A1c, External 0.0 04/20/2021 12:00 AM    Glucose 117 (H) 06/25/2021 09:18 AM    LDL, calculated 135 (H) 06/25/2021 09:18 AM    LDL, calculated 123 (H) 07/17/2020 12:00 AM    Creatinine (POC) 0.6 03/29/2011 07:50 AM    Creatinine 0.86 06/25/2021 09:18 AM      Lab Results   Component Value Date/Time    Cholesterol, total 244 (H) 06/25/2021 09:18 AM    HDL Cholesterol 101 06/25/2021 09:18 AM    LDL, calculated 135 (H) 06/25/2021 09:18 AM    LDL, calculated 123 (H) 07/17/2020 12:00 AM    LDL-C, External 0 04/20/2021 12:00 AM    Triglyceride 52 06/25/2021 09:18 AM     Lab Results   Component Value Date/Time    ALT (SGPT) 19 06/25/2021 09:18 AM    Alk.  phosphatase 86 06/25/2021 09:18 AM    Bilirubin, total 0.5 06/25/2021 09:18 AM    Albumin 4.9 06/25/2021 09:18 AM    Protein, total 7.4 06/25/2021 09:18 AM    PLATELET 290 44/91/4603 12:00 AM     Lab Results   Component Value Date/Time    GFR est non-AA 76 06/25/2021 09:18 AM    GFRNA, POC >60 03/29/2011 07:50 AM    GFR est AA 87 06/25/2021 09:18 AM    GFRAA, POC >60 03/29/2011 07:50 AM    Creatinine 0.86 06/25/2021 09:18 AM    Creatinine (POC) 0.6 03/29/2011 07:50 AM    BUN 24 06/25/2021 09:18 AM    Sodium 141 06/25/2021 09:18 AM    Potassium 4.6 06/25/2021 09:18 AM    Chloride 99 06/25/2021 09:18 AM    CO2 27 06/25/2021 09:18 AM    PTH, Intact 34 12/19/2016 09:57 AM     Lab Results   Component Value Date/Time    TSH 2.830 06/25/2021 09:18 AM         Specific concerns today:   Recent increase in BP. Seen approx 1 month ago and Chlorthalidone 12.5mg added to her Amlodipine and Lisinopril. [facility] last week BP was 118/74. Ankles are no longer swelling. Doesn't feel woozy anymore. Blood sugar and cholesterol elevated. A1C 6.2% - same as in 2012. Trying to walk more, eating better. Meal prepping. Feels stress in the last year contributed. Walking with friends, meditation. Try on to do more self care. Off protonix and stomach is doing well. Review of Systems  Constitutional: negative  Eyes: negative  Ears, nose, mouth, throat, and face: negative  Respiratory: negative  Cardiovascular: negative  Gastrointestinal: negative  Genitourinary:negative  Integument/breast: negative  Hematologic/lymphatic: negative  Musculoskeletal:negative  Neurological: negative  Behavioral/Psych: negative  Endocrine: negative  Allergic/Immunologic: negative    Objective:   Blood pressure 109/73, pulse 83, temperature 98 °F (36.7 °C), temperature source Temporal, resp. rate 18, height 5' 6\" (1.676 m), weight 164 lb 12.8 oz (74.8 kg), SpO2 99 %.    Physical Examination:   General appearance - alert, well appearing, and in no distress and oriented to person, place, and time  Mental status - alert, oriented to person, place, and time, normal mood, behavior, speech, dress, motor activity, and thought processes  Eyes - pupils equal and reactive, extraocular eye movements intact  Ears - bilateral TM's and external ear canals normal  Nose - normal and patent, no erythema, discharge or polyps  Mouth - mucous membranes moist, pharynx normal without lesions  Neck - supple, no significant adenopathy, thyroid exam: thyroid is normal in size without nodules or tenderness  Lymphatics - no palpable lymphadenopathy, no hepatosplenomegaly  Chest - clear to auscultation, no wheezes, rales or rhonchi, symmetric air entry  Heart - normal rate, regular rhythm, normal S1, S2, no murmurs, rubs, clicks or gallops  Abdomen - soft, nontender, nondistended, no masses or organomegaly  Breasts - breasts appear normal, no suspicious masses, no skin or nipple changes or axillary nodes  Back exam - full range of motion, no tenderness, palpable spasm or pain on motion  Neurological - alert, oriented, normal speech, no focal findings or movement disorder noted  Musculoskeletal - no joint tenderness, deformity or swelling  Extremities - peripheral pulses normal, no pedal edema, no clubbing or cyanosis  Skin - normal coloration and turgor, no rashes, no suspicious skin lesions noted     Assessment/Plan:   64yo female here for PE  htn - much improved. cntinue same meds  HLD _ elevated. Working on diet and exercise  Elevated BS/prediabetes - elevated. workin on diet an exercise. Repeat labs in 3 months.  - Utd mammogram and pap. Will obtain results. Will receive Shingrix through local pharmacy.     call if any problems  Orders Placed This Encounter    METABOLIC PANEL, COMPREHENSIVE    LIPID PANEL    HEMOGLOBIN A1C WITH EAG

## 2021-07-20 ENCOUNTER — OFFICE VISIT (OUTPATIENT)
Dept: INTERNAL MEDICINE CLINIC | Age: 56
End: 2021-07-20
Payer: COMMERCIAL

## 2021-07-20 VITALS
OXYGEN SATURATION: 99 % | SYSTOLIC BLOOD PRESSURE: 109 MMHG | HEART RATE: 83 BPM | RESPIRATION RATE: 18 BRPM | TEMPERATURE: 98 F | HEIGHT: 66 IN | BODY MASS INDEX: 26.48 KG/M2 | WEIGHT: 164.8 LBS | DIASTOLIC BLOOD PRESSURE: 73 MMHG

## 2021-07-20 DIAGNOSIS — I10 ESSENTIAL HYPERTENSION: ICD-10-CM

## 2021-07-20 DIAGNOSIS — E78.00 PURE HYPERCHOLESTEROLEMIA: ICD-10-CM

## 2021-07-20 DIAGNOSIS — Z00.00 ROUTINE GENERAL MEDICAL EXAMINATION AT A HEALTH CARE FACILITY: Primary | ICD-10-CM

## 2021-07-20 DIAGNOSIS — R73.9 ELEVATED BLOOD SUGAR: ICD-10-CM

## 2021-07-20 PROCEDURE — 99396 PREV VISIT EST AGE 40-64: CPT | Performed by: INTERNAL MEDICINE

## 2021-07-20 NOTE — PROGRESS NOTES
Patient has been informed of any other discussion outside the parameters of the physical could result in other charges including a co pay, patient verbalized understanding. 1. Have you been to the ER, urgent care clinic since your last visit? Hospitalized since your last visit? No    2. Have you seen or consulted any other health care providers outside of the [address] [place] [address] since your last visit? Include any pap smears or colon screening.  No   Chief Complaint   Patient presents with    Complete Physical

## 2021-08-09 RX ORDER — LISINOPRIL 40 MG/1
TABLET ORAL
Qty: 90 TABLET | Refills: 3 | Status: SHIPPED | OUTPATIENT
Start: 2021-08-09 | End: 2022-07-25

## 2021-10-15 LAB
ALBUMIN SERPL-MCNC: 4.6 G/DL (ref 3.8–4.9)
ALBUMIN/GLOB SERPL: 1.8 {RATIO} (ref 1.2–2.2)
ALP SERPL-CCNC: 89 IU/L (ref 44–121)
ALT SERPL-CCNC: 18 IU/L (ref 0–32)
AST SERPL-CCNC: 19 IU/L (ref 0–40)
BILIRUB SERPL-MCNC: 0.4 MG/DL (ref 0–1.2)
BUN SERPL-MCNC: 19 MG/DL (ref 6–24)
BUN/CREAT SERPL: 22 (ref 9–23)
CALCIUM SERPL-MCNC: 9.7 MG/DL (ref 8.7–10.2)
CHLORIDE SERPL-SCNC: 101 MMOL/L (ref 96–106)
CHOLEST SERPL-MCNC: 232 MG/DL (ref 100–199)
CO2 SERPL-SCNC: 25 MMOL/L (ref 20–29)
CREAT SERPL-MCNC: 0.87 MG/DL (ref 0.57–1)
EST. AVERAGE GLUCOSE BLD GHB EST-MCNC: 140 MG/DL
GLOBULIN SER CALC-MCNC: 2.5 G/DL (ref 1.5–4.5)
GLUCOSE SERPL-MCNC: 111 MG/DL (ref 65–99)
HBA1C MFR BLD: 6.5 % (ref 4.8–5.6)
HDLC SERPL-MCNC: 94 MG/DL
IMP & REVIEW OF LAB RESULTS: NORMAL
LDLC SERPL CALC-MCNC: 129 MG/DL (ref 0–99)
POTASSIUM SERPL-SCNC: 4.5 MMOL/L (ref 3.5–5.2)
PROT SERPL-MCNC: 7.1 G/DL (ref 6–8.5)
SODIUM SERPL-SCNC: 140 MMOL/L (ref 134–144)
TRIGL SERPL-MCNC: 51 MG/DL (ref 0–149)
VLDLC SERPL CALC-MCNC: 9 MG/DL (ref 5–40)

## 2021-10-20 ENCOUNTER — OFFICE VISIT (OUTPATIENT)
Dept: INTERNAL MEDICINE CLINIC | Age: 56
End: 2021-10-20
Payer: COMMERCIAL

## 2021-10-20 VITALS
BODY MASS INDEX: 26.65 KG/M2 | TEMPERATURE: 98.2 F | HEIGHT: 66 IN | RESPIRATION RATE: 16 BRPM | HEART RATE: 79 BPM | WEIGHT: 165.8 LBS | DIASTOLIC BLOOD PRESSURE: 74 MMHG | SYSTOLIC BLOOD PRESSURE: 124 MMHG

## 2021-10-20 DIAGNOSIS — R73.9 ELEVATED BLOOD SUGAR: ICD-10-CM

## 2021-10-20 DIAGNOSIS — I10 ESSENTIAL HYPERTENSION: Primary | ICD-10-CM

## 2021-10-20 DIAGNOSIS — E78.00 PURE HYPERCHOLESTEROLEMIA: ICD-10-CM

## 2021-10-20 PROCEDURE — 99214 OFFICE O/P EST MOD 30 MIN: CPT | Performed by: INTERNAL MEDICINE

## 2021-10-20 NOTE — PROGRESS NOTES
Subjective:     Hilary Gastelum is a 64 y.o. female who presents for follow up of hypertension and elevated blood sugar. Diet and Lifestyle: not attempting to follow a low fat, low cholesterol diet, generally follows a low sodium diet, follows a diabetic diet regularly, exercises regularly  Home BP Monitoring: is well controlled at home, ranging 120's/70's    Cardiovascular ROS: taking medications as instructed, no medication side effects noted, no TIA's, no chest pain on exertion, no dyspnea on exertion, no swelling of ankles, no orthostatic dizziness or lightheadedness, no palpitations. New concerns:   Elevated blood sugar - A1C has increased from 6.2 to 6.5. Changed to oatmeal and cheerios to improve her cholesterol. Avoids bread. Occ potato or rice. Avoiding sugary foods. Cut out chips. Exercising regularly - walking every day, just started yoga class. No changes in thirst or urination, or numbness in feet. Mild increased blurred vision but eye exam scheduled for December. .     Current Outpatient Medications   Medication Sig Dispense Refill    chlorthalidone (HYGROTON) 25 mg tablet TAKE 1/2 TABLET BY MOUTH EVERY DAY 45 Tablet 3    lisinopriL (PRINIVIL, ZESTRIL) 40 mg tablet TAKE 1 TABLET BY MOUTH EVERY DAY 90 Tablet 3    amLODIPine (NORVASC) 5 mg tablet TAKE 1 TABLET BY MOUTH EVERY DAY 90 Tablet 3    fexofenadine (ALLEGRA) 180 mg tablet Take 180 mg by mouth daily.  fluticasone (Flonase Sensimist) 27.5 mcg/actuation nasal spray 2 Sprays by Nasal route daily.  ALPRAZolam (XANAX) 0.5 mg tablet Take 0.5-1 Tabs by mouth two (2) times daily as needed for Anxiety. Max Daily Amount: 1 mg. 30 Tab 1    omega 3-dha-epa-fish oil (Fish Oil) 100-160-1,000 mg cap Take  by mouth.  multivitamin (ONE A DAY) tablet Take 1 Tab by mouth daily.  cholecalciferol, vitamin D3, (VITAMIN D3 PO) Take  by mouth.       baclofen (LIORESAL) 10 mg tablet TAKE A HALF TO 1 TABLET BY MOUTH EVERY 12 HOURS AS NEEDED (Patient not taking: Reported on 10/20/2021)      carboxymethylcellulose sodium (REFRESH LIQUIGEL) 1 % dlgl ophthalmic solution 1 Drop as needed. (Patient not taking: Reported on 10/20/2021)          Lab Results   Component Value Date/Time    Hemoglobin A1c 6.5 (H) 10/14/2021 07:48 AM    Hemoglobin A1c 6.2 (H) 06/25/2021 09:18 AM    Hemoglobin A1c 6.2 (H) 07/02/2012 07:31 AM    Hemoglobin A1c, External 0.0 04/20/2021 12:00 AM    Glucose 111 (H) 10/14/2021 07:48 AM    LDL, calculated 129 (H) 10/14/2021 07:48 AM    LDL, calculated 123 (H) 07/17/2020 12:00 AM    Creatinine (POC) 0.6 03/29/2011 07:50 AM    Creatinine 0.87 10/14/2021 07:48 AM      Lab Results   Component Value Date/Time    Cholesterol, total 232 (H) 10/14/2021 07:48 AM    HDL Cholesterol 94 10/14/2021 07:48 AM    LDL, calculated 129 (H) 10/14/2021 07:48 AM    LDL, calculated 123 (H) 07/17/2020 12:00 AM    LDL-C, External 0 04/20/2021 12:00 AM    Triglyceride 51 10/14/2021 07:48 AM     Lab Results   Component Value Date/Time    ALT (SGPT) 18 10/14/2021 07:48 AM    Alk. phosphatase 89 10/14/2021 07:48 AM    Bilirubin, total 0.4 10/14/2021 07:48 AM    Albumin 4.6 10/14/2021 07:48 AM    Protein, total 7.1 10/14/2021 07:48 AM    PLATELET 803 35/10/8455 12:00 AM     Lab Results   Component Value Date/Time    GFR est non-AA 75 10/14/2021 07:48 AM    GFRNA, POC >60 03/29/2011 07:50 AM    GFR est AA 86 10/14/2021 07:48 AM    GFRAA, POC >60 03/29/2011 07:50 AM    Creatinine 0.87 10/14/2021 07:48 AM    Creatinine (POC) 0.6 03/29/2011 07:50 AM    BUN 19 10/14/2021 07:48 AM    Sodium 140 10/14/2021 07:48 AM    Potassium 4.5 10/14/2021 07:48 AM    Chloride 101 10/14/2021 07:48 AM    CO2 25 10/14/2021 07:48 AM    PTH, Intact 34 12/19/2016 09:57 AM     Lab Results   Component Value Date/Time    TSH 2.830 06/25/2021 09:18 AM         Review of Systems, additional:  Pertinent items are noted in HPI.     Objective:     Visit Vitals  /74   Pulse 79   Temp 98.2 °F (36.8 °C) (Temporal)   Resp 16   Ht 5' 6\" (1.676 m)   Wt 165 lb 12.8 oz (75.2 kg)   BMI 26.76 kg/m²     Appearance: alert, well appearing, and in no distress and oriented to person, place, and time. General exam:   . NECK: supple, no lad, no bruit, no tm  LUNGS: cta bilat  CV rrr, no m/g/r  ABD: soft, nt, nd, nabs  EXT: no c/c/e    Assessment/Plan:     hypertension well controlled . current treatment plan is effective, no change in therapy. Elevated BS - A1C has increased. Continue to work on diet and exercise. Referred to DM ed    hyperlipidemia - improved with diet and exercise  Hold medication at this time. Orders Placed This Encounter    METABOLIC PANEL, COMPREHENSIVE    LIPID PANEL    HEMOGLOBIN A1C WITH EAG    REFERRAL TO DIABETIC EDUCATION     Follow-up and Dispositions    · Return in about 3 months (around 1/20/2022) for htn, elevated blood sugar.

## 2021-11-04 ENCOUNTER — CLINICAL SUPPORT (OUTPATIENT)
Dept: DIABETES SERVICES | Age: 56
End: 2021-11-04
Payer: COMMERCIAL

## 2021-11-04 DIAGNOSIS — E11.9 TYPE 2 DIABETES MELLITUS WITHOUT COMPLICATION, UNSPECIFIED WHETHER LONG TERM INSULIN USE (HCC): Primary | ICD-10-CM

## 2021-11-04 PROCEDURE — G0108 DIAB MANAGE TRN  PER INDIV: HCPCS | Performed by: DIETITIAN, REGISTERED

## 2021-11-04 NOTE — PROGRESS NOTES
[employer] [facility] for Diabetes [facility]  Diabetes Self-Management Education & Support Program  Pre-program Assessment    Reason for Referral: elevated BG  Referral Source: Eileen Shetty MD  Services requested: DSMES    ASSESSMENT    From my perspective, the participant would benefit from [facility] specifically related to Reducing risks, Healthy eating, Physical activity, Taking medications, Healthy coping and Problem solving. Will adapt DSMES program to build on participant's strengths in skills score, strengths in confidence score and strengths in preparedness score as noted in the Diabetes Skills, Confidence, and Preparedness Index. During the program, we will focus on providing DSMES that specifically addresses participant's interest in Reducing risks, Healthy eating, Physical activity and Problem solving, as shown by their reported readiness to change. The participant would be best served by attending weekly individual sessions due to her work schedule. Diabetes Self-Management Education Follow-up Visit: 11/22/21       Clinical Presentation  Kvng Robison is a 64 y.o.  female referred for diabetes self-management education. Participant has Type 2 DM not on insulin for <1 year. Family history positive for diabetes. Patient reports not receiving DSMES services in the past. Most recent A1c value:   Lab Results   Component Value Date/Time    Hemoglobin A1c 6.5 (H) 10/14/2021 07:48 AM    Hemoglobin A1c, External 0.0 04/20/2021 12:00 AM       Diabetes-related medical history:  Acute complications, Neurological complications, Microvascular disease, Macrovascular disease  Other associated conditions         Diabetes-related medications:  Current dosing:   Key Antihyperglycemic Medications     Patient is on no antihyperglycemic meds.           Blood Pressure Management  Key ACE/ARB Medications             lisinopriL (PRINIVIL, ZESTRIL) 40 mg tablet TAKE 1 TABLET BY MOUTH EVERY DAY          Lipid Management  Key Antihyperlipidemia Meds             omega 3-dha-epa-fish oil (Fish Oil) 100-160-1,000 mg cap Take  by mouth. Clot Prevention  Key Anti-Platelet Anticoagulant Meds     The patient is on no antiplatelet meds or anticoagulants. Learning Assessment  Learning objectives Educator assessment (11/4/2021)   Diabetes Disease Process  The participant can   A) describe diabetes in basic terms;   B) state the type of diabetes they have; &   C) state accepted blood glucose targets. Healthy Eating  The participant can   A) identify carbohydrate foods; &   B) accurately read food labels. Being Active  The participant can  A) state the benefits of physical activity;  B) report their current PA practices;  C) identify PA they would consider incorporating in their lives; &  D) develop an implementation plan. Monitoring  The participant can  A) operate their blood glucose meter; &  B) describe how they log their blood glucoses to share with their provider. Taking Medications  The participant can  A) name their diabetes medications;  B) state the purpose and dose;  C) note side effects; &  D) describe proper storage, disposal & transport (if appropriate). Healthy Coping  The participant can    A) describe their response to diabetes diagnosis; B) describe their specific coping mechanisms;  C) identify supportive people and/or other resources that positively support their diabetes self-care and health. Reducing Risks  The participant can describe the preventive measures used by providers to promote health and prevent diabetes complications. Problem Solving  The participant can   A) identify signs, symptoms & treatment of hypoglycemia;   B) identify signs, symptoms & treatment of hyperglycemia;  C) describe their sick day plan; &  D) identify BG patterns to discuss with their provider. No  No  No        Yes  Yes        Yes  Yes  Yes  Yes        No  No          No  No  No  No      Yes  No  No        No          No  No  No  No     Characteristics to Learning   Barriers to Learning   [] Cognitive loss  [] Mental retardation   [] Intellectual delay/cognitive impairment  [] Psychiatric disorder  [] Visually impaired  [] Hearing loss                 [] Low literacy (difficulty with written text)  [] Low numeracy (difficulty with mathematical information  [] Low health literacy (difficulty with understanding health information & services  [] Language  [] Functional limitation  [] Pain   [] Financial  [] Transportation  [x] None   Favorite Ways to Learn   [] Lecture  [] Slides  [x] Reading [] Video-Internet  [] Cassettes/CDs/MP3's  [] Interactive Small Groups [] Other       Behavioral Assessment  Current self-care practices  Educator assessment (11/4/2021)   Healthy Eating  Current practices  24-hour Dietary Recall:  Breakfast: Cream of Wheat/Oatmeal 1 cup (every week day or snack at night if hungry) with blueberries, coconut oil, lorene seed, tumeric, walnuts. Cheerios with blueberries and oatmilk/almond milk , egg white omelette - mushroom, asparagus, onions with fruit (apple/blueberries)  Lunch: PB and Jelly on 100% ww and piece of fruit; chix salad (vegenaise,relish,celery, grapes occasionally) with apple was using purple potato chips Mily; Ground turkey burger and salad. Ezekel with avocado  Dinner: Grilled chicken/seafood/ Ground turkey meatloaf; brown rice/m' sweet potato/pasta- rare, asparagus, corn bread. Tacos and will always have legumes (beans/pea/lentils- will have2/7 days) Pizza - cauliflower crust w/ pesto crust vegan cheese. Snacks: nuts, yogurt Greek plain -blueberries, granola,lorene seeds; Beverages: water, plain coconut water with cranberry juice. No soda. Alcohol: none, avoids red meat, pork.      Would benefit from [facility] related to Healthy Eating: Yes      Eats a carbohydrate controlled diet: No      Stage of change: Action     GERD (knows her trigger foods and avoids) and hx of ulcer that has healed. Being Active  Current practices  How many days during the past week have you performed physical activity where your heart beats faster and your breathing is harder than normal for 30 minutes or more?  7 days    How many days in a typical week do you perform activity such as this?  7 days     Would benefit from [facility] related to Being Active: Yes, review benefits      Exercises 150 minutes/week: Yes      Stage of change: Maintenance     Monitoring  Current practices  Do you monitor your blood sugar? No    How often do you monitor? Never    What are the range of readings? Not testing at present    Do you know your last A1c measurement? Yes    Do you know the meaning of the A1c? Yes     Would benefit from [facility] related to Monitoring: Yes, will consider going forward      Uses BG readings to establish trends and understand BG patterns: No, per patient  shared that she does not need to test currently. Stage of change: Maintenance   Taking Medication  Current practices  Do you understand what your diabetes medications do? No    How often do you miss doses of your diabetes medications? Not taking diabetes medication    Can you afford your diabetes medications? No   Would benefit from [facility] related to Taking Medication: Yes, review for knowledge      Takes medications consistently to receive full benefit: No, not currently on medications      Stage of change: Preparation       Healthy Coping   Current state  Diabetes Skills, Confidence and Preparedness Index:   Total score: 5.6  Skills: 4.1  Confidence: 6.3  Preparedness: 6.6   Would benefit from DSMES related to Healthy Coping: Yes      Identifies specific people, organizations,etc, that actively support their diabetes self-care efforts: No      Stage of change: Action     Reducing Risks  Current state  Vaccines:  Influenza: Immunization History   Administered Date(s) Administered    Influenza Vaccine 11/01/2018    Influenza Vaccine (Quad) PF (>6 Mo Flulaval, Fluarix, and >3 Yrs Afluria, Fluzone 09003) 12/18/2018, 11/05/2020       Pneumococcal: There is no immunization history for the selected administration types on file for this patient. Hepatitis: There is no immunization history for the selected administration types on file for this patient. Examinations:  No Diabetic HM Topics for this patient     Dental exam: Last appointment was: 9/2021  Eye exam: 12/15/21  Foot exam: not done to date. Heart Protection:  BP Readings from Last 2 Encounters:   10/20/21 124/74   07/20/21 109/73        Lab Results   Component Value Date/Time    LDL, calculated 129 (H) 10/14/2021 07:48 AM    LDL, calculated 123 (H) 07/17/2020 12:00 AM        Kidney Protection:  No results found for: MCACR, MCA1, MCA2, MCA3, MCAU, MCAU2, MCALPOCT     Would benefit from [facility] related to Reducing Risks: Yes      Actively participates in decision-making with provider regarding secondary prevention:  Yes      Stage of change: Action   Problem Solving  Current state  Hypoglycemia Management:  What are signs and symptoms of hypoglycemia that you experience? hard time concentrating    How do you prevent hypoglycemia? Consistent meals/snack times    How do you treat hypoglycemia? Pt reported being unaware of how to treat hypoglycemia    Hyperglycemia Management:  What are signs and symptoms of hyperglycemia that you experience? Frequent urination    How can you prevent hyperglycemia? Focus on carbohydrate counting/meal planning, Maintain a healthy weight, Engage in regular physically active    Sick Day Management:  What do you do differently on sick days? Pt reported being unaware of self-management on sick days    Pattern Management:  Do you notice blood glucose patterns when you look at the readings in your meter or logbook?  No    How do you use the blood glucose readings from your meter or logbook? Pt reported being unaware of pattern management skills     Would benefit from [facility] related to Problem Solving: Yes      Articulates appropriate strategies to address hypoglycemia, hyperglycemia, sick day care and BG pattern: No      Stage of change: Preparation     Note: Content derived from the American Association of Diabetes Educators' Diabetes Education Curriculum: A Guide to Successful Self-Management (3rd edition)      Nicolette Ring RD, [facility] on 11/4/2021 at 10:29 AM    I have personally reviewed the health record, including provider notes, laboratory data and current medications before making these care and education recommendations. The time spent in this effort is included in the total time. Total minutes: 27    [id number] Adaptations for Telehealth:  Suzi Campbell was evaluated in person. Overall SCPI score: 5.6 Skills Score: 4.1  Low: Reducing Risks(Q5),Problem Solving(Q6) Confidence Score: 6.3  Low: Reducing Risks(Q3) Preparedness Score: 6.6  Low: Healthy Coping(Q3),Blood Sugar Monitoring(Q6)  Healthy Eating Score: 6.8  Low: Skills(Q1) Taking Medication Score: N/A Blood Sugar Monitoring Score: 5.0  Low: Skills(Q3),Skills(Q8) Reducing Risks Score: 3.7  Low: Skills(Q5)  Problem Solving Score: 5.0  Low: Skills(Q6) Healthy Coping Score: 6.3  Low: Skills(Q7),Preparedness(Q3) Being Active Score: 7.0  Low: Confidence(Q5),Preparedness(Q2)    Skills/Knowledge Questions  1. I know how to plan meals that have the best balance between carbohydrates, proteins and vegetables. 6  2. I know how my diabetes medications (pills, injectables and/or insulin) work in my body. 0  3. I know when to check my blood sugar if I want to see how my body responded to a meal. 4  4. I know when to check my blood sugars to determine if my medication or insulin doses are correct. 0  5.  I know what to do to prevent a low blood sugar when I exercise (either before, during, or after). 2  6. When I am sick, I know what to do differently with my diabetes management. 2  7. I know how stress can affect my diabetes management. 6  8. When I look at my blood sugars over a given week, I can explain what my blood sugar pattern is. 4  9. I know what my target levels are for A1c, blood pressure and cholesterol. 5  Confidence Questions  1. I am confident that I can plan balanced meals and snacks. 7  2. I am confident that I can manage my stress. 7  3. I am confident that I can prevent a low blood sugar during or after exercise. 4  4. I am confident that the next time I eat out, I will be able to choose foods that best keep my blood sugars in target. 7  5. I am confident I can include exercise into my schedule. 7  6. I am confident that I can use my daily blood sugars to adjust my diet, my activity, and/or my insulin. 6  7. When something out of my normal routine happens, I am confident that I can problem-solve and keep my diabetes on track. 6  Preparedness Questions  1. Within the next month, I will begin to eat more balanced meals and snacks. 7  2. Within the next month, I will choose an exercise activity and I will start fitting it into my schedule. 8  3. Within the next month, I will make a list of stress management options that work for me. 6  4. Within the next month, I will consistently plan ahead to prevent low blood sugars. 0  5. Within the next month, I will start adjusting my insulin doses on my own. 0  6. Within the next month, I will begin making changes to my diabetes management based on my daily blood sugars (eg - eating, activity and/or insulin). 6  7. Within the next month, I will begin making changes to my diabetes management to meet my overall goals (eg - eating, activity and/or insulin).

## 2021-11-22 ENCOUNTER — CLINICAL SUPPORT (OUTPATIENT)
Dept: DIABETES SERVICES | Age: 56
End: 2021-11-22
Payer: COMMERCIAL

## 2021-11-22 DIAGNOSIS — E11.9 TYPE 2 DIABETES MELLITUS WITHOUT COMPLICATION, UNSPECIFIED WHETHER LONG TERM INSULIN USE (HCC): Primary | ICD-10-CM

## 2021-11-22 PROCEDURE — G0108 DIAB MANAGE TRN  PER INDIV: HCPCS | Performed by: DIETITIAN, REGISTERED

## 2021-11-23 NOTE — PROGRESS NOTES
[facility] Program for Diabetes Health  Diabetes Self-Management Education & Support Program  Encounter note    SUMMARY  Diabetes self-care management training was completed related to Healthy eating. The participant will return on December 1 to continue DSMES related to Healthy eating. The participant did not identify SMART Goal(s): , and will practice knowledge and skills related to healthy eating to improve diabetes self-management. EVALUATION:  Smitha Bai returns to begin education - currently she is on no medications nor monitoring BG. We started with nutrition - see below for details of today's visit. Smitha Bai has a good base of her nutrition understanding, practices label reading for sodium and carbohydrates and sugar. We discussed benefits of reviewing for dietary fiber to help with BG control. Smitha Bai asked excellent questions related to nutrition throughout our visit today. We reviewed holiday eating tips to minimize effects on BG/A1c.    RECOMMENDATIONS:  1) continue to measure foods. 2) consider using good to excellent sources for dietary fiber to help guide you to complex carbohydrate choices. 3) too little carbohydrate at meals may contribute to increase snacking and kcal intakes - so may benefit from building carbohydrates to ~3-4 servings each meal.   4) if having dessert with holiday meal, allot it into meal count and walk afterwards. Next provider visit is scheduled for 1/21/22         DATE DSMES TOPIC EVALUATION   11/22/21 WHAT CAN I EAT?   a. General principles   b.  Determining a healthy weight   c. Nutritional terms & tools    Healthy Plate method    Carbohydrate Counting    Reading food labels    Free apps     The participant    Uses Healthy Plate principles in constructing meals Yes   Reads food labels in choosing acceptable foods Yes, needs review    The participant would benefit from review of label reading and then, starting carbohydrate counting using Choose Your Foods and nutrition labels to estimate her meals. Today we reviewed basic nutrients, general principles and carbohydrate portion using the Choose Your Foods handout, food models and measuring tools. Morelia Ocasio RD, [facility] on 11/23/2021 at 7:54 AM    I have personally reviewed the health record, including provider notes, laboratory data and current medications before making these care and education recommendations. The time spent in this effort is included in the total time. Total minutes: 61    [id number] for Telehealth:  Chaparro Cage was evaluated in person.

## 2021-12-01 ENCOUNTER — CLINICAL SUPPORT (OUTPATIENT)
Dept: DIABETES SERVICES | Age: 56
End: 2021-12-01
Payer: COMMERCIAL

## 2021-12-01 DIAGNOSIS — E11.9 TYPE 2 DIABETES MELLITUS WITHOUT COMPLICATION, UNSPECIFIED WHETHER LONG TERM INSULIN USE (HCC): Primary | ICD-10-CM

## 2021-12-01 PROCEDURE — G0108 DIAB MANAGE TRN  PER INDIV: HCPCS | Performed by: DIETITIAN, REGISTERED

## 2021-12-02 NOTE — PROGRESS NOTES
3 [facility] for Diabetes Health  Diabetes Self-Management Education & Support Program  Encounter note    SUMMARY  Diabetes self-care management training was completed related to Healthy eating. The participant will return on December 14 to continue DSMES related to Reducing risks. The participant did not identify SMART Goal(s):  and will practice knowledge and skills related to healthy eating to improve diabetes self-management. EVALUATION:  Armani Walls returns with many questions about meal planning and snacking. This was our focus topic for our visit today. She has good insight and questions that demonstrate understanding. RECOMMENDATIONS:  1) reflect on meals to see if you \"built the meal to include protein/fat/carbohydrates and non-starchy veg\". 2) if there were no carbohydrate foods, review what could be added to meal to meet 45-60g carbohydrate. 3) snacks can increase kcal intakes by 100-200 kcal daily which can make desired weight loss difficult. Next provider visit is scheduled for 1/21/22. DATE DSMES TOPIC EVALUATION     12/1/21 WHAT CAN I EAT?   a. General principles   b. Determining a healthy weight   c. Nutritional terms & tools    Healthy Plate method    Carbohydrate Counting    Reading food labels    Free apps        The participant    Uses Healthy Plate principles in constructing meals Yes   Reads food labels in choosing acceptable foods Yes    The participant would benefit from reflecting on her meals consumed prior to her \"feeling hungry and I need a snack\". Today our discussion focused on identifying what foods \"fit\" as protein/fat/carbohydrate/free or non starchy vegetables. Suspect she may not be consuming adequate CHO with her meals to support her appetite, leading to hunger triggers and snacking. Discussed how snacking can increase kcal intakes and impeded weight loss even if healthy food options are chosen.        Julio Jain RD on 12/2/2021 at 10:53 AM    I have personally reviewed the health record, including provider notes, laboratory data and current medications before making these care and education recommendations. The time spent in this effort is included in the total time. Total minutes: 60 minutes, encounter date: 63/2/11    [facility] for Telehealth:  Chaparro Cage was evaluated in person for today's visit.

## 2021-12-14 ENCOUNTER — CLINICAL SUPPORT (OUTPATIENT)
Dept: DIABETES SERVICES | Age: 56
End: 2021-12-14
Payer: COMMERCIAL

## 2021-12-14 DIAGNOSIS — E11.9 TYPE 2 DIABETES MELLITUS WITHOUT COMPLICATION, UNSPECIFIED WHETHER LONG TERM INSULIN USE (HCC): Primary | ICD-10-CM

## 2021-12-14 PROCEDURE — G0108 DIAB MANAGE TRN  PER INDIV: HCPCS | Performed by: DIETITIAN, REGISTERED

## 2021-12-15 NOTE — PROGRESS NOTES
[facility] Program for Diabetes Health  Diabetes Self-Management Education & Support Program  Encounter note    SUMMARY  Diabetes self-care management training was completed related to Reducing risks. The participant will return on December 29 to continue DSMES related to Monitoring, Physical activity and Taking medications. The participant did not identify SMART Goal(s): and will practice knowledge and skills related to reducing risks and healthy eating to improve diabetes self-management. 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 Jorge Suarez is due to see her eye doctor for annual care. Jorge Suarez looks at her feet daily and we reviewed daily foot care with diabetes together. She has awesome stress management techniques. RECOMMENDATIONS:  1) discuss vaccines with    2) continue on your routine for dental and eye care. 3) complete daily foot check and care - avoid lotion between toes otherwise keep up your health habits. Next provider visit is scheduled for 1/21/22         DATE DSMES TOPIC EVALUATION   12/14/21 HOW DO I STAY HEALTHY?   a. Prevention    Vaccinations    Preconception care (if applicable)  b. Examinations    Eye     Dental   Foot   c. Diabetic complications' prevention    Heart health    Kidney Health    Nerve health    Sleep health      The participant has a personal diabetes care record to keep abreast of diabetes health Yes, starting today. The participant would benefit from continuing to review her standards of care. Jorge Suarez shared that she has had her foot exam completed by neurology as part of her ongoing follow-up care. She completes routine dental care and is due to see eye doctor prior to end of the year.  She uses meditation, walking, cooking and music as methods to manage her stress. She takes her medications for BP. Shared that she gets good sleep every night. Tammy Bradford RD on 12/15/2021 at 8:44 AM    I have personally reviewed the health record, including provider notes, laboratory data and current medications before making these care and education recommendations. The time spent in this effort is included in the total time. Total minutes: 60 minutes    [facility] for Telehealth:  Frankey Eli was evaluated in person today.

## 2021-12-27 ENCOUNTER — OFFICE VISIT (OUTPATIENT)
Dept: URGENT CARE | Age: 56
End: 2021-12-27
Payer: COMMERCIAL

## 2021-12-27 VITALS — HEART RATE: 64 BPM | OXYGEN SATURATION: 97 % | RESPIRATION RATE: 17 BRPM | TEMPERATURE: 98 F

## 2021-12-27 DIAGNOSIS — R51.9 ACUTE NONINTRACTABLE HEADACHE, UNSPECIFIED HEADACHE TYPE: ICD-10-CM

## 2021-12-27 DIAGNOSIS — R05.9 COUGH: Primary | ICD-10-CM

## 2021-12-27 DIAGNOSIS — R09.89 RUNNY NOSE: ICD-10-CM

## 2021-12-27 DIAGNOSIS — Z20.822 EXPOSURE TO COVID-19 VIRUS: ICD-10-CM

## 2021-12-27 PROCEDURE — 99212 OFFICE O/P EST SF 10 MIN: CPT | Performed by: FAMILY MEDICINE

## 2021-12-27 NOTE — PROGRESS NOTES
This patient was seen at [address] [place] [address] Urgent Care while in their vehicle due to COVID-19 pandemic with PPE and focused examination in order to decrease community viral transmission. The patient/guardian gave verbal consent to treat. Anjana Martinez is a 64 y.o. female who presents with cough, runny nose, HA x 3 days. Was exposed to COVID-19 by family member. Denies  fever, SOB, N/V. The history is provided by the patient. Past Medical History:   Diagnosis Date    Anxiety     Calculus of kidney 2014    seen on ultrasound - no sxs ever    Environmental allergies     GERD (gastroesophageal reflux disease)     hiatal hernia    Hypertension         Past Surgical History:   Procedure Laterality Date    HX COLONOSCOPY  12/2011    [facility], f/u 10 years    NEUROLOGICAL PROCEDURE UNLISTED  2001    lumbar hodge., redo w/L5-S1 fusion (11/02-Ken)         Family History   Problem Relation Age of Onset    Cancer Father         prostate    Hypertension Father     Diabetes Father         Social History     Socioeconomic History    Marital status:      Spouse name: Not on file    Number of children: Not on file    Years of education: Not on file    Highest education level: Not on file   Occupational History    Not on file   Tobacco Use    Smoking status: Never Smoker    Smokeless tobacco: Never Used   Vaping Use    Vaping Use: Never used   Substance and Sexual Activity    Alcohol use: No     Alcohol/week: 0.0 standard drinks    Drug use: No    Sexual activity: Yes     Partners: Male   Other Topics Concern    Not on file   Social History Narrative    Not on file     Social Determinants of Health     Financial Resource Strain:     Difficulty of Paying Living Expenses: Not on file   Food Insecurity:     Worried About Running Out of Food in the Last Year: Not on file    Nikia of Food in the Last Year: Not on file   Transportation Needs:     Lack of Transportation (Medical):  Not on file    Lack of Transportation (Non-Medical): Not on file   Physical Activity:     Days of Exercise per Week: Not on file    Minutes of Exercise per Session: Not on file   Stress:     Feeling of Stress : Not on file   Social Connections:     Frequency of Communication with Friends and Family: Not on file    Frequency of Social Gatherings with Friends and Family: Not on file    Attends Confucianist Services: Not on file    Active Member of [address] [place] [address] or Organizations: Not on file    Attends Club or Organization Meetings: Not on file    Marital Status: Not on file   Intimate Partner Violence:     Fear of Current or Ex-Partner: Not on file    Emotionally Abused: Not on file    Physically Abused: Not on file    Sexually Abused: Not on file   Housing Stability:     Unable to Pay for Housing in the Last Year: Not on file    Number of [place] in the Last Year: Not on file    Unstable Housing in the Last Year: Not on file                ALLERGIES: Naprosyn [naproxen], Hydrochlorothiazide, Macrobid [nitrofurantoin monohyd/m-cryst], and Sulfa (sulfonamide antibiotics)    Review of Systems   Constitutional: Negative for activity change, appetite change and fever. HENT: Positive for rhinorrhea. Respiratory: Positive for cough. Negative for shortness of breath. Gastrointestinal: Positive for diarrhea. Negative for nausea and vomiting. Neurological: Positive for headaches. Vitals:    12/27/21 1215   Pulse: 64   Resp: 17   Temp: 98 °F (36.7 °C)   SpO2: 97%       Physical Exam  Vitals and nursing note reviewed. Constitutional:       General: She is not in acute distress. Appearance: She is well-developed. She is not diaphoretic. Pulmonary:      Effort: Pulmonary effort is normal. No respiratory distress. Breath sounds: Normal breath sounds. No stridor. No wheezing, rhonchi or rales. Neurological:      Mental Status: She is alert.    Psychiatric:         Behavior: Behavior normal. Thought Content: Thought content normal.         Judgment: Judgment normal.         MDM    ICD-10-CM ICD-9-CM   1. Cough  R05.9 786.2   2. Acute nonintractable headache, unspecified headache type  R51.9 784.0   3. Runny nose  R09.89 784.99   4. Exposure to COVID-19 virus  Z20.822 V01.79       Orders Placed This Encounter    NOVEL CORONAVIRUS (COVID-19)     Scheduling Instructions:      1) Due to current limited availability of the COVID-19 PCR test, tests will be prioritized and may not be completed.              2) Order only if the test result will change clinical management or necessary for a return to mission-critical employment decision.              3) Print and instruct patient to adhere to CDC home isolation program. (Link Above)              4) Set up or refer patient for a monitoring program.              5) Have patient sign up for and leverage MyChart (if not previously done). Order Specific Question:   Is this test for diagnosis or screening? Answer:   Diagnosis of ill patient     Order Specific Question:   Symptomatic for COVID-19 as defined by CDC? Answer:   Yes     Order Specific Question:   Date of Symptom Onset     Answer:   12/25/2021     Order Specific Question:   Hospitalized for COVID-19? Answer:   No     Order Specific Question:   Admitted to ICU for COVID-19? Answer:   No     Order Specific Question:   Employed in healthcare setting? Answer:   Unknown     Order Specific Question:   Resident in a congregate (group) care setting? Answer:   No     Order Specific Question:   Pregnant? Answer:   No     Order Specific Question:   Previously tested for COVID-19?      Answer:   Unknown        Quarantine x 10 days from sx onset; await results  Deep breathing exercises, ambulation  Tylenol prn  Increase fluids with electrolytes if decreased PO intake  MyChart: active  Provider will call if PCR COVID-19 test is positive     If signs and symptoms become worse the pt is to go to the ER.          Procedures

## 2021-12-30 LAB — SARS-COV-2, NAA: NOT DETECTED

## 2022-01-12 ENCOUNTER — CLINICAL SUPPORT (OUTPATIENT)
Dept: DIABETES SERVICES | Age: 57
End: 2022-01-12
Payer: COMMERCIAL

## 2022-01-12 DIAGNOSIS — E11.9 TYPE 2 DIABETES MELLITUS WITHOUT COMPLICATION, UNSPECIFIED WHETHER LONG TERM INSULIN USE (HCC): Primary | ICD-10-CM

## 2022-01-12 PROCEDURE — G0108 DIAB MANAGE TRN  PER INDIV: HCPCS | Performed by: DIETITIAN, REGISTERED

## 2022-01-13 NOTE — PROGRESS NOTES
[employer] [facility]  Diabetes Self-Management Education & Support Program    SUMMARY  Diabetes self-care management training was completed related to taking medications and physical activity. The participant will return on 1/25/22 to continue DSMES related to healthy coping and problem solving. The participant did not identify SMART Goal(s) and will practice knowledge and skills related to reducing risks, healthy eating and monitoring and being active and medications to improve diabetes self-management. EVALUATION:  Jacy Ruth shared that she has completed her eye (12/20/21) and her dental (1/11/22) exams. She is walking most days more than 150 minutes per week prior to her referral. She shared that she really likes using chair based exercises and has taken classes in the past. Currently not on medications - briefly reviewed all classes with her and when A1c would suggest it may be time to intervene with medication. We briefly reviewed monitoring in case PCP suggests it would be beneficial at her f/u visit. RECOMMENDATIONS:  1) discuss SBGM with PCP at your follow up. 2) continue with your current activity and try some of the sample chair based activities discussed today. Next provider visit is scheduled for 1/21/22. DATE DSMES TOPIC EVALUATION     1/13/2022 HOW CAN BLOOD GLUCOSE MONITORING HELP ME?   a. Value of blood glucose monitoring   b. Realistic expectations   c. Blood glucose monitoring targets   d. Target adjustments   e. Setting a1c & blood glucose targets with provider   f. Meter selection    g. Technique for obtaining blood droplet   h. Blood glucose testing sites   i. Determining best times to test   j. Pregnancy recommendations   k.  Data sharing with provider        The participant    Can demonstrate their glucometer procedure: No   Logs their BG readings:  No    The participant needs to address if monitoring is an intervention PCP would support at this time given she is currently managing with diet and exercise. DATE DSMES TOPIC EVALUATION     1/13/2022 HOW DO MY DIABETES MEDICATIONS WORK?   a. Type 1 medications & methods    Insulin injections    Injection sites   b. Type 2 medications    Oral agents    GLP-1 agonists   c. Hypoglycemia symptoms & treatment    Glucagon emergency kits   d. General guidance regarding insulin use whether Type 1, 2 or gestational diabetes    Storage of insulin    Disposal     Traveling with medications   e. Barriers to medication adherence      The participant    Can describe the expected action & side effects of prescribed diabetes medications: Yes- currently not on medications.  Can demonstrate injection technique (if applicable): No- not applicable    The participant does not need to address medications with her provider at this time. DATE DSMES TOPIC EVALUATION     1/13/2022 HOW DOES PHYSICAL ACTIVITY AFFECT MY DIABETES?   a. Benefits of physical activity   b. Beginning a program of physical activity    Walking    Pedometers    Goal setting   c. Structured physical activity program    Aerobic activity    Resistance    Flexibility    Balance   d. Physical activity program progression   e. Safety issues   f. Barriers to physical activity   g. Facilitators of physical activity        The participant has established a regular physical activity plan: Yes    The participant needs to address nothing related to activity - but, she is planning to increase her frequency of resistance training and continue with her stretching. [facility] [employer]:    Ranjeet Simeon is a 64 y.o. female being evaluated in person 1/12/22  Time in appointment: 60 minutes    [address] [facility], [place], [facility] on 1/13/2022  An electronic signature was used to authenticate this note.

## 2022-01-14 LAB
ALBUMIN SERPL-MCNC: 4.7 G/DL (ref 3.8–4.9)
ALBUMIN/GLOB SERPL: 1.7 {RATIO} (ref 1.2–2.2)
ALP SERPL-CCNC: 83 IU/L (ref 44–121)
ALT SERPL-CCNC: 13 IU/L (ref 0–32)
AST SERPL-CCNC: 21 IU/L (ref 0–40)
BILIRUB SERPL-MCNC: 0.4 MG/DL (ref 0–1.2)
BUN SERPL-MCNC: 15 MG/DL (ref 6–24)
BUN/CREAT SERPL: 18 (ref 9–23)
CALCIUM SERPL-MCNC: 9.7 MG/DL (ref 8.7–10.2)
CHLORIDE SERPL-SCNC: 100 MMOL/L (ref 96–106)
CHOLEST SERPL-MCNC: 253 MG/DL (ref 100–199)
CO2 SERPL-SCNC: 27 MMOL/L (ref 20–29)
CREAT SERPL-MCNC: 0.85 MG/DL (ref 0.57–1)
EST. AVERAGE GLUCOSE BLD GHB EST-MCNC: 140 MG/DL
GLOBULIN SER CALC-MCNC: 2.7 G/DL (ref 1.5–4.5)
GLUCOSE SERPL-MCNC: 101 MG/DL (ref 65–99)
HBA1C MFR BLD: 6.5 % (ref 4.8–5.6)
HDLC SERPL-MCNC: 90 MG/DL
IMP & REVIEW OF LAB RESULTS: NORMAL
LDLC SERPL CALC-MCNC: 156 MG/DL (ref 0–99)
POTASSIUM SERPL-SCNC: 4.3 MMOL/L (ref 3.5–5.2)
PROT SERPL-MCNC: 7.4 G/DL (ref 6–8.5)
SODIUM SERPL-SCNC: 140 MMOL/L (ref 134–144)
TRIGL SERPL-MCNC: 48 MG/DL (ref 0–149)
VLDLC SERPL CALC-MCNC: 7 MG/DL (ref 5–40)

## 2022-01-21 ENCOUNTER — OFFICE VISIT (OUTPATIENT)
Dept: INTERNAL MEDICINE CLINIC | Age: 57
End: 2022-01-21
Payer: COMMERCIAL

## 2022-01-21 VITALS
BODY MASS INDEX: 25.55 KG/M2 | SYSTOLIC BLOOD PRESSURE: 117 MMHG | HEIGHT: 66 IN | RESPIRATION RATE: 20 BRPM | TEMPERATURE: 98 F | WEIGHT: 159 LBS | HEART RATE: 81 BPM | DIASTOLIC BLOOD PRESSURE: 72 MMHG

## 2022-01-21 DIAGNOSIS — E78.00 PURE HYPERCHOLESTEROLEMIA: ICD-10-CM

## 2022-01-21 DIAGNOSIS — I10 ESSENTIAL HYPERTENSION: ICD-10-CM

## 2022-01-21 DIAGNOSIS — Z23 ENCOUNTER FOR IMMUNIZATION: ICD-10-CM

## 2022-01-21 DIAGNOSIS — E11.9 CONTROLLED TYPE 2 DIABETES MELLITUS WITHOUT COMPLICATION, WITHOUT LONG-TERM CURRENT USE OF INSULIN (HCC): Primary | ICD-10-CM

## 2022-01-21 PROCEDURE — 90471 IMMUNIZATION ADMIN: CPT | Performed by: INTERNAL MEDICINE

## 2022-01-21 PROCEDURE — 90686 IIV4 VACC NO PRSV 0.5 ML IM: CPT | Performed by: INTERNAL MEDICINE

## 2022-01-21 PROCEDURE — 99214 OFFICE O/P EST MOD 30 MIN: CPT | Performed by: INTERNAL MEDICINE

## 2022-01-21 RX ORDER — ATORVASTATIN CALCIUM 10 MG/1
10 TABLET, FILM COATED ORAL DAILY
Qty: 30 TABLET | Refills: 5 | Status: SHIPPED | OUTPATIENT
Start: 2022-01-21 | End: 2022-04-21 | Stop reason: SDUPTHER

## 2022-01-21 NOTE — PROGRESS NOTES
Chief Complaint   Patient presents with    Follow-up     1. Have you been to the ER, urgent care clinic since your last visit? Hospitalized since your last visit? No    2. Have you seen or consulted any other health care providers outside of the [address] [place] [address] since your last visit? Include any pap smears or colon screening.  No     Visit Vitals  /72   Pulse 81   Temp 98 °F (36.7 °C) (Temporal)   Resp 20   Ht 5' 6\" (1.676 m)   Wt 159 lb (72.1 kg)   BMI 25.66 kg/m²

## 2022-01-21 NOTE — PROGRESS NOTES
Subjective:     Adam Hassan is a 64 y.o. female seen for follow up of diabetes (newly diagnosed, diet control only). A1C is stable at 6.5%  She also has hypertension. Diabetic Review of Systems - medication compliance: compliant all of the time, diabetic diet compliance: compliant most of the time - worked with dietician, not exercising as yoga class shut down and too cold to walk, home glucose monitoring: is not performed, further diabetic ROS: no polyuria or polydipsia, no chest pain, dyspnea or TIA's, no numbness, tingling or pain in extremities, no unusual visual symptoms, last eye exam approximately 1 month ago - Dr. Kati Pelayo ([facility]). Other symptoms and concerns:   Increased stress. Trying to eat right. Pinky Angles Up to date dentist.    [facility] 11/2021 and pap through Julissa Cheema, [phone]  [facility] COVID booster. Current Outpatient Medications   Medication Sig Dispense Refill    atorvastatin (LIPITOR) 10 mg tablet Take 1 Tablet by mouth daily. 30 Tablet 5    chlorthalidone (HYGROTON) 25 mg tablet TAKE 1/2 TABLET BY MOUTH EVERY DAY 45 Tablet 3    lisinopriL (PRINIVIL, ZESTRIL) 40 mg tablet TAKE 1 TABLET BY MOUTH EVERY DAY 90 Tablet 3    amLODIPine (NORVASC) 5 mg tablet TAKE 1 TABLET BY MOUTH EVERY DAY 90 Tablet 3    fexofenadine (ALLEGRA) 180 mg tablet Take 180 mg by mouth daily.  fluticasone (Flonase Sensimist) 27.5 mcg/actuation nasal spray 2 Sprays by Nasal route daily.  ALPRAZolam (XANAX) 0.5 mg tablet Take 0.5-1 Tabs by mouth two (2) times daily as needed for Anxiety. Max Daily Amount: 1 mg. 30 Tab 1    omega 3-dha-epa-fish oil (Fish Oil) 100-160-1,000 mg cap Take  by mouth.  multivitamin (ONE A DAY) tablet Take 1 Tab by mouth daily.  cholecalciferol, vitamin D3, (VITAMIN D3 PO) Take  by mouth.           Lab Results   Component Value Date/Time    WBC 5.2 07/17/2020 12:00 AM    HGB 12.5 07/17/2020 12:00 AM    HCT 37.7 07/17/2020 12:00 AM    PLATELET 965 81/11/2479 12:00 AM MCV 88 07/17/2020 12:00 AM     Lab Results   Component Value Date/Time    Hemoglobin A1c 6.5 (H) 01/13/2022 12:05 PM    Hemoglobin A1c 6.5 (H) 10/14/2021 07:48 AM    Hemoglobin A1c 6.2 (H) 06/25/2021 09:18 AM    Hemoglobin A1c, External 0.0 04/20/2021 12:00 AM    Glucose 101 (H) 01/13/2022 12:05 PM    LDL, calculated 156 (H) 01/13/2022 12:05 PM    LDL, calculated 123 (H) 07/17/2020 12:00 AM    Creatinine (POC) 0.6 03/29/2011 07:50 AM    Creatinine 0.85 01/13/2022 12:05 PM      Lab Results   Component Value Date/Time    Cholesterol, total 253 (H) 01/13/2022 12:05 PM    HDL Cholesterol 90 01/13/2022 12:05 PM    LDL, calculated 156 (H) 01/13/2022 12:05 PM    LDL, calculated 123 (H) 07/17/2020 12:00 AM    LDL-C, External 0 04/20/2021 12:00 AM    Triglyceride 48 01/13/2022 12:05 PM     Lab Results   Component Value Date/Time    ALT (SGPT) 13 01/13/2022 12:05 PM    Alk. phosphatase 83 01/13/2022 12:05 PM    Bilirubin, total 0.4 01/13/2022 12:05 PM    Albumin 4.7 01/13/2022 12:05 PM    Protein, total 7.4 01/13/2022 12:05 PM    PLATELET 129 30/06/8310 12:00 AM     Lab Results   Component Value Date/Time    GFR est non-AA 77 01/13/2022 12:05 PM    GFRNA, POC >60 03/29/2011 07:50 AM    GFR est AA 89 01/13/2022 12:05 PM    GFRAA, POC >60 03/29/2011 07:50 AM    Creatinine 0.85 01/13/2022 12:05 PM    Creatinine (POC) 0.6 03/29/2011 07:50 AM    BUN 15 01/13/2022 12:05 PM    Sodium 140 01/13/2022 12:05 PM    Potassium 4.3 01/13/2022 12:05 PM    Chloride 100 01/13/2022 12:05 PM    CO2 27 01/13/2022 12:05 PM    PTH, Intact 34 12/19/2016 09:57 AM        Review of Systems  Pertinent items are noted in HPI. Objective:     Visit Vitals  /72   Pulse 81   Temp 98 °F (36.7 °C) (Temporal)   Resp 20   Ht 5' 6\" (1.676 m)   Wt 159 lb (72.1 kg)   BMI 25.66 kg/m²     Appearance: alert, well appearing, and in no distress and oriented to person, place, and time.   Exam:   NECK: supple, no lad, no bruit, no tm  LUNGS: cta bilat  CV rrr, no m/g/r  ABD: soft, nt, nd, nabs  EXT: no c/c/e  feet: warm, good capillary refill, no trophic changes or ulcerative lesions, normal DP and PT pulses, normal monofilament exam and normal sensory exam  Lab review: A1C unchanged at 6.5%. Cholesterol elelvate. d  . Assessment/Plan:     diabetes well controlled. Diabetic issues reviewed with her: discussed diet and exercise for BS control  Labs reviewed  Foot exam.   Continue to work with dietician    htn - controlled, cotn same    hld - not at goal.  Start Atorvastatin. HM- flu shot admin today. Orders Placed This Encounter    NJ IMMUNIZ ADMIN,1 SINGLE/COMB VAC/TOXOID    INFLUENZA VIRUS VAC QUAD,SPLIT,PRESV FREE SYRINGE IM (Flulaval, Fluzone, Fluarix) (38117)    METABOLIC PANEL, COMPREHENSIVE    LIPID PANEL    HEMOGLOBIN A1C WITH EAG    MICROALBUMIN, UR, RAND W/ MICROALB/CREAT RATIO    atorvastatin (LIPITOR) 10 mg tablet     Follow-up and Dispositions    · Return in about 3 months (around 4/21/2022) for htn, hyperlipidemia, diabetes.

## 2022-01-25 ENCOUNTER — CLINICAL SUPPORT (OUTPATIENT)
Dept: DIABETES SERVICES | Age: 57
End: 2022-01-25
Payer: COMMERCIAL

## 2022-01-25 DIAGNOSIS — E11.9 TYPE 2 DIABETES MELLITUS WITHOUT COMPLICATION, UNSPECIFIED WHETHER LONG TERM INSULIN USE (HCC): Primary | ICD-10-CM

## 2022-01-25 PROCEDURE — G0108 DIAB MANAGE TRN  PER INDIV: HCPCS | Performed by: DIETITIAN, REGISTERED

## 2022-01-26 NOTE — PROGRESS NOTES
[facility]  Diabetes Self-Management Education & Support Program  Encounter note    SUMMARY  Diabetes self-care management training was completed related to Healthy coping and Problem solving. The participant will return on March 7 to continue DSMES related to problem solving review and post program assessment. The participant did identify SMART Goal(s): - see below, and will practice knowledge and skills related to reducing risks, healthy eating and monitoring and being active and medications to improve diabetes self-management. EVALUATION:  Terrell Tamayo arrived discouraged by her recent lab results with  and updated that she has been started on a statin - her A1c is stable at 6.5% and her LDL is <150 mg/dl. We discussed factors influencing her LDL and behaviors she can adjust. Terrell Tamayo has routine exercise in place along w/ some stress management techniques but did share that her stress has been higher over past months. Reinforced her current skills, exercise/activity breaks and her diet habits (low saturated fat, lean protein, avoids fatty foods and limits dietary sources of cholesterol). Terrell Tamayo identified dietary changes as an area for further changes and we discussed specific options with her. RECOMMENDATIONS:  1) continue with your walking and exercising - consider adding chair based activity and stretching into your work breaks which you already do. 2) discussed dietary sources of soluble fiber - barley, beans, lentils and lentil based rices/pastas along with her oatmeal.     Next provider visit is scheduled for 4/21/22       SMART GOAL(S)  1. Increase complex carbohydrates to help meet dietary fiber goals of ~27 g per day. ACHIEVEMENT OF GOAL(S)  [] 0-24%     [] 25-49%     [] 50-74%     [] %           DATE DSMES TOPIC EVALUATION     1/25/22 HOW DO I FIND SUPPORT TO TACKLE THIS CONDITION?   a.  Normal responses to diabetes diagnosis or complication    Shock    Anger & resentment    Guilt/self-blame    Sadness & worry    Depression     Anxiety    Pregnancy   b. Constructive strategies to normal responses     Exploring feelings & attitudes    Motivation: Cost versus benefits analysis    Problem-solving: Chain analysis    Obtaining support: Communicating   i. Family & friends   ii. Health team   iii. Community   c. Stress    Symptoms    Managing stress    Fill your tool kit        The participant can identify people that support them in caring for their diabetes health:  Yes- family     The participant would like to pursue the following in keeping themselves healthy after completing the program:  Will have plan for next visit. The participant would benefit from continuing to use her stress management techiques - reading, breathing/meditation and exercise. We used SOAR problem solving technique to address her stress related to recent labs. We discussed taking more breaks to manage stress with work and to tap into her current techniques she uses. We will need to revisit support plan at f/u visit. DATE DSMES TOPIC EVALUATION       1/25/22 HOW DO I FIGURE OUT WHAT'S INFLUENCING MY BLOOD GLUCOSES?   a. Problem solving using SOAR    Set goals    Sort options    Arrive at a plan    Reaffirm plan   b. Common problems in diabetes self-care    Hypoglycemia    Hyperglycemia    Sick days   c. Pattern management   d. Disaster preparedness plan        The participant has an action plan for    Hypoglycemia will review at next visit.  Hyperglycemia will review at next visit.  Sick days will review at next visit.  During disasters will review at next visit. The participant would benefit from using the options that we discussed together diet and exercise to address her increasing LDL cholesterol values - shared that her stress levels with work have been increasing and that she feels this is a source along with her family hx.  We used the SOAR technique to set goals and also find options that will impact her diabetes/BG control and her cholesterol. Edward Adams RD, [facility] on 1/26/2022 at 4:26 PM    Total minutes: 61    [facility] [employer]:  Ernestine Gaines, was evaluated in person for her visit today.

## 2022-03-07 ENCOUNTER — CLINICAL SUPPORT (OUTPATIENT)
Dept: DIABETES SERVICES | Age: 57
End: 2022-03-07
Payer: COMMERCIAL

## 2022-03-07 DIAGNOSIS — E11.9 TYPE 2 DIABETES MELLITUS WITHOUT COMPLICATION, UNSPECIFIED WHETHER LONG TERM INSULIN USE (HCC): Primary | ICD-10-CM

## 2022-03-07 PROCEDURE — G0108 DIAB MANAGE TRN  PER INDIV: HCPCS | Performed by: DIETITIAN, REGISTERED

## 2022-03-07 NOTE — PROGRESS NOTES
[facility]  Diabetes Self-Management Education & Support Program  Encounter note    SUMMARY  Diabetes self-care management training was completed related to Healthy coping and Problem solving. The participant will return on April 27 to continue DSMES related to complete post program follow up visit. The participant did not identify SMART Goal(s): not this visit, and will practice knowledge and skills related to reducing risks, healthy eating and monitoring, being active and medications and healthy coping and problem solving to improve diabetes self-management. EVALUATION:  Felix Blancas returns to complete healthy coping and problem solving today. She shared that she has been working hard focusing on walking and healthy eating. She finds that her meal prep, menu planning and her walking are helping her to stay on track. She will reach out to provider going forward to assess if routine monitoring of BG is needed. RECOMMENDATIONS:  None at this time. Next provider visit is scheduled for 4/21/22       SMART GOAL(S)  1. Increase complex carbohydrates to help meet dietary fiber goals of ~27 g per day. ACHIEVEMENT OF GOAL(S)  [] 0-24%     [] 25-49%     [] 50-74%     [] %         DATE DSMES TOPIC EVALUATION     3/7/2022 HOW DO I FIND SUPPORT TO TACKLE THIS CONDITION?   a. Normal responses to diabetes diagnosis or complication    Shock    Anger & resentment    Guilt/self-blame    Sadness & worry    Depression     Anxiety    Pregnancy   b. Constructive strategies to normal responses     Exploring feelings & attitudes    Motivation: Cost versus benefits analysis    Problem-solving: Chain analysis    Obtaining support: Communicating   i. Family & friends   ii. Health team   iii.  Community   c. Stress    Symptoms    Managing stress    Fill your tool kit        The participant can identify people that support them in caring for their diabetes health:  yes    The participant would like to pursue the following in keeping themselves healthy after completing the program:  Websits  The participant would benefit from continuing to export options for support. She has a walking rosalinda that coordinates their new pradhan and trails the meet up at and complete walks - Víctor Fournier shared that this helps to continue with motivation. DATE DSMES TOPIC EVALUATION     3/7/2022 HOW DO I FIGURE OUT WHAT'S INFLUENCING MY BLOOD GLUCOSES?   a. Problem solving using SOAR    Set goals    Sort options    Arrive at a plan    Reaffirm plan   b. Common problems in diabetes self-care    Hypoglycemia    Hyperglycemia    Sick days   c. Pattern management   d. Disaster preparedness plan        The participant has an action plan for    Hypoglycemia Yes   Hyperglycemia Yes   Sick days Yes   During disasters Yes    The participant would benefit from reviewing sick day food options that reflect her preferred food choices. Currently not testing BG and we discussed the option of having a Freestyle Juan F meter to use with foil wrapped strips to use when ill (strips  based on date on foil . Vickie Abbott RD, [facility] on 3/7/2022 at 1:56 PM    Total minutes: 61    [facility] [employer]:  Aga Eckert, was evaluated in person.

## 2022-03-12 LAB — CREATININE, EXTERNAL: 0.75

## 2022-03-18 PROBLEM — M50.30 DEGENERATIVE DISC DISEASE, CERVICAL: Status: ACTIVE | Noted: 2018-01-12

## 2022-03-18 PROBLEM — M54.2 CERVICALGIA: Status: ACTIVE | Noted: 2018-01-12

## 2022-03-19 PROBLEM — M54.50 LOW BACK PAIN: Status: ACTIVE | Noted: 2018-05-29

## 2022-03-19 PROBLEM — N95.9 PERIMENOPAUSAL DISORDER: Status: ACTIVE | Noted: 2017-10-30

## 2022-03-19 PROBLEM — M54.12 CERVICAL RADICULITIS: Status: ACTIVE | Noted: 2017-07-07

## 2022-03-19 PROBLEM — Z98.1 STATUS POST LUMBAR SPINAL FUSION: Status: ACTIVE | Noted: 2018-05-29

## 2022-03-20 PROBLEM — M54.31 RIGHT SIDED SCIATICA: Status: ACTIVE | Noted: 2018-05-29

## 2022-04-14 LAB
ALBUMIN SERPL-MCNC: 5 G/DL (ref 3.8–4.9)
ALBUMIN/CREAT UR: 7 MG/G CREAT (ref 0–29)
ALBUMIN/GLOB SERPL: 2.3 {RATIO} (ref 1.2–2.2)
ALP SERPL-CCNC: 81 IU/L (ref 44–121)
ALT SERPL-CCNC: 19 IU/L (ref 0–32)
AST SERPL-CCNC: 23 IU/L (ref 0–40)
BILIRUB SERPL-MCNC: 0.5 MG/DL (ref 0–1.2)
BUN SERPL-MCNC: 17 MG/DL (ref 6–24)
BUN/CREAT SERPL: 21 (ref 9–23)
CALCIUM SERPL-MCNC: 9.8 MG/DL (ref 8.7–10.2)
CHLORIDE SERPL-SCNC: 102 MMOL/L (ref 96–106)
CHOLEST SERPL-MCNC: 179 MG/DL (ref 100–199)
CO2 SERPL-SCNC: 24 MMOL/L (ref 20–29)
CREAT SERPL-MCNC: 0.81 MG/DL (ref 0.57–1)
CREAT UR-MCNC: 124.5 MG/DL
EGFR: 85 ML/MIN/1.73
EST. AVERAGE GLUCOSE BLD GHB EST-MCNC: 134 MG/DL
GLOBULIN SER CALC-MCNC: 2.2 G/DL (ref 1.5–4.5)
GLUCOSE SERPL-MCNC: 117 MG/DL (ref 65–99)
HBA1C MFR BLD: 6.3 % (ref 4.8–5.6)
HDLC SERPL-MCNC: 97 MG/DL
IMP & REVIEW OF LAB RESULTS: NORMAL
LDLC SERPL CALC-MCNC: 73 MG/DL (ref 0–99)
MICROALBUMIN UR-MCNC: 9.2 UG/ML
POTASSIUM SERPL-SCNC: 4.3 MMOL/L (ref 3.5–5.2)
PROT SERPL-MCNC: 7.2 G/DL (ref 6–8.5)
SODIUM SERPL-SCNC: 144 MMOL/L (ref 134–144)
TRIGL SERPL-MCNC: 42 MG/DL (ref 0–149)
VLDLC SERPL CALC-MCNC: 9 MG/DL (ref 5–40)

## 2022-04-21 ENCOUNTER — OFFICE VISIT (OUTPATIENT)
Dept: INTERNAL MEDICINE CLINIC | Age: 57
End: 2022-04-21
Payer: COMMERCIAL

## 2022-04-21 VITALS
HEART RATE: 72 BPM | HEIGHT: 66 IN | SYSTOLIC BLOOD PRESSURE: 125 MMHG | DIASTOLIC BLOOD PRESSURE: 71 MMHG | RESPIRATION RATE: 14 BRPM | BODY MASS INDEX: 26.2 KG/M2 | OXYGEN SATURATION: 99 % | WEIGHT: 163 LBS | TEMPERATURE: 98.3 F

## 2022-04-21 DIAGNOSIS — I10 ESSENTIAL HYPERTENSION: ICD-10-CM

## 2022-04-21 DIAGNOSIS — E78.00 PURE HYPERCHOLESTEROLEMIA: ICD-10-CM

## 2022-04-21 DIAGNOSIS — E11.9 CONTROLLED TYPE 2 DIABETES MELLITUS WITHOUT COMPLICATION, WITHOUT LONG-TERM CURRENT USE OF INSULIN (HCC): Primary | ICD-10-CM

## 2022-04-21 PROCEDURE — 99214 OFFICE O/P EST MOD 30 MIN: CPT | Performed by: INTERNAL MEDICINE

## 2022-04-21 RX ORDER — ATORVASTATIN CALCIUM 10 MG/1
10 TABLET, FILM COATED ORAL DAILY
Qty: 90 TABLET | Refills: 3 | Status: SHIPPED | OUTPATIENT
Start: 2022-04-21

## 2022-04-21 NOTE — PROGRESS NOTES
Reviewed record in preparation for visit and have obtained necessary documentation. Identified pt with two pt identifiers(name and ). Chief Complaint   Patient presents with    Follow-up     Vitals:    22 0826   BP: 125/71   Pulse: 72   Resp: 14   Temp: 98.3 °F (36.8 °C)   TempSrc: Temporal   SpO2: 99%   Weight: 163 lb (73.9 kg)   Height: 5' 6\" (1.676 m)        Health Maintenance Due   Topic Date Due    Pneumococcal Vaccine (1 - PCV) Never done    Eye Exam  Never done    Cervical cancer screen  10/23/2020    Mammogram  2020    Shingles Vaccine (2 of 2) 2021       Ms. Jose Angel Conley has a reminder for a \"due or due soon\" health maintenance. I have asked that she discuss this further with her primary care provider for follow-up on this health maintenance. Coordination of Care Questionnaire:  :     1) Have you been to an emergency room, urgent care clinic since your last visit? no   Hospitalized since your last visit? no             2) Have you seen or consulted any other health care providers outside of [address] [place] [address] since your last visit? no  (Include any pap smears or colon screenings in this section.)    3. For patients aged 39-70: Has the patient had a colonoscopy / FIT/ Cologuard? Yes - Care Gap present. Most recent result on file      If the patient is female:  4. For patients aged 41-77: Has the patient had a mammogram within the past 2 years? Yes - Care Gap present. Most recent result on file       5. For patients aged 21-65: Has the patient had a pap smear? Yes - Care Gap present. Most recent result on file      In the event something were to happen to you and you were unable to speak on your behalf, do you have an Advance Directive/ Living Will in place stating your wishes?  NO    Do you have an Advance Directive on file? no    6) Are you interested in receiving information on Advance Directives? no    Patient is accompanied by self I have received verbal consent from Tiffany Fernandes to discuss any/all medical information while they are present in the room.

## 2022-04-21 NOTE — PROGRESS NOTES
Subjective:     Tiffany Fernandes is a 62 y.o. female seen for follow up of diabetes. She also has hypertension and hyperlipidemia. Diabetic Review of Systems - medication compliance: compliant all of the time, diabetic diet compliance: compliant most of the time, exercising every day home glucose monitoring: is not performed, further diabetic ROS: no polyuria or polydipsia, no chest pain, dyspnea or TIA's, no numbness, tingling or pain in extremities, no unusual visual symptoms, last eye exam approximately 1 week ago - Dr. Thomasenia Mcburney. Other symptoms and concerns:   Checking blood pressure at home. Running 110-120/60-70s. Seeing PT for upper back and neck. Helping. Mammogram and pap through [facility]. Has received 1 of 2 Shingrix. Current Outpatient Medications   Medication Sig Dispense Refill    atorvastatin (LIPITOR) 10 mg tablet Take 1 Tablet by mouth daily. 30 Tablet 5    chlorthalidone (HYGROTON) 25 mg tablet TAKE 1/2 TABLET BY MOUTH EVERY DAY 45 Tablet 3    lisinopriL (PRINIVIL, ZESTRIL) 40 mg tablet TAKE 1 TABLET BY MOUTH EVERY DAY 90 Tablet 3    amLODIPine (NORVASC) 5 mg tablet TAKE 1 TABLET BY MOUTH EVERY DAY 90 Tablet 3    fexofenadine (ALLEGRA) 180 mg tablet Take 180 mg by mouth daily.  fluticasone (Flonase Sensimist) 27.5 mcg/actuation nasal spray 2 Sprays by Nasal route daily.  ALPRAZolam (XANAX) 0.5 mg tablet Take 0.5-1 Tabs by mouth two (2) times daily as needed for Anxiety. Max Daily Amount: 1 mg. 30 Tab 1    omega 3-dha-epa-fish oil (Fish Oil) 100-160-1,000 mg cap Take  by mouth.  multivitamin (ONE A DAY) tablet Take 1 Tab by mouth daily.  cholecalciferol, vitamin D3, (VITAMIN D3 PO) Take  by mouth.           Lab Results   Component Value Date/Time    Hemoglobin A1c 6.3 (H) 04/13/2022 08:00 AM    Hemoglobin A1c 6.5 (H) 01/13/2022 12:05 PM    Hemoglobin A1c 6.5 (H) 10/14/2021 07:48 AM    Hemoglobin A1c, External 0.0 04/20/2021 12:00 AM    Glucose 117 (H) 04/13/2022 08:00 AM    Microalb/Creat ratio (ug/mg creat.) 7 04/13/2022 08:00 AM    LDL, calculated 73 04/13/2022 08:00 AM    LDL, calculated 123 (H) 07/17/2020 12:00 AM    Creatinine (POC) 0.6 03/29/2011 07:50 AM    Creatinine 0.81 04/13/2022 08:00 AM      Lab Results   Component Value Date/Time    Cholesterol, total 179 04/13/2022 08:00 AM    HDL Cholesterol 97 04/13/2022 08:00 AM    LDL, calculated 73 04/13/2022 08:00 AM    LDL, calculated 123 (H) 07/17/2020 12:00 AM    LDL-C, External 0 04/20/2021 12:00 AM    Triglyceride 42 04/13/2022 08:00 AM     Lab Results   Component Value Date/Time    ALT (SGPT) 19 04/13/2022 08:00 AM    Alk. phosphatase 81 04/13/2022 08:00 AM    Bilirubin, total 0.5 04/13/2022 08:00 AM    Albumin 5.0 (H) 04/13/2022 08:00 AM    Protein, total 7.2 04/13/2022 08:00 AM    PLATELET 393 30/99/6496 12:00 AM     Lab Results   Component Value Date/Time    GFR est non-AA 77 01/13/2022 12:05 PM    GFRNA, POC >60 03/29/2011 07:50 AM    GFR est AA 89 01/13/2022 12:05 PM    GFRAA, POC >60 03/29/2011 07:50 AM    Creatinine 0.81 04/13/2022 08:00 AM    Creatinine (POC) 0.6 03/29/2011 07:50 AM    BUN 17 04/13/2022 08:00 AM    Sodium 144 04/13/2022 08:00 AM    Potassium 4.3 04/13/2022 08:00 AM    Chloride 102 04/13/2022 08:00 AM    CO2 24 04/13/2022 08:00 AM    PTH, Intact 34 12/19/2016 09:57 AM        Review of Systems  Pertinent items are noted in HPI. Objective:     Visit Vitals  /71 (BP 1 Location: Left upper arm, BP Patient Position: Sitting, BP Cuff Size: Adult)   Pulse 72   Temp 98.3 °F (36.8 °C) (Temporal)   Resp 14   Ht 5' 6\" (1.676 m)   Wt 163 lb (73.9 kg)   SpO2 99%   BMI 26.31 kg/m²     Appearance: alert, well appearing, and in no distress and oriented to person, place, and time. Exam:    NECK: supple, no lad, no bruit, no tm  LUNGS: cta bilat  CV rrr, no m/g/r  ABD: soft, nt, nd, nabs  EXT: no c/c/e    Assessment/Plan:       Diagnoses and all orders for this visit:    1. Controlled type 2 diabetes mellitus without complication, without long-term current use of insulin ([address] - controlled, cont same  A1C well controlled. Repeat A1C next visit    2. Essential hypertension - contrlled, cont same     3. Pure hypercholesterolemia - much improved, cont same. Other orders  -     atorvastatin (LIPITOR) 10 mg tablet; Take 1 Tablet by mouth daily.

## 2022-04-27 ENCOUNTER — CLINICAL SUPPORT (OUTPATIENT)
Dept: DIABETES SERVICES | Age: 57
End: 2022-04-27
Payer: COMMERCIAL

## 2022-04-27 DIAGNOSIS — E11.9 TYPE 2 DIABETES MELLITUS WITHOUT COMPLICATION, UNSPECIFIED WHETHER LONG TERM INSULIN USE (HCC): Primary | ICD-10-CM

## 2022-04-27 PROCEDURE — G0108 DIAB MANAGE TRN  PER INDIV: HCPCS | Performed by: DIETITIAN, REGISTERED

## 2022-04-27 NOTE — PROGRESS NOTES
[employer] [facility]  Diabetes Self-Management Education & Support Program  Post-program Evaluation    EVALUATION @ COMPLETION OF THE PROGRAM    Kenzie Carrion completed 6.5  of diabetes self-management education. The participant acquired new knowledge and demonstrated new skills during the program.     The participant worked on the following SMART GOAL(s) to improve their diabetes self-management: 1. Increase complex carbohydrates to help meet dietary fiber goals of ~27 g per day. ACHIEVEMENT OF GOAL(S)  [] 0-24%     [] 25-49%     [] 50-74%     [x] %    The participant's pre-program A1c was   Lab Results   Component Value Date/Time    Hemoglobin A1c 6.3 (H) 2022 08:00 AM    Hemoglobin A1c, External 0.0 2021 12:00 AM   . The post-evaluation A1c is 6.3%. The participant improved their Diabetes Skills, Confidence and Preparedness Index (scored out of 7): Total score: 6.1  Skills: 5.7  Confidence: 5.9  Preparedness: 7.0    FINAL RECOMMENDATIONS:  Vance Malone and I discussed under what circumstances might she need to test BG/encounter s/s of hypoglycemia and how to treat. She has been using her resources and support plan. Shared suggestions for meter kit should she want to check her BG at times - Freestyle Precision Juan F was suggested as foil wrapped strips do not  until the date written on the box unlike test strips in bottle which can  w/in 3-6 months. Next provider visit is scheduled for 2022       [address] [facility], , on 2022 at 3:59 PM    Metric Patient's responses (2022) Areas for improvement     Healthy Eating       The participant is using the Healthy Plate to control carbohydrate intake Yes    The participant reads food labels accurately Yes          Being Active       The participant performs physical activity where your heart beats faster and your breathing is harder than normal for 30 minutes or more?   6 days    In a typical week, the participant performs physical activity 7 days          Monitoring   The participant is monitoring their blood sugars? No, not currently order by PCP    The participant is monitoring not currently required    Blood glucoses are ranging: The participant improved their A1c Yes    The participant understands the meaning of the A1c Yes          Taking Medications   The participant understands what their diabetes medications do No, not currently taking medications    The participant can afford your diabetes medications n/a    The participant does not miss doses of their diabetes medications Not taking diabetes medication          Healthy Coping     The participant feels supported by family, friends and others related to their diabetes self-care practices Yes    The participant plans on utilizing the following community resources after completion of the program: Websites - [employer], [employer] ([email or web address]) jose        Reducing Risks   Vaccines:  Influenza:   Immunization History   Administered Date(s) Administered    Influenza Vaccine 11/01/2018    Influenza Vaccine (Quad) PF (>6 Mo Flulaval, Fluarix, and >3 Yrs [address] [place] [address]) 12/18/2018, 11/05/2020, 01/21/2022       Pneumococcal: There is no immunization history for the selected administration types on file for this patient. Hepatitis: There is no immunization history for the selected administration types on file for this patient.     Examinations:  Diabetic Foot and Eye Exam HM Status   Topic Date Due    Eye Exam  Never done    Diabetic Foot Care  01/21/2023        Dental exam: Last appointment was: 2 weeks ago  Eye exam: December 2021  Foot exam: Last appointment was: 1/20/22    Heart Protection:  BP Readings from Last 2 Encounters:   04/21/22 125/71   01/21/22 117/72        Lab Results   Component Value Date/Time    LDL, calculated 73 04/13/2022 08:00 AM    LDL, calculated 123 (H) 07/17/2020 12:00 AM        Key Anti-Platelet Anticoagulant Meds     The patient is on no antiplatelet meds or anticoagulants. Kidney Protection:  Lab Results   Component Value Date/Time    Microalb/Creat ratio (ug/mg creat.) 7 04/13/2022 08:00 AM      The participant would benefit from additional attention to:    Vaccines:  [] Influenza  [] Pneumococcal  [] Hepatitis    Examinations:  [] Dilated eye exam  [] Dental exam  [] Foot exam    Other:  [] Reviewing long-term complications     Problem Solving   Hypoglycemia Management:  The participant knows the signs and symptoms of hypoglycemia Pt reported being unaware of s/s of hypoglycemia, not experiencing symptoms    The participant knows how to prevent hypoglycemia? Consistent meals/snack times    The participant knows how to treat hypoglycemia? Rule of 15    Hyperglycemia Management:  The participant knows their signs and symptoms of hyperglycemia Frequent urination, Fatigue    The participant knows how to prevent hyperglycemia? Focus on carbohydrate counting/meal planning, Engage in regular physically active, Stay free of illness    Sick Day Management:  The participant knows what to do differently on sick days? Stay hydrated, Eat meals, soft foods, or drink caloric beverages every 4 hours, Take over-the-counter medications as instructed by provider    Pattern Management:  The participant can notice blood glucose patterns when looking at their blood glucose readings not currently testing BG levels per PCP. Note: Content derived from the American Association of Diabetes Educators' Diabetes Education Curriculum: A Guide to Successful Self-Management (3rd edition)      I have personally reviewed the health record, including provider notes, laboratory data and current medications before making these care and education recommendations. The time spent in this effort is included in the total time.   Total minutes: 27    BDEYH-50 Public Health Emergency Adaptations for Telehealth:  Marvin Polk, was evaluated in person    Overall SCPI score: 6.1 Skills Score: 5.7  Low: Blood Sugar Monitoring(Q3),Blood Sugar Monitoring(Q8) Confidence Score: 5.9  Low: Healthy Coping(Q2),Reducing Risks(Q3),Problem PHTTVQZ(R0) Preparedness Score: 7.0  Low: Healthy Eating(Q1),Being Active(Q2),Healthy Coping(Q3),Blood Sugar Monitoring(Q6),Problem Solving(Q7)  Healthy Eating Score: 6.5  Low: Skills(Q1),Confidence(Q1) Taking Medication Score: N/A Blood Sugar Monitoring Score: 5.3  Low: Skills(Q3),Skills(Q8) Reducing Risks Score: 6.0  Low: Confidence(Q3)  Problem Solving Score: 6.0  Low: Confidence(Q7) Healthy Coping Score: 6.3  Low: Confidence(Q2) Being Active Score: 7.0  Low: Confidence(Q5),Preparedness(Q2)    Skills/Knowledge Questions  1. I know how to plan meals that have the best balance between carbohydrates, proteins and vegetables. 6  2. I know how my diabetes medications (pills, injectables and/or insulin) work in my body. 0  3. I know when to check my blood sugar if I want to see how my body responded to a meal. 4  4. I know when to check my blood sugars to determine if my medication or insulin doses are correct. 0  5. I know what to do to prevent a low blood sugar when I exercise (either before, during, or after). 6  6. When I am sick, I know what to do differently with my diabetes management. 6  7. I know how stress can affect my diabetes management. 7  8. When I look at my blood sugars over a given week, I can explain what my blood sugar pattern is. 4  9. I know what my target levels are for A1c, blood pressure and cholesterol. 7  Confidence Questions  1. I am confident that I can plan balanced meals and snacks. 6  2. I am confident that I can manage my stress. 5  3. I am confident that I can prevent a low blood sugar during or after exercise. 5  4. I am confident that the next time I eat out, I will be able to choose foods that best keep my blood sugars in target. 7  5. I am confident I can include exercise into my schedule. 7  6.  I am confident that I can use my daily blood sugars to adjust my diet, my activity, and/or my insulin. 6  7. When something out of my normal routine happens, I am confident that I can problem-solve and keep my diabetes on track. 5  Preparedness Questions  1. Within the next month, I will begin to eat more balanced meals and snacks. 8  2. Within the next month, I will choose an exercise activity and I will start fitting it into my schedule. 8  3. Within the next month, I will make a list of stress management options that work for me. 8  4. Within the next month, I will consistently plan ahead to prevent low blood sugars. 0  5. Within the next month, I will start adjusting my insulin doses on my own. 0  6. Within the next month, I will begin making changes to my diabetes management based on my daily blood sugars (eg - eating, activity and/or insulin). 7  7. Within the next month, I will begin making changes to my diabetes management to meet my overall goals (eg - eating, activity and/or insulin).  7

## 2022-05-26 ENCOUNTER — OFFICE VISIT (OUTPATIENT)
Dept: INTERNAL MEDICINE CLINIC | Age: 57
End: 2022-05-26
Payer: COMMERCIAL

## 2022-05-26 VITALS
BODY MASS INDEX: 25.71 KG/M2 | OXYGEN SATURATION: 98 % | RESPIRATION RATE: 20 BRPM | HEIGHT: 66 IN | HEART RATE: 74 BPM | WEIGHT: 160 LBS | DIASTOLIC BLOOD PRESSURE: 63 MMHG | SYSTOLIC BLOOD PRESSURE: 112 MMHG | TEMPERATURE: 98.1 F

## 2022-05-26 DIAGNOSIS — J01.00 ACUTE NON-RECURRENT MAXILLARY SINUSITIS: Primary | ICD-10-CM

## 2022-05-26 PROCEDURE — 99213 OFFICE O/P EST LOW 20 MIN: CPT | Performed by: INTERNAL MEDICINE

## 2022-05-26 RX ORDER — AZITHROMYCIN 250 MG/1
250 TABLET, FILM COATED ORAL SEE ADMIN INSTRUCTIONS
Qty: 6 TABLET | Refills: 0 | Status: SHIPPED | OUTPATIENT
Start: 2022-05-26 | End: 2022-05-31

## 2022-05-26 NOTE — PROGRESS NOTES
HISTORY OF PRESENT ILLNESS  Cecily Morris is a 62 y.o. female. HPI   Started last week (5/17) with drainage and headaches, over the weekend with sore throat. Occ chills. Upset stomach with loose stools. Last week with coughing but not this week. Sinus congestion, rhinorrhea. Taking Mucinex, Allegra, saline ns. COVID test last week and last night both negative. Current Outpatient Medications   Medication Instructions    ALPRAZolam (XANAX) 0.25-0.5 mg, Oral, 2 TIMES DAILY AS NEEDED    amLODIPine (NORVASC) 5 mg tablet TAKE 1 TABLET BY MOUTH EVERY DAY    atorvastatin (LIPITOR) 10 mg, Oral, DAILY    chlorthalidone (HYGROTON) 25 mg tablet TAKE 1/2 TABLET BY MOUTH EVERY DAY    cholecalciferol, vitamin D3, (VITAMIN D3 PO) Oral    fexofenadine (ALLEGRA) 180 mg, Oral, DAILY    fluticasone (Flonase Sensimist) 27.5 mcg/actuation nasal spray 2 Sprays, Nasal, DAILY    lisinopriL (PRINIVIL, ZESTRIL) 40 mg tablet TAKE 1 TABLET BY MOUTH EVERY DAY    multivitamin (ONE A DAY) tablet 1 Tablet, Oral, DAILY    omega 3-dha-epa-fish oil (Fish Oil) 100-160-1,000 mg cap Oral       Visit Vitals  /63   Pulse 74   Temp 98.1 °F (36.7 °C) (Temporal)   Resp 20   Ht 5' 6\" (1.676 m)   Wt 160 lb (72.6 kg)   SpO2 98%   BMI 25.82 kg/m²     ROS  See above  Physical Exam  Vitals reviewed. Constitutional:       Appearance: Normal appearance. HENT:      Head: Normocephalic and atraumatic. Comments: Tenderness right maxillary and frontal sinsues. Right Ear: Tympanic membrane, ear canal and external ear normal.      Left Ear: Tympanic membrane and ear canal normal.      Nose: Congestion and rhinorrhea present. Mouth/Throat:      Pharynx: No oropharyngeal exudate. Comments: Mild erythema. Post nasal drainage  Neck:      Vascular: No carotid bruit. Cardiovascular:      Rate and Rhythm: Normal rate and regular rhythm. Pulses: Normal pulses. Heart sounds: Normal heart sounds.    Pulmonary:      Effort: Pulmonary effort is normal.      Breath sounds: Normal breath sounds. Musculoskeletal:      Cervical back: Neck supple. Lymphadenopathy:      Cervical: No cervical adenopathy. Neurological:      Mental Status: She is alert. ASSESSMENT and PLAN  Diagnoses and all orders for this visit:    1. Acute non-recurrent maxillary sinusitis  -     azithromycin (ZITHROMAX) 250 mg tablet; Take 1 Tablet by mouth See Admin Instructions for 5 days. Continue otc meds.

## 2022-05-26 NOTE — LETTER
NOTIFICATION RETURN TO WORK / SCHOOL    5/26/2022 1:25 PM    Ms. Klaudia Rivera  [address] [place] [id number]      To Whom It May Concern:    Klaudia Rivera is currently under the care of Leona Lyle.. She will return to work/school on: May 31, 2022. Please excuse from work May 25, 2022 through May 27, 2022 due to illness. If there are questions or concerns please have the patient contact our office.         Sincerely,      Fabiola Moreno MD

## 2022-07-20 ENCOUNTER — OFFICE VISIT (OUTPATIENT)
Dept: URGENT CARE | Age: 57
End: 2022-07-20
Payer: COMMERCIAL

## 2022-07-20 VITALS
OXYGEN SATURATION: 100 % | WEIGHT: 160 LBS | HEART RATE: 90 BPM | TEMPERATURE: 98.8 F | SYSTOLIC BLOOD PRESSURE: 122 MMHG | DIASTOLIC BLOOD PRESSURE: 72 MMHG | HEIGHT: 66 IN | BODY MASS INDEX: 25.71 KG/M2 | RESPIRATION RATE: 16 BRPM

## 2022-07-20 DIAGNOSIS — J06.9 VIRAL URI WITH COUGH: Primary | ICD-10-CM

## 2022-07-20 PROCEDURE — 99213 OFFICE O/P EST LOW 20 MIN: CPT | Performed by: FAMILY MEDICINE

## 2022-07-20 NOTE — PROGRESS NOTES
Cold Symptoms  The history is provided by the Patient. This is a new problem. The current episode started more than 2 days ago. The problem occurs constantly. Patient reports a subjective fever - was not measured. Associated symptoms include ear congestion, ear pain, headaches, rhinorrhea and sore throat. Pertinent negatives include no chills, no shortness of breath and no wheezing. Treatments tried: pian mucinex and tylenol. Risk factors: no covid exposure= home covid negative yesterday. She is not a smoker. Her past medical history does not include asthma.       Past Medical History:   Diagnosis Date    Anxiety     Calculus of kidney 2014    seen on ultrasound - no sxs ever    Environmental allergies     GERD (gastroesophageal reflux disease)     hiatal hernia    Hypertension         Past Surgical History:   Procedure Laterality Date    HX COLONOSCOPY  12/2011    Eran, f/u 10 years    NEUROLOGICAL PROCEDURE UNLISTED  2001    lumbar hodge., redo w/L5-S1 fusion (11/02-Ken)         Family History   Problem Relation Age of Onset    Cancer Father         prostate    Hypertension Father     Diabetes Father         Social History     Socioeconomic History    Marital status:      Spouse name: Not on file    Number of children: Not on file    Years of education: Not on file    Highest education level: Not on file   Occupational History    Not on file   Tobacco Use    Smoking status: Never    Smokeless tobacco: Never   Vaping Use    Vaping Use: Never used   Substance and Sexual Activity    Alcohol use: No     Alcohol/week: 0.0 standard drinks    Drug use: No    Sexual activity: Yes     Partners: Male   Other Topics Concern    Not on file   Social History Narrative    Not on file     Social Determinants of Health     Financial Resource Strain: Not on file   Food Insecurity: Not on file   Transportation Needs: Not on file   Physical Activity: Not on file   Stress: Not on file   Social Connections: Not on file Intimate Partner Violence: Not on file   Housing Stability: Not on file                ALLERGIES: Naprosyn [naproxen], Hydrochlorothiazide, Macrobid [nitrofurantoin monohyd/m-cryst], and Sulfa (sulfonamide antibiotics)    Review of Systems   Constitutional:  Negative for chills and fever. HENT:  Positive for congestion, ear pain, postnasal drip, rhinorrhea and sore throat. Respiratory:  Positive for cough. Negative for chest tightness, shortness of breath and wheezing. Neurological:  Positive for headaches. All other systems reviewed and are negative. Vitals:    07/20/22 0832   BP: 122/72   Pulse: 90   Resp: 16   Temp: 98.8 °F (37.1 °C)   SpO2: 100%   Weight: 160 lb (72.6 kg)   Height: 5' 6\" (1.676 m)       Physical Exam  Vitals and nursing note reviewed. Constitutional:       General: She is not in acute distress. HENT:      Right Ear: Tympanic membrane and ear canal normal.      Left Ear: Tympanic membrane and ear canal normal.      Nose: Nose normal.      Mouth/Throat:      Pharynx: No oropharyngeal exudate or posterior oropharyngeal erythema. Eyes:      General:         Right eye: No discharge. Left eye: No discharge. Conjunctiva/sclera: Conjunctivae normal.   Pulmonary:      Effort: Pulmonary effort is normal. No respiratory distress. Breath sounds: Normal breath sounds. No wheezing or rales. Musculoskeletal:      Cervical back: Neck supple. Lymphadenopathy:      Cervical: No cervical adenopathy. Skin:     Findings: No rash. MDM    Procedures      ICD-10-CM ICD-9-CM    1. Viral URI with cough  J06.9 465.9         Continue mucinex  Add Dayqill- Flonase and allegra  May also take Motrin  Sinus rinse several times/ day       No orders of the defined types were placed in this encounter. No results found for any visits on 07/20/22. The patients condition was discussed with the patient and they understand. The patient is to follow up with primary care doctor.   If signs and symptoms become worse the pt is to go to the ER. The patient is to take medications as prescribed.

## 2022-07-20 NOTE — PATIENT INSTRUCTIONS
Continue mucinex  Add Dayqill- Flonase and allegra  May also take Motrin  Sinus rinse several times/ day

## 2022-07-22 ENCOUNTER — TELEPHONE (OUTPATIENT)
Dept: INTERNAL MEDICINE CLINIC | Age: 57
End: 2022-07-22

## 2022-07-22 NOTE — TELEPHONE ENCOUNTER
Spoke w/pt. Told pt she can take Mucinex for her cough as it does not interfere w/blood pressure medication. Told pt to call back on Monday if the cough is not better. Pt verbalizes understanding.

## 2022-07-22 NOTE — TELEPHONE ENCOUNTER
----- Message from Olya Nicholsonbus sent at 7/22/2022  9:02 AM EDT -----  Subject: Message to Provider    QUESTIONS  Information for Provider? On blood pressure and cholesterol medication. Tested positive for Covid yesterday and needs to know what she can take   for her cough. Please call her ASAP.  ---------------------------------------------------------------------------  --------------  Jonathan ARRIAZA  [phone]; OK to leave message on voicemail  ---------------------------------------------------------------------------  --------------  SCRIPT ANSWERS  Relationship to Patient?  Self

## 2022-07-25 RX ORDER — CHLORTHALIDONE 25 MG/1
TABLET ORAL
Qty: 45 TABLET | Refills: 3 | Status: SHIPPED | OUTPATIENT
Start: 2022-07-25

## 2022-07-25 RX ORDER — LISINOPRIL 40 MG/1
TABLET ORAL
Qty: 90 TABLET | Refills: 3 | Status: SHIPPED | OUTPATIENT
Start: 2022-07-25

## 2022-07-25 RX ORDER — AMLODIPINE BESYLATE 5 MG/1
TABLET ORAL
Qty: 90 TABLET | Refills: 3 | Status: SHIPPED | OUTPATIENT
Start: 2022-07-25

## 2022-08-31 NOTE — PROGRESS NOTES
Subjective:   62 y.o. female for Well Woman Check. Her gyne and breast care is done elsewhere by her Ob-Gyne physician. Patient Active Problem List    Diagnosis Date Noted    Low back pain 05/29/2018    Right sided sciatica 05/29/2018    Status post lumbar spinal fusion 05/29/2018    Cervicalgia 01/12/2018    Degenerative disc disease, cervical 01/12/2018    Perimenopausal disorder 10/30/2017    Cervical radiculitis 07/07/2017    Multinodular goiter 12/19/2016    Benign neoplasm of muscle 10/26/2016    Acute vaginitis 08/25/2016    Increased frequency of urination 10/23/2015    Nephrolithiasis 04/15/2015    HTN (hypertension) 12/22/2011    Allergic rhinitis 12/22/2011     Current Outpatient Medications   Medication Sig Dispense Refill    amLODIPine (NORVASC) 5 mg tablet TAKE 1 TABLET BY MOUTH EVERY DAY 90 Tablet 3    chlorthalidone (HYGROTON) 25 mg tablet TAKE 1/2 TABLET BY MOUTH EVERY DAY 45 Tablet 3    lisinopriL (PRINIVIL, ZESTRIL) 40 mg tablet TAKE 1 TABLET BY MOUTH EVERY DAY 90 Tablet 3    atorvastatin (LIPITOR) 10 mg tablet Take 1 Tablet by mouth daily. 90 Tablet 3    fexofenadine (ALLEGRA) 180 mg tablet Take 180 mg by mouth daily. fluticasone (Flonase Sensimist) 27.5 mcg/actuation nasal spray 2 Sprays by Nasal route daily. omega 3-dha-epa-fish oil (Fish Oil) 100-160-1,000 mg cap Take  by mouth.      multivitamin (ONE A DAY) tablet Take 1 Tab by mouth daily. cholecalciferol, vitamin D3, (VITAMIN D3 PO) Take  by mouth. ALPRAZolam (XANAX) 0.5 mg tablet Take 0.5-1 Tabs by mouth two (2) times daily as needed for Anxiety. Max Daily Amount: 1 mg.  (Patient not taking: Reported on 9/1/2022) 30 Tab 1     Allergies   Allergen Reactions    Naprosyn [Naproxen] Other (comments)     GI distress and loose stools    Hydrochlorothiazide Other (comments)     Lightheaded/dizzy    Macrobid [Nitrofurantoin Monohyd/M-Cryst] Nausea Only    Sulfa (Sulfonamide Antibiotics) Unknown (comments)     Past Medical History:   Diagnosis Date    Anxiety     Calculus of kidney 2014    seen on ultrasound - no sxs ever    Environmental allergies     GERD (gastroesophageal reflux disease)     hiatal hernia    Hypertension      Past Surgical History:   Procedure Laterality Date    HX COLONOSCOPY  12/2011    [facility], f/u 10 years    NEUROLOGICAL PROCEDURE UNLISTED  2001    lumbar hodge., redo w/L5-S1 fusion (11/02-Ken)     Family History   Problem Relation Age of Onset    Cancer Father         prostate    Hypertension Father     Diabetes Father      Social History     Tobacco Use    Smoking status: Never    Smokeless tobacco: Never   Substance Use Topics    Alcohol use: No     Alcohol/week: 0.0 standard drinks        Lab Results   Component Value Date/Time    WBC 5.2 07/17/2020 12:00 AM    HGB 12.5 07/17/2020 12:00 AM    HCT 37.7 07/17/2020 12:00 AM    PLATELET 914 44/79/7463 12:00 AM    MCV 88 07/17/2020 12:00 AM     Lab Results   Component Value Date/Time    Hemoglobin A1c 6.3 (H) 04/13/2022 08:00 AM    Hemoglobin A1c 6.5 (H) 01/13/2022 12:05 PM    Hemoglobin A1c 6.5 (H) 10/14/2021 07:48 AM    Hemoglobin A1c, External 0.0 04/20/2021 12:00 AM    Glucose 117 (H) 04/13/2022 08:00 AM    Microalb/Creat ratio (ug/mg creat.) 7 04/13/2022 08:00 AM    LDL, calculated 73 04/13/2022 08:00 AM    LDL, calculated 123 (H) 07/17/2020 12:00 AM    Creatinine (POC) 0.6 03/29/2011 07:50 AM    Creatinine 0.81 04/13/2022 08:00 AM      Lab Results   Component Value Date/Time    Cholesterol, total 179 04/13/2022 08:00 AM    HDL Cholesterol 97 04/13/2022 08:00 AM    LDL, calculated 73 04/13/2022 08:00 AM    LDL, calculated 123 (H) 07/17/2020 12:00 AM    LDL-C, External 0 04/20/2021 12:00 AM    Triglyceride 42 04/13/2022 08:00 AM     Lab Results   Component Value Date/Time    ALT (SGPT) 19 04/13/2022 08:00 AM    Alk.  phosphatase 81 04/13/2022 08:00 AM    Bilirubin, total 0.5 04/13/2022 08:00 AM    Albumin 5.0 (H) 04/13/2022 08:00 AM    Protein, total 7.2 04/13/2022 08:00 AM    PLATELET 362 55/24/8126 12:00 AM       Lab Results   Component Value Date/Time    GFR est non-AA 77 01/13/2022 12:05 PM    GFRNA, POC >60 03/29/2011 07:50 AM    GFR est AA 89 01/13/2022 12:05 PM    GFRAA, POC >60 03/29/2011 07:50 AM    Creatinine 0.81 04/13/2022 08:00 AM    Creatinine (POC) 0.6 03/29/2011 07:50 AM    BUN 17 04/13/2022 08:00 AM    Sodium 144 04/13/2022 08:00 AM    Potassium 4.3 04/13/2022 08:00 AM    Chloride 102 04/13/2022 08:00 AM    CO2 24 04/13/2022 08:00 AM    PTH, Intact 34 12/19/2016 09:57 AM     Lab Results   Component Value Date/Time    TSH 2.830 06/25/2021 09:18 AM         Specific concerns today:   BP at home running 110-120/70-80s  Eye exam every December - Dr. Mira Hurst). '  Mammogram and pap scheduled for November. .    Review of Systems  Constitutional: negative  Eyes: negative  Ears, nose, mouth, throat, and face: negative  Respiratory: negative  Cardiovascular: negative  Gastrointestinal: negative except for GERD controlled with diet alone  Genitourinary:negative except increased frequency of urination. Integument/breast: negative  Hematologic/lymphatic: negative  Musculoskeletal:negative except for low back pain, seeing ortho - had been seeing Dr. Yulia Jones, now seeing Dr. Vonda Yi with [facility]  Neurological: negative  Behavioral/Psych: negative  Endocrine: negative except for continued hot flashes. Allergic/Immunologic: negative except for seasonal allergies    Objective:   Blood pressure 131/80, pulse 70, temperature 98 °F (36.7 °C), temperature source Temporal, resp. rate 18, height 5' 6\" (1.676 m), weight 158 lb 9.6 oz (71.9 kg), SpO2 95 %.    Physical Examination:   General appearance - alert, well appearing, and in no distress and oriented to person, place, and time  Mental status - alert, oriented to person, place, and time, normal mood, behavior, speech, dress, motor activity, and thought processes  Eyes - pupils equal and reactive, extraocular eye movements intact  Ears - bilateral TM's and external ear canals normal  Nose - normal and patent, no erythema, discharge or polyps  Mouth - mucous membranes moist, pharynx normal without lesions  Neck - supple, no significant adenopathy, carotids upstroke normal bilaterally, no bruits, thyroid exam: thyroid is normal in size without nodules or tenderness  Lymphatics - no palpable lymphadenopathy, no hepatosplenomegaly  Chest - clear to auscultation, no wheezes, rales or rhonchi, symmetric air entry  Heart - normal rate, regular rhythm, normal S1, S2, no murmurs, rubs, clicks or gallops  Abdomen - soft, nontender, nondistended, no masses or organomegaly  bowel sounds normal  Breasts - breasts appear normal, no suspicious masses, no skin or nipple changes or axillary nodes  Back exam - full range of motion, no tenderness, palpable spasm or pain on motion  Neurological - alert, oriented, normal speech, no focal findings or movement disorder noted  Musculoskeletal - no joint tenderness, deformity or swelling  Extremities - peripheral pulses normal, no pedal edema, no clubbing or cyanosis  Skin - normal coloration and turgor, no rashes, no suspicious skin lesions noted     Assessment/Plan:   58yo female  here for PE  call if any problems   Diagnoses and all orders for this visit:    1. Routine general medical examination at a health care facility  -     METABOLIC PANEL, COMPREHENSIVE; Future  -     LIPID PANEL; Future  -     HEMOGLOBIN A1C WITH EAG; Future  -     CBC W/O DIFF; Future  -     TSH 3RD GENERATION; Future  -     VITAMIN D, 25 HYDROXY; Future    2. Controlled type 2 diabetes mellitus without complication, without long-term current use of insulin ([address] - controlled with diet alone. Repeat labs  -     HEMOGLOBIN A1C WITH EAG; Future    3. Essential hypertension - controlled with current regimen LIsinopril 40mg every day, Chlorthalidone 12.5mg every day and Amlodipine 5mg every day.       4. Pure hypercholesterolemia - controlled on Atorvastatin 10mg every day. Repeat labs. 5. Gastroesophageal reflux disease, unspecified whether esophagitis present - controlled with diet alone    6. Vitamin D deficiency - controlled with supplements. Continue same  -     VITAMIN D, 25 HYDROXY; Future    7. Anxiety - well controlled. Has not used Xanax in a long time.       8. Urinary frequency - UA NEG  -     AMB POC URINALYSIS DIP STICK AUTO W/O MICRO

## 2022-09-01 ENCOUNTER — OFFICE VISIT (OUTPATIENT)
Dept: INTERNAL MEDICINE CLINIC | Age: 57
End: 2022-09-01
Payer: COMMERCIAL

## 2022-09-01 VITALS
BODY MASS INDEX: 25.49 KG/M2 | HEIGHT: 66 IN | RESPIRATION RATE: 18 BRPM | TEMPERATURE: 98 F | HEART RATE: 70 BPM | OXYGEN SATURATION: 95 % | WEIGHT: 158.6 LBS | SYSTOLIC BLOOD PRESSURE: 131 MMHG | DIASTOLIC BLOOD PRESSURE: 80 MMHG

## 2022-09-01 DIAGNOSIS — R35.0 URINARY FREQUENCY: ICD-10-CM

## 2022-09-01 DIAGNOSIS — Z00.00 ROUTINE GENERAL MEDICAL EXAMINATION AT A HEALTH CARE FACILITY: Primary | ICD-10-CM

## 2022-09-01 DIAGNOSIS — I10 ESSENTIAL HYPERTENSION: ICD-10-CM

## 2022-09-01 DIAGNOSIS — E55.9 VITAMIN D DEFICIENCY: ICD-10-CM

## 2022-09-01 DIAGNOSIS — K21.9 GASTROESOPHAGEAL REFLUX DISEASE, UNSPECIFIED WHETHER ESOPHAGITIS PRESENT: ICD-10-CM

## 2022-09-01 DIAGNOSIS — F41.9 ANXIETY: ICD-10-CM

## 2022-09-01 DIAGNOSIS — E78.00 PURE HYPERCHOLESTEROLEMIA: ICD-10-CM

## 2022-09-01 DIAGNOSIS — E11.9 CONTROLLED TYPE 2 DIABETES MELLITUS WITHOUT COMPLICATION, WITHOUT LONG-TERM CURRENT USE OF INSULIN (HCC): ICD-10-CM

## 2022-09-01 LAB
BILIRUB UR QL STRIP: NEGATIVE
GLUCOSE UR-MCNC: NEGATIVE MG/DL
KETONES P FAST UR STRIP-MCNC: NEGATIVE MG/DL
PH UR STRIP: 5.5 [PH] (ref 4.6–8)
PROT UR QL STRIP: NEGATIVE
SP GR UR STRIP: 1.01 (ref 1–1.03)
UA UROBILINOGEN AMB POC: NORMAL (ref 0.2–1)
URINALYSIS CLARITY POC: CLEAR
URINALYSIS COLOR POC: YELLOW
URINE BLOOD POC: NORMAL
URINE LEUKOCYTES POC: NEGATIVE
URINE NITRITES POC: NEGATIVE

## 2022-09-01 PROCEDURE — 81003 URINALYSIS AUTO W/O SCOPE: CPT | Performed by: INTERNAL MEDICINE

## 2022-09-01 PROCEDURE — 99396 PREV VISIT EST AGE 40-64: CPT | Performed by: INTERNAL MEDICINE

## 2022-09-01 NOTE — PROGRESS NOTES
1. Have you been to the ER, urgent care clinic since your last visit? Hospitalized since your last visit? No    2. Have you seen or consulted any other health care providers outside of the [address] [place] [address] since your last visit? Include any pap smears or colon screening.  No    Chief Complaint   Patient presents with    Annual Exam

## 2022-09-02 LAB
25(OH)D3+25(OH)D2 SERPL-MCNC: 71.1 NG/ML (ref 30–100)
ALBUMIN SERPL-MCNC: 4.9 G/DL (ref 3.8–4.9)
ALBUMIN/GLOB SERPL: 2 {RATIO} (ref 1.2–2.2)
ALP SERPL-CCNC: 78 IU/L (ref 44–121)
ALT SERPL-CCNC: 18 IU/L (ref 0–32)
AST SERPL-CCNC: 23 IU/L (ref 0–40)
BILIRUB SERPL-MCNC: 0.6 MG/DL (ref 0–1.2)
BUN SERPL-MCNC: 19 MG/DL (ref 6–24)
BUN/CREAT SERPL: 25 (ref 9–23)
CALCIUM SERPL-MCNC: 9.8 MG/DL (ref 8.7–10.2)
CHLORIDE SERPL-SCNC: 98 MMOL/L (ref 96–106)
CHOLEST SERPL-MCNC: 174 MG/DL (ref 100–199)
CO2 SERPL-SCNC: 25 MMOL/L (ref 20–29)
CREAT SERPL-MCNC: 0.77 MG/DL (ref 0.57–1)
EGFR: 90 ML/MIN/1.73
ERYTHROCYTE [DISTWIDTH] IN BLOOD BY AUTOMATED COUNT: 13.9 % (ref 11.7–15.4)
EST. AVERAGE GLUCOSE BLD GHB EST-MCNC: 140 MG/DL
GLOBULIN SER CALC-MCNC: 2.5 G/DL (ref 1.5–4.5)
GLUCOSE SERPL-MCNC: 106 MG/DL (ref 65–99)
HBA1C MFR BLD: 6.5 % (ref 4.8–5.6)
HCT VFR BLD AUTO: 36.9 % (ref 34–46.6)
HDLC SERPL-MCNC: 93 MG/DL
HGB BLD-MCNC: 12 G/DL (ref 11.1–15.9)
IMP & REVIEW OF LAB RESULTS: NORMAL
LDLC SERPL CALC-MCNC: 72 MG/DL (ref 0–99)
MCH RBC QN AUTO: 29.4 PG (ref 26.6–33)
MCHC RBC AUTO-ENTMCNC: 32.5 G/DL (ref 31.5–35.7)
MCV RBC AUTO: 90 FL (ref 79–97)
PLATELET # BLD AUTO: 298 X10E3/UL (ref 150–450)
POTASSIUM SERPL-SCNC: 4.2 MMOL/L (ref 3.5–5.2)
PROT SERPL-MCNC: 7.4 G/DL (ref 6–8.5)
RBC # BLD AUTO: 4.08 X10E6/UL (ref 3.77–5.28)
SODIUM SERPL-SCNC: 139 MMOL/L (ref 134–144)
TRIGL SERPL-MCNC: 40 MG/DL (ref 0–149)
TSH SERPL DL<=0.005 MIU/L-ACNC: 1.15 UIU/ML (ref 0.45–4.5)
VLDLC SERPL CALC-MCNC: 9 MG/DL (ref 5–40)
WBC # BLD AUTO: 6.4 X10E3/UL (ref 3.4–10.8)

## 2022-10-03 ENCOUNTER — OFFICE VISIT (OUTPATIENT)
Dept: INTERNAL MEDICINE CLINIC | Age: 57
End: 2022-10-03
Payer: COMMERCIAL

## 2022-10-03 VITALS
DIASTOLIC BLOOD PRESSURE: 62 MMHG | RESPIRATION RATE: 20 BRPM | BODY MASS INDEX: 25.91 KG/M2 | SYSTOLIC BLOOD PRESSURE: 109 MMHG | HEIGHT: 66 IN | HEART RATE: 71 BPM | WEIGHT: 161.2 LBS | OXYGEN SATURATION: 100 % | TEMPERATURE: 98.1 F

## 2022-10-03 DIAGNOSIS — H10.13 ALLERGIC CONJUNCTIVITIS AND RHINITIS, BILATERAL: ICD-10-CM

## 2022-10-03 DIAGNOSIS — J01.10 ACUTE NON-RECURRENT FRONTAL SINUSITIS: Primary | ICD-10-CM

## 2022-10-03 DIAGNOSIS — J30.9 ALLERGIC CONJUNCTIVITIS AND RHINITIS, BILATERAL: ICD-10-CM

## 2022-10-03 PROCEDURE — 99214 OFFICE O/P EST MOD 30 MIN: CPT | Performed by: INTERNAL MEDICINE

## 2022-10-03 RX ORDER — AZITHROMYCIN 250 MG/1
250 TABLET, FILM COATED ORAL SEE ADMIN INSTRUCTIONS
Qty: 6 TABLET | Refills: 0 | Status: SHIPPED | OUTPATIENT
Start: 2022-10-03 | End: 2022-10-08

## 2022-10-03 NOTE — PROGRESS NOTES
HISTORY OF PRESENT ILLNESS  Matt Hernandez is a 62 y.o. female. HPI  Sinus pressure behind eyes into left ear with some balance issues for 1 week. For 1 week eyes crusty when awakening. Right eye swollen x 2 days, itchy and sticky feeling. No rhinorrhea.  + post nasal drainage, mild dry cough. No fever or chills. Using Flonase Sensimist and Saline NS. Also taking Allegra. Current Outpatient Medications:     amLODIPine (NORVASC) 5 mg tablet, TAKE 1 TABLET BY MOUTH EVERY DAY, Disp: 90 Tablet, Rfl: 3    chlorthalidone (HYGROTON) 25 mg tablet, TAKE 1/2 TABLET BY MOUTH EVERY DAY, Disp: 45 Tablet, Rfl: 3    lisinopriL (PRINIVIL, ZESTRIL) 40 mg tablet, TAKE 1 TABLET BY MOUTH EVERY DAY, Disp: 90 Tablet, Rfl: 3    atorvastatin (LIPITOR) 10 mg tablet, Take 1 Tablet by mouth daily. , Disp: 90 Tablet, Rfl: 3    fexofenadine (ALLEGRA) 180 mg tablet, Take 180 mg by mouth daily. , Disp: , Rfl:     fluticasone (Flonase Sensimist) 27.5 mcg/actuation nasal spray, 2 Sprays by Nasal route daily. , Disp: , Rfl:     omega 3-dha-epa-fish oil (Fish Oil) 100-160-1,000 mg cap, Take  by mouth., Disp: , Rfl:     multivitamin (ONE A DAY) tablet, Take 1 Tab by mouth daily. , Disp: , Rfl:     cholecalciferol, vitamin D3, (VITAMIN D3 PO), Take  by mouth., Disp: , Rfl:     ALPRAZolam (XANAX) 0.5 mg tablet, Take 0.5-1 Tabs by mouth two (2) times daily as needed for Anxiety. Max Daily Amount: 1 mg. (Patient not taking: No sig reported), Disp: 30 Tab, Rfl: 1    Visit Vitals  /62 (BP 1 Location: Left upper arm, BP Patient Position: Sitting, BP Cuff Size: Adult)   Pulse 71   Temp 98.1 °F (36.7 °C)   Resp 20   Ht 5' 6\" (1.676 m)   Wt 161 lb 3.2 oz (73.1 kg)   SpO2 100%   BMI 26.02 kg/m²       ROS  See above  Physical Exam  Vitals reviewed. Constitutional:       Appearance: Normal appearance. HENT:      Head: Normocephalic and atraumatic.       Comments: Tenderness bilat frontal and maxillary sinuses     Right Ear: Tympanic membrane, ear canal and external ear normal.      Left Ear: Tympanic membrane, ear canal and external ear normal.      Nose: Congestion and rhinorrhea (clear) present. Eyes:      Comments: Eyes with clear drainage, mild erythema, swelling of eyelids. Allergic shiners. Pulmonary:      Effort: Pulmonary effort is normal.      Breath sounds: Normal breath sounds. Neurological:      Mental Status: She is alert. ASSESSMENT and PLAN  Diagnoses and all orders for this visit:    1. Acute non-recurrent frontal sinusitis  -     azithromycin (ZITHROMAX) 250 mg tablet; Take 1 Tablet by mouth See Admin Instructions for 5 days. (ZPACK)   Add mucinex 1200mg bid   Continue FLonase, Saline NS and Allegra    2. Allergic conjunctivitis and rhinitis, bilateral   Continue Flonase and Allegra   Add Zaditor eye gtt.       Other orders

## 2022-10-27 ENCOUNTER — TRANSCRIBE ORDER (OUTPATIENT)
Dept: SCHEDULING | Age: 57
End: 2022-10-27

## 2022-10-27 DIAGNOSIS — K21.9 ESOPHAGEAL REFLUX: ICD-10-CM

## 2022-10-27 DIAGNOSIS — R10.32 ABDOMINAL PAIN, LEFT LOWER QUADRANT: Primary | ICD-10-CM

## 2022-10-27 DIAGNOSIS — K57.90 DIVERTICULOSIS: ICD-10-CM

## 2022-10-27 DIAGNOSIS — Z87.11 PERSONAL HISTORY OF PEPTIC ULCER DISEASE: ICD-10-CM

## 2022-10-31 ENCOUNTER — HOSPITAL ENCOUNTER (OUTPATIENT)
Dept: CT IMAGING | Age: 57
Discharge: HOME | End: 2022-10-31
Attending: PHYSICIAN ASSISTANT
Payer: COMMERCIAL

## 2022-10-31 DIAGNOSIS — K21.9 ESOPHAGEAL REFLUX: ICD-10-CM

## 2022-10-31 DIAGNOSIS — Z87.11 PERSONAL HISTORY OF PEPTIC ULCER DISEASE: ICD-10-CM

## 2022-10-31 DIAGNOSIS — R10.32 ABDOMINAL PAIN, LEFT LOWER QUADRANT: ICD-10-CM

## 2022-10-31 DIAGNOSIS — K57.90 DIVERTICULOSIS: ICD-10-CM

## 2022-10-31 PROCEDURE — 74011000636 HC RX REV CODE- 636: Performed by: PHYSICIAN ASSISTANT

## 2022-10-31 PROCEDURE — 74177 CT ABD & PELVIS W/CONTRAST: CPT

## 2022-10-31 RX ADMIN — IOPAMIDOL 100 ML: 755 INJECTION, SOLUTION INTRAVENOUS at 08:24

## 2022-11-14 ENCOUNTER — OFFICE VISIT (OUTPATIENT)
Dept: INTERNAL MEDICINE CLINIC | Age: 57
End: 2022-11-14
Payer: COMMERCIAL

## 2022-11-14 VITALS
OXYGEN SATURATION: 100 % | WEIGHT: 159.8 LBS | TEMPERATURE: 98.2 F | RESPIRATION RATE: 20 BRPM | HEART RATE: 74 BPM | BODY MASS INDEX: 25.68 KG/M2 | SYSTOLIC BLOOD PRESSURE: 114 MMHG | HEIGHT: 66 IN | DIASTOLIC BLOOD PRESSURE: 64 MMHG

## 2022-11-14 DIAGNOSIS — Z23 ENCOUNTER FOR IMMUNIZATION: Primary | ICD-10-CM

## 2022-11-14 PROBLEM — E21.5 DISORDER OF PARATHYROID GLAND (HCC): Status: ACTIVE | Noted: 2022-11-14

## 2022-11-14 PROBLEM — R00.2 PALPITATIONS: Status: ACTIVE | Noted: 2022-11-14

## 2022-11-14 PROCEDURE — 90686 IIV4 VACC NO PRSV 0.5 ML IM: CPT | Performed by: NURSE PRACTITIONER

## 2022-11-14 PROCEDURE — 90471 IMMUNIZATION ADMIN: CPT | Performed by: NURSE PRACTITIONER

## 2022-11-14 PROCEDURE — 99212 OFFICE O/P EST SF 10 MIN: CPT | Performed by: NURSE PRACTITIONER

## 2022-11-14 NOTE — PROGRESS NOTES
Patient here for flu shot. No chief complaint on file. Vitals:    11/14/22 0730   Temp: 98.2 °F (36.8 °C)   TempSrc: Oral   Weight: 159 lb 12.8 oz (72.5 kg)       Current Outpatient Medications on File Prior to Visit   Medication Sig Dispense Refill    amLODIPine (NORVASC) 5 mg tablet TAKE 1 TABLET BY MOUTH EVERY DAY 90 Tablet 3    chlorthalidone (HYGROTON) 25 mg tablet TAKE 1/2 TABLET BY MOUTH EVERY DAY 45 Tablet 3    lisinopriL (PRINIVIL, ZESTRIL) 40 mg tablet TAKE 1 TABLET BY MOUTH EVERY DAY 90 Tablet 3    atorvastatin (LIPITOR) 10 mg tablet Take 1 Tablet by mouth daily. 90 Tablet 3    fexofenadine (ALLEGRA) 180 mg tablet Take 180 mg by mouth daily. fluticasone (Flonase Sensimist) 27.5 mcg/actuation nasal spray 2 Sprays by Nasal route daily. ALPRAZolam (XANAX) 0.5 mg tablet Take 0.5-1 Tabs by mouth two (2) times daily as needed for Anxiety. Max Daily Amount: 1 mg. 30 Tab 1    omega 3-dha-epa-fish oil (Fish Oil) 100-160-1,000 mg cap Take  by mouth.      multivitamin (ONE A DAY) tablet Take 1 Tab by mouth daily. cholecalciferol, vitamin D3, (VITAMIN D3 PO) Take  by mouth. No current facility-administered medications on file prior to visit. Health Maintenance Due   Topic    Pneumococcal 0-64 years (1 - PCV)    Eye Exam Retinal or Dilated     Hepatitis B Vaccine (1 of 3 - Risk 3-dose series)    COVID-19 Vaccine (5 - Booster for [facility] series)    Flu Vaccine (1)       1. \"Have you been to the ER, urgent care clinic since your last visit? Hospitalized since your last visit? \" No    2. \"Have you seen or consulted any other health care providers outside of the [address] [place] [address] since your last visit? \" No     3. For patients aged 39-70: Has the patient had a colonoscopy / FIT/ Cologuard? Yes - no Care Gap present      If the patient is female:    4. For patients aged 41-77: Has the patient had a mammogram within the past 2 years? Yes - no Care Gap present      5.  For patients aged 21-65: Has the patient had a pap smear?  Yes - no Care Gap present

## 2022-11-14 NOTE — PROGRESS NOTES
HISTORY OF PRESENT ILLNESS  Theresa Lee is a 62 y.o. female. Patient here for flu shot. Patient reports she will be getting her booster for COVID in a few weeks. Had mammogram last week. No other concerns, changes in medication or medical history this visit. Review of Systems   All other systems reviewed and are negative. Physical Exam  Constitutional:       Appearance: Normal appearance. HENT:      Head: Normocephalic and atraumatic. Eyes:      Pupils: Pupils are equal, round, and reactive to light. Cardiovascular:      Rate and Rhythm: Normal rate. Pulmonary:      Effort: Pulmonary effort is normal.   Musculoskeletal:      Cervical back: Normal range of motion. Skin:     General: Skin is warm. Neurological:      General: No focal deficit present. Mental Status: She is alert. Psychiatric:         Mood and Affect: Mood normal.     ASSESSMENT and PLAN    ICD-10-CM ICD-9-CM    1. Encounter for immunization  Z23 V03.89 INFLUENZA, FLUARIX, FLULAVAL, FLUZONE (AGE 6 MO+), AFLURIA(AGE 3Y+) IM, PF, 0.5 ML        Encounter Diagnoses   Name Primary?  Encounter for immunization Yes     Orders Placed This Encounter    Influenza, FLUARIX, FLULAVAL, FLUZONE (age 10 mo+), AFLURIA (age 3y+) IM, PF, 0.5 mL   Patient instructed to continue precautions for several weeks like handwashing and wearing mask in public places.   Signed By: DIOGO Baldwin     November 14, 2022

## 2022-12-12 ENCOUNTER — TELEPHONE (OUTPATIENT)
Dept: INTERNAL MEDICINE CLINIC | Age: 57
End: 2022-12-12

## 2022-12-12 NOTE — TELEPHONE ENCOUNTER
----- Message from Felisa Laughlin sent at 12/12/2022  4:31 PM EST -----  Subject: Message to Provider    QUESTIONS  Information for Provider? Patient is calling because she is watching her   blood pressure and she is on three different meds and she says its to low   if you can give her a call back. ---------------------------------------------------------------------------  --------------  Tanna Pared[facility]  [phone]; OK to leave message on voicemail  ---------------------------------------------------------------------------  --------------  SCRIPT ANSWERS  Relationship to Patient?  Self

## 2022-12-19 ENCOUNTER — TELEPHONE (OUTPATIENT)
Dept: INTERNAL MEDICINE CLINIC | Age: 57
End: 2022-12-19

## 2022-12-19 NOTE — TELEPHONE ENCOUNTER
----- Message from Ham Hagen sent at 12/12/2022  4:31 PM EST -----  Subject: Message to Provider    QUESTIONS  Information for Provider? Patient is calling because she is watching her   blood pressure and she is on three different meds and she says its to low   if you can give her a call back. ---------------------------------------------------------------------------  --------------  Grady [facility]  [phone]; OK to leave message on voicemail  ---------------------------------------------------------------------------  --------------  SCRIPT ANSWERS  Relationship to Patient?  Self

## 2022-12-19 NOTE — TELEPHONE ENCOUNTER
Spoke with patient and she has been checking her BP and is doing fine and has an appointment in March.

## 2023-01-24 ENCOUNTER — OFFICE VISIT (OUTPATIENT)
Dept: URGENT CARE | Age: 58
End: 2023-01-24
Payer: COMMERCIAL

## 2023-01-24 VITALS
SYSTOLIC BLOOD PRESSURE: 119 MMHG | HEIGHT: 66 IN | HEART RATE: 77 BPM | BODY MASS INDEX: 25.65 KG/M2 | TEMPERATURE: 98 F | WEIGHT: 159.6 LBS | DIASTOLIC BLOOD PRESSURE: 68 MMHG | RESPIRATION RATE: 18 BRPM | OXYGEN SATURATION: 100 %

## 2023-01-24 DIAGNOSIS — J01.10 ACUTE NON-RECURRENT FRONTAL SINUSITIS: Primary | ICD-10-CM

## 2023-01-24 PROCEDURE — 99213 OFFICE O/P EST LOW 20 MIN: CPT | Performed by: INTERNAL MEDICINE

## 2023-01-24 PROCEDURE — 3074F SYST BP LT 130 MM HG: CPT | Performed by: INTERNAL MEDICINE

## 2023-01-24 PROCEDURE — 3078F DIAST BP <80 MM HG: CPT | Performed by: INTERNAL MEDICINE

## 2023-01-24 RX ORDER — DOXYCYCLINE 100 MG/1
100 CAPSULE ORAL 2 TIMES DAILY
Qty: 14 CAPSULE | Refills: 0 | Status: SHIPPED | OUTPATIENT
Start: 2023-01-24 | End: 2023-01-31

## 2023-01-24 NOTE — PROGRESS NOTES
Sore Throat   Associated symptoms include congestion, headaches and cough. Pertinent negatives include no diarrhea, no vomiting and no shortness of breath. Pt presents with complaints of sinus pressure/pain, nasal congestion, ear pressure, L ear pain, drainage, and sore throat for 6-7 days, gradually worsening. Denies fevers, chills, SOB, N/V/D. Using allergy med, flonase.      Past Medical History:   Diagnosis Date    Anxiety     Calculus of kidney 2014    seen on ultrasound - no sxs ever    Environmental allergies     GERD (gastroesophageal reflux disease)     hiatal hernia    Hypertension         Past Surgical History:   Procedure Laterality Date    HX COLONOSCOPY  12/2011    [facility], f/u 10 years    HI UNLISTED NEUROLOGICAL/NEUROMUSCULAR DX PX  2001    lumbar hodge., redo w/L5-S1 fusion (11/02-Ken)         Family History   Problem Relation Age of Onset    Cancer Father         prostate    Hypertension Father     Diabetes Father         Social History     Socioeconomic History    Marital status:      Spouse name: Not on file    Number of children: Not on file    Years of education: Not on file    Highest education level: Not on file   Occupational History    Not on file   Tobacco Use    Smoking status: Never    Smokeless tobacco: Never   Vaping Use    Vaping Use: Never used   Substance and Sexual Activity    Alcohol use: No     Alcohol/week: 0.0 standard drinks    Drug use: No    Sexual activity: Yes     Partners: Male   Other Topics Concern    Not on file   Social History Narrative    Not on file     Social Determinants of Health     Financial Resource Strain: Not on file   Food Insecurity: Not on file   Transportation Needs: Not on file   Physical Activity: Not on file   Stress: Not on file   Social Connections: Not on file   Intimate Partner Violence: Not on file   Housing Stability: Not on file                ALLERGIES: Naprosyn [naproxen], Amoxicillin, Hydrochlorothiazide, Macrobid [nitrofurantoin monohyd/m-cryst], and Sulfa (sulfonamide antibiotics)    Review of Systems   Constitutional:  Positive for fatigue. Negative for chills and fever. HENT:  Positive for congestion, sinus pressure, sinus pain and sore throat. Respiratory:  Positive for cough. Negative for shortness of breath and wheezing. Cardiovascular:  Negative for chest pain. Gastrointestinal:  Negative for abdominal pain, diarrhea, nausea and vomiting. Neurological:  Positive for headaches. Vitals:    01/24/23 1748   BP: 119/68   Pulse: 77   Resp: 18   Temp: 98 °F (36.7 °C)   SpO2: 100%   Weight: 159 lb 9.6 oz (72.4 kg)   Height: 5' 6\" (1.676 m)       Physical Exam  Constitutional:       General: She is not in acute distress. Appearance: Normal appearance. She is well-developed. She is not ill-appearing or toxic-appearing. HENT:      Head: Normocephalic and atraumatic. Right Ear: Tympanic membrane, ear canal and external ear normal.      Left Ear: Tympanic membrane, ear canal and external ear normal.      Nose: Congestion present. Mouth/Throat:      Mouth: Mucous membranes are moist.      Pharynx: Oropharynx is clear. Eyes:      Extraocular Movements: Extraocular movements intact. Conjunctiva/sclera: Conjunctivae normal.      Pupils: Pupils are equal, round, and reactive to light. Cardiovascular:      Rate and Rhythm: Normal rate and regular rhythm. Heart sounds: Normal heart sounds. Pulmonary:      Effort: Pulmonary effort is normal.      Breath sounds: Normal breath sounds. Musculoskeletal:      Cervical back: Normal range of motion and neck supple. Lymphadenopathy:      Cervical: No cervical adenopathy. Skin:     General: Skin is warm and dry. Neurological:      General: No focal deficit present. Mental Status: She is alert and oriented to person, place, and time. ICD-10-CM ICD-9-CM   1.  Acute non-recurrent frontal sinusitis  J01.10 461.1       Orders Placed This Encounter POCT COVID-19, SARS-COV-2, PCR     Order Specific Question:   Is this test for diagnosis or screening? Answer:   Diagnosis of ill patient     Order Specific Question:   Symptomatic for COVID-19 as defined by CDC? Answer:   Yes     Order Specific Question:   Date of Symptom Onset     Answer:   1/18/2023     Order Specific Question:   Hospitalized for COVID-19? Answer:   No     Order Specific Question:   Admitted to ICU for COVID-19? Answer:   No     Order Specific Question:   Employed in healthcare setting? Answer:   Unknown     Order Specific Question:   Resident in a congregate (group) care setting? Answer:   No     Order Specific Question:   Pregnant? Answer:   No     Order Specific Question:   Previously tested for COVID-19? Answer:   No    AMB POC AVERY INFLUENZA A/B TEST    AMB POC RAPID STREP A    doxycycline (MONODOX) 100 mg capsule     Sig: Take 1 Capsule by mouth two (2) times a day for 7 days. Dispense:  14 Capsule     Refill:  0        The patient is to follow up with PCP. If signs and symptoms become worse the pt is to go to the ER.      Ashok Fall NP       MDM    Procedures

## 2023-03-13 ENCOUNTER — VIRTUAL VISIT (OUTPATIENT)
Dept: INTERNAL MEDICINE CLINIC | Age: 58
End: 2023-03-13
Payer: COMMERCIAL

## 2023-03-13 ENCOUNTER — TELEPHONE (OUTPATIENT)
Dept: INTERNAL MEDICINE CLINIC | Age: 58
End: 2023-03-13

## 2023-03-13 DIAGNOSIS — E11.9 CONTROLLED TYPE 2 DIABETES MELLITUS WITHOUT COMPLICATION, WITHOUT LONG-TERM CURRENT USE OF INSULIN (HCC): Primary | ICD-10-CM

## 2023-03-13 DIAGNOSIS — E78.00 PURE HYPERCHOLESTEROLEMIA: ICD-10-CM

## 2023-03-13 DIAGNOSIS — I10 ESSENTIAL HYPERTENSION: ICD-10-CM

## 2023-03-13 PROCEDURE — 99214 OFFICE O/P EST MOD 30 MIN: CPT | Performed by: INTERNAL MEDICINE

## 2023-03-13 RX ORDER — CHLORHEXIDINE GLUCONATE 1.2 MG/ML
RINSE ORAL
COMMUNITY
Start: 2023-01-22

## 2023-03-13 NOTE — PROGRESS NOTES
Rodney Cuenca, who was evaluated through a synchronous (real-time) audio-video encounter, and/or her healthcare decision maker, is aware that it is a billable service, which includes applicable co-pays, with coverage as determined by her insurance carrier. She provided verbal consent to proceed and patient identification was verified. This visit was conducted pursuant to the emergency declaration under the [facility], [address] [place] [address] and the [employer] General Act. A caregiver was present when appropriate. Ability to conduct physical exam was limited. The patient was located at: Home: [phone]  The provider was located at: Home: Liban Calderon MD on 3/13/2023 at 9:37 AM    Subjective:     Rodney Cuenca is a 62 y.o. female seen for follow up of diabetes. She also has hypertension and hyperlipidemia. Diabetic Review of Systems - medication compliance: compliant all of the time, diabetic diet compliance: compliant most of the time - has improved her eating, home glucose monitoring: is not performed, further diabetic ROS: no polyuria or polydipsia, no chest pain, dyspnea or TIA's, no numbness, tingling or pain in extremities, no unusual visual symptoms, last eye exam approximately 3 months ago. Other symptoms and concerns:   Blood pressure well controlled at home. .  Mammogram completed in December with pap through [facility]. She had hysteroscopy through GYN for thickened lining of uterus in January. Every thing looked good. Known fibroids. Yearly follow up with allergist later this month. Allergies controlled on current meds.       Current Outpatient Medications   Medication Sig Dispense Refill    amLODIPine (NORVASC) 5 mg tablet TAKE 1 TABLET BY MOUTH EVERY DAY 90 Tablet 3    chlorthalidone (HYGROTON) 25 mg tablet TAKE 1/2 TABLET BY MOUTH EVERY DAY 45 Tablet 3 lisinopriL (PRINIVIL, ZESTRIL) 40 mg tablet TAKE 1 TABLET BY MOUTH EVERY DAY 90 Tablet 3    atorvastatin (LIPITOR) 10 mg tablet Take 1 Tablet by mouth daily. 90 Tablet 3    fexofenadine (ALLEGRA) 180 mg tablet Take 180 mg by mouth daily. fluticasone (Flonase Sensimist) 27.5 mcg/actuation nasal spray 2 Sprays by Nasal route daily. ALPRAZolam (XANAX) 0.5 mg tablet Take 0.5-1 Tabs by mouth two (2) times daily as needed for Anxiety. Max Daily Amount: 1 mg. (Patient not taking: Reported on 1/24/2023) 30 Tab 1    omega 3-dha-epa-fish oil (Fish Oil) 100-160-1,000 mg cap Take  by mouth.      multivitamin (ONE A DAY) tablet Take 1 Tab by mouth daily. cholecalciferol, vitamin D3, (VITAMIN D3 PO) Take  by mouth. Lab Results   Component Value Date/Time    Hemoglobin A1c 6.5 (H) 09/01/2022 10:56 AM    Hemoglobin A1c 6.3 (H) 04/13/2022 08:00 AM    Hemoglobin A1c 6.5 (H) 01/13/2022 12:05 PM    Hemoglobin A1c, External 0.0 04/20/2021 12:00 AM    Glucose 106 (H) 09/01/2022 10:56 AM    Microalb/Creat ratio (ug/mg creat.) 7 04/13/2022 08:00 AM    LDL, calculated 72 09/01/2022 10:56 AM    LDL, calculated 123 (H) 07/17/2020 12:00 AM    Creatinine (POC) 0.6 03/29/2011 07:50 AM    Creatinine 0.77 09/01/2022 10:56 AM      Lab Results   Component Value Date/Time    Cholesterol, total 174 09/01/2022 10:56 AM    HDL Cholesterol 93 09/01/2022 10:56 AM    LDL, calculated 72 09/01/2022 10:56 AM    LDL, calculated 123 (H) 07/17/2020 12:00 AM    LDL-C, External 0 04/20/2021 12:00 AM    Triglyceride 40 09/01/2022 10:56 AM     Lab Results   Component Value Date/Time    ALT (SGPT) 18 09/01/2022 10:56 AM    Alk.  phosphatase 78 09/01/2022 10:56 AM    Bilirubin, total 0.6 09/01/2022 10:56 AM    Albumin 4.9 09/01/2022 10:56 AM    Protein, total 7.4 09/01/2022 10:56 AM    PLATELET 146 85/62/8037 10:56 AM       Lab Results   Component Value Date/Time    GFR est non-AA 77 01/13/2022 12:05 PM    GFRNA, POC >60 03/29/2011 07:50 AM GFR est AA 89 01/13/2022 12:05 PM    GFRAA, POC >60 03/29/2011 07:50 AM    Creatinine 0.77 09/01/2022 10:56 AM    Creatinine (POC) 0.6 03/29/2011 07:50 AM    BUN 19 09/01/2022 10:56 AM    Sodium 139 09/01/2022 10:56 AM    Potassium 4.2 09/01/2022 10:56 AM    Chloride 98 09/01/2022 10:56 AM    CO2 25 09/01/2022 10:56 AM    PTH, Intact 34 12/19/2016 09:57 AM        Review of Systems  A comprehensive review of systems was negative except for that written in the HPI. Objective: There were no vitals taken for this visit. Patient-Reported Vitals 3/2/2023   Patient-Reported Weight 159   Patient-Reported Pulse 77   Patient-Reported Temperature 98   Patient-Reported Systolic  851   Patient-Reported Diastolic 68             Appearance: alert, well appearing, and in no distress and oriented to person, place, and time. Exam:   NECK - nml appearance  PULM - nml rate and effort       Assessment/Plan:       Diagnoses and all orders for this visit:    1. Controlled type 2 diabetes mellitus without complication, without long-term current use of insulin ([address] - well controlled, cont same meds. Continue improve diet and exercise  -     METABOLIC PANEL, COMPREHENSIVE; Future  -     LIPID PANEL; Future  -     HEMOGLOBIN A1C WITH EAG; Future  -     MICROALBUMIN, UR, RAND W/ MICROALB/CREAT RATIO; Future    2. Essential hypertension - well controlled at home. Continue meds. Check labs. -     METABOLIC PANEL, COMPREHENSIVE; Future    3. Pure hypercholesterolemia - continue same meds. Check labs. -     METABOLIC PANEL, COMPREHENSIVE; Future  -     LIPID PANEL; Future    Follow up 3 months.

## 2023-03-13 NOTE — TELEPHONE ENCOUNTER
----- Message from Cheryl Horton MD sent at 3/13/2023  9:43 AM EDT -----  Regarding: HM  [facility]  Please obtain copies of both

## 2023-03-13 NOTE — TELEPHONE ENCOUNTER
Mammogram completed in December with pap through [facility]  requested.

## 2023-03-13 NOTE — PROGRESS NOTES
Patient states she is here for medication follow up. No chief complaint on file. There were no vitals filed for this visit. Current Outpatient Medications on File Prior to Visit   Medication Sig Dispense Refill    chlorhexidine (PERIDEX) 0.12 % solution RINSE THEN SPIT WITH 15ML BY MOUTH FOR 30 SECONDS TWICE DAILY      amLODIPine (NORVASC) 5 mg tablet TAKE 1 TABLET BY MOUTH EVERY DAY 90 Tablet 3    chlorthalidone (HYGROTON) 25 mg tablet TAKE 1/2 TABLET BY MOUTH EVERY DAY 45 Tablet 3    lisinopriL (PRINIVIL, ZESTRIL) 40 mg tablet TAKE 1 TABLET BY MOUTH EVERY DAY 90 Tablet 3    atorvastatin (LIPITOR) 10 mg tablet Take 1 Tablet by mouth daily. 90 Tablet 3    fexofenadine (ALLEGRA) 180 mg tablet Take 180 mg by mouth daily. fluticasone (Flonase Sensimist) 27.5 mcg/actuation nasal spray 2 Sprays by Nasal route daily. ALPRAZolam (XANAX) 0.5 mg tablet Take 0.5-1 Tabs by mouth two (2) times daily as needed for Anxiety. Max Daily Amount: 1 mg. 30 Tab 1    omega 3-dha-epa-fish oil (Fish Oil) 100-160-1,000 mg cap Take  by mouth.      multivitamin (ONE A DAY) tablet Take 1 Tab by mouth daily. cholecalciferol, vitamin D3, (VITAMIN D3 PO) Take  by mouth. No current facility-administered medications on file prior to visit. Health Maintenance Due   Topic    Pneumococcal 0-64 years (1 - PCV)    Hepatitis B Vaccine (1 of 3 - Risk 3-dose series)    Foot Exam Q1     Diabetic Alb to Cr ratio (uACR) test        1. \"Have you been to the ER, urgent care clinic since your last visit? Hospitalized since your last visit? \" Yes [facility], sinus infection    2. \"Have you seen or consulted any other health care providers outside of the [address] [place] [address] since your last visit? \" No     3. For patients aged 39-70: Has the patient had a colonoscopy / FIT/ Cologuard? Yes - no Care Gap present      If the patient is female:    4.  For patients aged 41-77: Has the patient had a mammogram within the past 2 years? Yes - no Care Gap present      5. For patients aged 21-65: Has the patient had a pap smear?  Yes - no Care Gap present

## 2023-03-16 LAB
ALBUMIN SERPL-MCNC: 4.5 G/DL (ref 3.8–4.9)
ALBUMIN/CREAT UR: 8 MG/G CREAT (ref 0–29)
ALBUMIN/GLOB SERPL: 1.7 {RATIO} (ref 1.2–2.2)
ALP SERPL-CCNC: 75 IU/L (ref 44–121)
ALT SERPL-CCNC: 17 IU/L (ref 0–32)
AST SERPL-CCNC: 25 IU/L (ref 0–40)
BILIRUB SERPL-MCNC: 0.6 MG/DL (ref 0–1.2)
BUN SERPL-MCNC: 15 MG/DL (ref 6–24)
BUN/CREAT SERPL: 18 (ref 9–23)
CALCIUM SERPL-MCNC: 9.5 MG/DL (ref 8.7–10.2)
CHLORIDE SERPL-SCNC: 102 MMOL/L (ref 96–106)
CHOLEST SERPL-MCNC: 178 MG/DL (ref 100–199)
CO2 SERPL-SCNC: 26 MMOL/L (ref 20–29)
CREAT SERPL-MCNC: 0.82 MG/DL (ref 0.57–1)
CREAT UR-MCNC: 65.9 MG/DL
EGFRCR SERPLBLD CKD-EPI 2021: 83 ML/MIN/1.73
EST. AVERAGE GLUCOSE BLD GHB EST-MCNC: 131 MG/DL
GLOBULIN SER CALC-MCNC: 2.6 G/DL (ref 1.5–4.5)
GLUCOSE SERPL-MCNC: 103 MG/DL (ref 70–99)
HBA1C MFR BLD: 6.2 % (ref 4.8–5.6)
HDLC SERPL-MCNC: 99 MG/DL
IMP & REVIEW OF LAB RESULTS: NORMAL
LDLC SERPL CALC-MCNC: 70 MG/DL (ref 0–99)
MICROALBUMIN UR-MCNC: 5.6 UG/ML
POTASSIUM SERPL-SCNC: 4.1 MMOL/L (ref 3.5–5.2)
PROT SERPL-MCNC: 7.1 G/DL (ref 6–8.5)
SODIUM SERPL-SCNC: 141 MMOL/L (ref 134–144)
TRIGL SERPL-MCNC: 40 MG/DL (ref 0–149)
VLDLC SERPL CALC-MCNC: 9 MG/DL (ref 5–40)

## 2023-05-12 DIAGNOSIS — R53.83 OTHER FATIGUE: ICD-10-CM

## 2023-06-19 RX ORDER — ATORVASTATIN CALCIUM 10 MG/1
TABLET, FILM COATED ORAL
Qty: 90 TABLET | Refills: 3 | OUTPATIENT
Start: 2023-06-19

## 2023-06-24 LAB
IRON SATN MFR SERPL: 17 % (ref 15–55)
IRON SERPL-MCNC: 79 UG/DL (ref 27–159)
TIBC SERPL-MCNC: 465 UG/DL (ref 250–450)
UIBC SERPL-MCNC: 386 UG/DL (ref 131–425)
VIT B12 SERPL-MCNC: 670 PG/ML (ref 232–1245)

## 2023-06-26 LAB — PYRIDOXAL PHOS SERPL-MCNC: 13.6 UG/L (ref 3.4–65.2)

## 2023-07-06 ENCOUNTER — OFFICE VISIT (OUTPATIENT)
Age: 58
End: 2023-07-06
Payer: MEDICAID

## 2023-07-06 DIAGNOSIS — E78.00 PURE HYPERCHOLESTEROLEMIA, UNSPECIFIED: ICD-10-CM

## 2023-07-06 DIAGNOSIS — E21.5 DISORDER OF PARATHYROID GLAND (HCC): ICD-10-CM

## 2023-07-06 DIAGNOSIS — E11.9 TYPE 2 DIABETES MELLITUS WITHOUT COMPLICATION, WITHOUT LONG-TERM CURRENT USE OF INSULIN (HCC): Primary | ICD-10-CM

## 2023-07-06 DIAGNOSIS — I10 ESSENTIAL (PRIMARY) HYPERTENSION: ICD-10-CM

## 2023-07-06 LAB — HBA1C MFR BLD: 6 %

## 2023-07-06 PROCEDURE — 99214 OFFICE O/P EST MOD 30 MIN: CPT | Performed by: INTERNAL MEDICINE

## 2023-07-06 PROCEDURE — 3044F HG A1C LEVEL LT 7.0%: CPT | Performed by: INTERNAL MEDICINE

## 2023-07-06 PROCEDURE — PBSHW AMB POC HEMOGLOBIN A1C: Performed by: INTERNAL MEDICINE

## 2023-07-06 PROCEDURE — 83036 HEMOGLOBIN GLYCOSYLATED A1C: CPT | Performed by: INTERNAL MEDICINE

## 2023-07-06 PROCEDURE — 3078F DIAST BP <80 MM HG: CPT | Performed by: INTERNAL MEDICINE

## 2023-07-06 PROCEDURE — 3074F SYST BP LT 130 MM HG: CPT | Performed by: INTERNAL MEDICINE

## 2023-07-06 SDOH — ECONOMIC STABILITY: INCOME INSECURITY: HOW HARD IS IT FOR YOU TO PAY FOR THE VERY BASICS LIKE FOOD, HOUSING, MEDICAL CARE, AND HEATING?: NOT HARD AT ALL

## 2023-07-06 SDOH — ECONOMIC STABILITY: FOOD INSECURITY: WITHIN THE PAST 12 MONTHS, YOU WORRIED THAT YOUR FOOD WOULD RUN OUT BEFORE YOU GOT MONEY TO BUY MORE.: NEVER TRUE

## 2023-07-06 SDOH — ECONOMIC STABILITY: FOOD INSECURITY: WITHIN THE PAST 12 MONTHS, THE FOOD YOU BOUGHT JUST DIDN'T LAST AND YOU DIDN'T HAVE MONEY TO GET MORE.: NEVER TRUE

## 2023-07-06 SDOH — ECONOMIC STABILITY: HOUSING INSECURITY
IN THE LAST 12 MONTHS, WAS THERE A TIME WHEN YOU DID NOT HAVE A STEADY PLACE TO SLEEP OR SLEPT IN A SHELTER (INCLUDING NOW)?: NO

## 2023-07-06 NOTE — PROGRESS NOTES
Subjective:     Elise Rizvi is a 62 y.o. female seen for follow up of diabetes. She also has hypertension and hyperlipidemia. Diabetic Review of Systems - medication compliance: compliant all of the time, diabetic diet compliance: eating much better, only complex carbs, joined Y, going to water aerobics several days a week, home glucose monitoring: is not performed, further diabetic ROS: no polyuria or polydipsia, no chest pain, dyspnea or TIA's, no numbness, tingling or pain in extremities, no unusual visual symptoms, last eye exam approximately 5 months ago - [facility]. .    Other symptoms and concerns:   Constipated and increase in GERD symptoms. Taking probiotic, Align. Current Outpatient Medications   Medication Sig Dispense Refill    ALPRAZolam (XANAX) 0.5 MG tablet Take by mouth 2 times daily as needed. amLODIPine (NORVASC) 5 MG tablet TAKE 1 TABLET BY MOUTH EVERY DAY      atorvastatin (LIPITOR) 10 MG tablet Take by mouth daily      chlorthalidone (HYGROTON) 25 MG tablet TAKE 1/2 TABLET BY MOUTH EVERY DAY      fexofenadine (ALLEGRA) 180 MG tablet Take by mouth daily      fluticasone (FLONASE SENSIMIST) 27.5 MCG/SPRAY nasal spray 2 sprays by Nasal route daily      lisinopril (PRINIVIL;ZESTRIL) 40 MG tablet TAKE 1 TABLET BY MOUTH EVERY DAY       No current facility-administered medications for this visit.         Hemoglobin A1C   Date Value Ref Range Status   03/15/2023 6.2 (H) 4.8 - 5.6 % Final     Comment:              Prediabetes: 5.7 - 6.4           Diabetes: >6.4           Glycemic control for adults with diabetes: <7.0       Hemoglobin A1C, POC   Date Value Ref Range Status   07/06/2023 6.0 % Final      Lab Results   Component Value Date    CHOL 178 03/15/2023    CHOL 174 09/01/2022    CHOL 179 04/13/2022     Lab Results   Component Value Date    TRIG 40 03/15/2023    TRIG 40 09/01/2022    TRIG 42 04/13/2022     Lab Results   Component Value Date    HDL 99 03/15/2023    HDL

## 2023-07-06 NOTE — PROGRESS NOTES
Patient is here for diabetes follow up. 1. \"Have you been to the ER, urgent care clinic since your last visit? Hospitalized since your last visit? \" No    2. \"Have you seen or consulted any other health care providers outside of the [address] [place] [address] since your last visit? \" No     3. For patients aged 43-73: Has the patient had a colonoscopy / FIT/ Cologuard? Yes - no Care Gap present      If the patient is female:    4. For patients aged 43-66: Has the patient had a mammogram within the past 2 years? Yes - no Care Gap present      5. For patients aged 21-65: Has the patient had a pap smear?  Yes - no Care Gap present

## 2023-07-07 VITALS
OXYGEN SATURATION: 97 % | HEIGHT: 66 IN | TEMPERATURE: 98.2 F | SYSTOLIC BLOOD PRESSURE: 113 MMHG | BODY MASS INDEX: 25.49 KG/M2 | DIASTOLIC BLOOD PRESSURE: 67 MMHG | RESPIRATION RATE: 20 BRPM | HEART RATE: 68 BPM | WEIGHT: 158.6 LBS

## 2023-07-07 RX ORDER — METHOCARBAMOL 500 MG/1
500 TABLET, FILM COATED ORAL 2 TIMES DAILY PRN
COMMUNITY
Start: 2022-10-17

## 2023-07-07 RX ORDER — ACETAMINOPHEN 325 MG/1
TABLET ORAL
COMMUNITY
Start: 2023-02-17

## 2023-07-07 RX ORDER — DIAZEPAM 5 MG/1
5 TABLET ORAL ONCE
COMMUNITY
Start: 2022-10-06

## 2023-07-07 RX ORDER — FLUCONAZOLE 150 MG/1
TABLET ORAL
COMMUNITY
Start: 2023-03-27

## 2023-07-07 RX ORDER — NYSTATIN 100000 U/G
CREAM TOPICAL
COMMUNITY
Start: 2023-03-27

## 2023-07-07 RX ORDER — CHLORAL HYDRATE 500 MG
CAPSULE ORAL
COMMUNITY

## 2023-07-07 RX ORDER — CHLORHEXIDINE GLUCONATE 0.12 MG/ML
RINSE ORAL
COMMUNITY
Start: 2023-01-22

## 2023-07-17 RX ORDER — CHLORTHALIDONE 25 MG/1
TABLET ORAL
Qty: 45 TABLET | Refills: 3 | Status: SHIPPED | OUTPATIENT
Start: 2023-07-17

## 2023-07-17 RX ORDER — AMLODIPINE BESYLATE 5 MG/1
TABLET ORAL
Qty: 90 TABLET | Refills: 3 | Status: SHIPPED | OUTPATIENT
Start: 2023-07-17

## 2023-07-25 ENCOUNTER — OFFICE VISIT (OUTPATIENT)
Age: 58
End: 2023-07-25
Payer: MEDICAID

## 2023-07-25 VITALS
TEMPERATURE: 98.2 F | HEIGHT: 66 IN | BODY MASS INDEX: 25.55 KG/M2 | HEART RATE: 66 BPM | RESPIRATION RATE: 20 BRPM | SYSTOLIC BLOOD PRESSURE: 117 MMHG | OXYGEN SATURATION: 97 % | WEIGHT: 159 LBS | DIASTOLIC BLOOD PRESSURE: 72 MMHG

## 2023-07-25 DIAGNOSIS — J01.00 SUBACUTE MAXILLARY SINUSITIS: Primary | ICD-10-CM

## 2023-07-25 DIAGNOSIS — J02.9 PHARYNGITIS, UNSPECIFIED ETIOLOGY: ICD-10-CM

## 2023-07-25 DIAGNOSIS — J30.1 SEASONAL ALLERGIC RHINITIS DUE TO POLLEN: ICD-10-CM

## 2023-07-25 PROBLEM — K22.2 LOWER ESOPHAGEAL RING: Status: ACTIVE | Noted: 2023-07-25

## 2023-07-25 LAB
GROUP A STREP ANTIGEN, POC: NEGATIVE
VALID INTERNAL CONTROL, POC: NORMAL

## 2023-07-25 PROCEDURE — 87880 STREP A ASSAY W/OPTIC: CPT | Performed by: NURSE PRACTITIONER

## 2023-07-25 PROCEDURE — 3074F SYST BP LT 130 MM HG: CPT | Performed by: NURSE PRACTITIONER

## 2023-07-25 PROCEDURE — 99213 OFFICE O/P EST LOW 20 MIN: CPT | Performed by: NURSE PRACTITIONER

## 2023-07-25 PROCEDURE — 3078F DIAST BP <80 MM HG: CPT | Performed by: NURSE PRACTITIONER

## 2023-07-25 PROCEDURE — PBSHW AMB POC RAPID STREP A: Performed by: NURSE PRACTITIONER

## 2023-07-25 RX ORDER — AZITHROMYCIN 250 MG/1
250 TABLET, FILM COATED ORAL SEE ADMIN INSTRUCTIONS
Qty: 6 TABLET | Refills: 0 | Status: SHIPPED | OUTPATIENT
Start: 2023-07-25 | End: 2023-07-30

## 2023-07-25 ASSESSMENT — ENCOUNTER SYMPTOMS
SHORTNESS OF BREATH: 0
COUGH: 1
RHINORRHEA: 1
SINUS PRESSURE: 1
SINUS PAIN: 1
TROUBLE SWALLOWING: 0
SORE THROAT: 0

## 2023-07-25 NOTE — PROGRESS NOTES
Patient is here for sore throat, drainage,and cough. 1. \"Have you been to the ER, urgent care clinic since your last visit? Hospitalized since your last visit? \" No    2. \"Have you seen or consulted any other health care providers outside of the [address] [place] [address] since your last visit? \" No     3. For patients aged 43-73: Has the patient had a colonoscopy / FIT/ Cologuard? Yes - no Care Gap present      If the patient is female:    4. For patients aged 43-66: Has the patient had a mammogram within the past 2 years? Yes - no Care Gap present      5. For patients aged 21-65: Has the patient had a pap smear?  Yes - no Care Gap present

## 2023-07-26 RX ORDER — LISINOPRIL 40 MG/1
TABLET ORAL
Qty: 90 TABLET | Refills: 3 | Status: SHIPPED | OUTPATIENT
Start: 2023-07-26

## 2023-09-28 LAB
ALBUMIN SERPL-MCNC: 4.6 G/DL (ref 3.8–4.9)
ALBUMIN/GLOB SERPL: 1.7 {RATIO} (ref 1.2–2.2)
ALP SERPL-CCNC: 78 IU/L (ref 44–121)
ALT SERPL-CCNC: 18 IU/L (ref 0–32)
AST SERPL-CCNC: 23 IU/L (ref 0–40)
BILIRUB SERPL-MCNC: 0.6 MG/DL (ref 0–1.2)
BUN SERPL-MCNC: 16 MG/DL (ref 6–24)
BUN/CREAT SERPL: 20 (ref 9–23)
CALCIUM SERPL-MCNC: 9.7 MG/DL (ref 8.7–10.2)
CHLORIDE SERPL-SCNC: 101 MMOL/L (ref 96–106)
CHOLEST SERPL-MCNC: 235 MG/DL (ref 100–199)
CO2 SERPL-SCNC: 24 MMOL/L (ref 20–29)
CREAT SERPL-MCNC: 0.8 MG/DL (ref 0.57–1)
EGFRCR SERPLBLD CKD-EPI 2021: 85 ML/MIN/1.73
GLOBULIN SER CALC-MCNC: 2.7 G/DL (ref 1.5–4.5)
GLUCOSE SERPL-MCNC: 109 MG/DL (ref 70–99)
HBA1C MFR BLD: 6.3 % (ref 4.8–5.6)
HDLC SERPL-MCNC: 99 MG/DL
IMP & REVIEW OF LAB RESULTS: NORMAL
LDLC SERPL CALC-MCNC: 129 MG/DL (ref 0–99)
POTASSIUM SERPL-SCNC: 4.9 MMOL/L (ref 3.5–5.2)
PROT SERPL-MCNC: 7.3 G/DL (ref 6–8.5)
SODIUM SERPL-SCNC: 142 MMOL/L (ref 134–144)
TRIGL SERPL-MCNC: 44 MG/DL (ref 0–149)
VLDLC SERPL CALC-MCNC: 7 MG/DL (ref 5–40)

## 2023-10-06 DIAGNOSIS — E78.00 PURE HYPERCHOLESTEROLEMIA, UNSPECIFIED: ICD-10-CM

## 2023-10-06 DIAGNOSIS — E11.9 TYPE 2 DIABETES MELLITUS WITHOUT COMPLICATION, WITHOUT LONG-TERM CURRENT USE OF INSULIN (HCC): ICD-10-CM

## 2023-10-06 DIAGNOSIS — I10 ESSENTIAL (PRIMARY) HYPERTENSION: ICD-10-CM

## 2023-10-09 ENCOUNTER — OFFICE VISIT (OUTPATIENT)
Age: 58
End: 2023-10-09
Payer: MEDICAID

## 2023-10-09 VITALS
HEIGHT: 66 IN | DIASTOLIC BLOOD PRESSURE: 80 MMHG | TEMPERATURE: 97.3 F | SYSTOLIC BLOOD PRESSURE: 124 MMHG | RESPIRATION RATE: 16 BRPM | HEART RATE: 69 BPM | WEIGHT: 157.4 LBS | OXYGEN SATURATION: 98 % | BODY MASS INDEX: 25.3 KG/M2

## 2023-10-09 DIAGNOSIS — E11.9 TYPE 2 DIABETES MELLITUS WITHOUT COMPLICATION, WITHOUT LONG-TERM CURRENT USE OF INSULIN (HCC): Primary | ICD-10-CM

## 2023-10-09 DIAGNOSIS — E78.00 PURE HYPERCHOLESTEROLEMIA: ICD-10-CM

## 2023-10-09 DIAGNOSIS — I10 ESSENTIAL (PRIMARY) HYPERTENSION: ICD-10-CM

## 2023-10-09 PROCEDURE — 99214 OFFICE O/P EST MOD 30 MIN: CPT | Performed by: INTERNAL MEDICINE

## 2023-10-09 PROCEDURE — 3074F SYST BP LT 130 MM HG: CPT | Performed by: INTERNAL MEDICINE

## 2023-10-09 PROCEDURE — 3044F HG A1C LEVEL LT 7.0%: CPT | Performed by: INTERNAL MEDICINE

## 2023-10-09 PROCEDURE — 3079F DIAST BP 80-89 MM HG: CPT | Performed by: INTERNAL MEDICINE

## 2023-10-09 RX ORDER — ATORVASTATIN CALCIUM 10 MG/1
10 TABLET, FILM COATED ORAL DAILY
Qty: 90 TABLET | Refills: 3 | Status: SHIPPED | OUTPATIENT
Start: 2023-10-09

## 2023-10-09 NOTE — PATIENT INSTRUCTIONS
Accepting new patients:    [facility]:  MD Chely Sanchez, NP    [facility] [phone]  MD Gretta Cortes, [facility], [address] [phone]  MD Breonna Lundberg MD Jolane Medal, MD Nakul Calderón, MD Merlin Calloway

## 2023-10-09 NOTE — PROGRESS NOTES
Subjective:     Tiana Man is a 62 y.o. female seen for follow up of diabetes. She also has hypertension and hyperlipidemia. Diabetic Review of Systems - medication compliance: compliant all of the time, diabetic diet compliance: compliant most of the time, home glucose monitoring: is not performed, further diabetic ROS: no polyuria or polydipsia, no chest pain, dyspnea or TIA's, no numbness, tingling or pain in extremities, no unusual visual symptoms, last eye exam approximately 10 months ago. Dr. Aureliano Moe. Other symptoms and concerns:   Had stopped her Atorvastatin since last visit. Restarted 1 week ago after LDL came back elevated. Grey Domeniconte HIV screening completed through GYN  Pap and mammo scheduled for next month. Blood pressure well controlled at home. Current Outpatient Medications   Medication Sig Dispense Refill    NONFORMULARY Omega 3 with Vitamin D liquid      atorvastatin (LIPITOR) 10 MG tablet Take 1 tablet by mouth daily 90 tablet 3    lisinopril (PRINIVIL;ZESTRIL) 40 MG tablet TAKE 1 TABLET BY MOUTH EVERY DAY 90 tablet 3    amLODIPine (NORVASC) 5 MG tablet TAKE 1 TABLET BY MOUTH EVERY DAY 90 tablet 3    chlorthalidone (HYGROTON) 25 MG tablet TAKE 1/2 TABLET BY MOUTH EVERY DAY 45 tablet 3     No current facility-administered medications for this visit.         Hemoglobin A1C   Date Value Ref Range Status   09/27/2023 6.3 (H) 4.8 - 5.6 % Final     Comment:              Prediabetes: 5.7 - 6.4           Diabetes: >6.4           Glycemic control for adults with diabetes: <7.0       Hemoglobin A1C, POC   Date Value Ref Range Status   07/06/2023 6.0 % Final      Lab Results   Component Value Date    CHOL 235 (H) 09/27/2023    CHOL 178 03/15/2023    CHOL 174 09/01/2022     Lab Results   Component Value Date    TRIG 44 09/27/2023    TRIG 40 03/15/2023    TRIG 40 09/01/2022     Lab Results   Component Value Date    HDL 99 09/27/2023    HDL 99 03/15/2023    HDL 93 09/01/2022     Lab Results

## 2023-11-03 ENCOUNTER — TELEPHONE (OUTPATIENT)
Age: 58
End: 2023-11-03

## 2023-11-03 NOTE — TELEPHONE ENCOUNTER
Spoke to pt and inform her unfortunately Dr. Esqueda is no longer accepting new pts and offered pt a new pt appt with another provider. Pt declined appt and said thanks for calling her back.-TM 11/3/23

## 2023-11-03 NOTE — TELEPHONE ENCOUNTER
----- Message from Kim Marshall sent at 11/3/2023  8:25 AM EDT -----  Subject: Appointment Request    Reason for Call: New Patient/New to Provider Appointment needed: New   Patient Request Appointment    QUESTIONS    Reason for appointment request? No appointments available during search     Additional Information for Provider? Patient requests call back to let her   know if Dr. Latham would accept her as a new patient.   ---------------------------------------------------------------------------  --------------  CALL BACK INFO  [phone]; OK to leave message on voicemail  ---------------------------------------------------------------------------  --------------  SCRIPT ANSWERS

## 2023-11-14 ENCOUNTER — TELEPHONE (OUTPATIENT)
Age: 58
End: 2023-11-14

## 2023-11-14 NOTE — TELEPHONE ENCOUNTER
Dr. Daysi Hurd suggested that Ms. Caro call Dr. Amando Nugent to be come her pt.  called to give message. Was advised that Dr. Americo Lebron panel is closed.

## 2023-11-14 NOTE — TELEPHONE ENCOUNTER
Sent message to provider to see if pt will be accepted as Dr. Zahida Saul is not accepting NP. Waiting for a response.

## 2023-11-17 NOTE — TELEPHONE ENCOUNTER
Called, ALESSANDRA for pt that Dr. Linda Mandujano has approved of seeing pt in the office. Pt will need to call back to schedule NP appt.

## 2023-11-22 ENCOUNTER — TELEPHONE (OUTPATIENT)
Age: 58
End: 2023-11-22

## 2023-11-28 ENCOUNTER — TELEPHONE (OUTPATIENT)
Age: 58
End: 2023-11-28

## 2023-11-28 NOTE — TELEPHONE ENCOUNTER
Sending message to provider to see if this is correct. At this time, provider is not accepting NP. Waiting for a response.

## 2023-11-28 NOTE — TELEPHONE ENCOUNTER
, Lex Recinos states that Dr. Gary Díaz spoke with Dr. Sravan Saleh on 11-27-23 and Dr. Gary Díaz said she would take pt on as NP. Please call Graciela to schedule.

## 2023-11-29 NOTE — TELEPHONE ENCOUNTER
Called, LM for pt that Dr. Jessika Villalba approved of seeing NP.  Pt needs to call back and schedule NP appt

## 2023-12-01 NOTE — TELEPHONE ENCOUNTER
Call pt [phone]     called to schedule.   Team psr not available at time of call    Please call patient to schedule directly  Call pt [phone]

## 2023-12-01 NOTE — TELEPHONE ENCOUNTER
Called pt's #, phone # to call pt goes straight to . Pt's spouse is letting pt know to call the office and be transferred to [facility] to schedule.  Waiting for pt to call back office to schedule appt

## 2024-01-09 DIAGNOSIS — I10 ESSENTIAL (PRIMARY) HYPERTENSION: ICD-10-CM

## 2024-01-09 DIAGNOSIS — E78.00 PURE HYPERCHOLESTEROLEMIA: ICD-10-CM

## 2024-01-09 DIAGNOSIS — E11.9 TYPE 2 DIABETES MELLITUS WITHOUT COMPLICATION, WITHOUT LONG-TERM CURRENT USE OF INSULIN (HCC): ICD-10-CM

## 2024-01-10 LAB
ALBUMIN SERPL-MCNC: 4.6 G/DL (ref 3.8–4.9)
ALBUMIN/GLOB SERPL: 2.1 {RATIO} (ref 1.2–2.2)
ALP SERPL-CCNC: 73 IU/L (ref 44–121)
ALT SERPL-CCNC: 16 IU/L (ref 0–32)
AST SERPL-CCNC: 21 IU/L (ref 0–40)
BILIRUB SERPL-MCNC: 0.6 MG/DL (ref 0–1.2)
BUN SERPL-MCNC: 16 MG/DL (ref 6–24)
BUN/CREAT SERPL: 20 (ref 9–23)
CALCIUM SERPL-MCNC: 9.8 MG/DL (ref 8.7–10.2)
CHLORIDE SERPL-SCNC: 100 MMOL/L (ref 96–106)
CHOLEST SERPL-MCNC: 183 MG/DL (ref 100–199)
CO2 SERPL-SCNC: 26 MMOL/L (ref 20–29)
CREAT SERPL-MCNC: 0.79 MG/DL (ref 0.57–1)
EGFRCR SERPLBLD CKD-EPI 2021: 87 ML/MIN/1.73
GLOBULIN SER CALC-MCNC: 2.2 G/DL (ref 1.5–4.5)
GLUCOSE SERPL-MCNC: 110 MG/DL (ref 70–99)
HBA1C MFR BLD: 6.2 % (ref 4.8–5.6)
HDLC SERPL-MCNC: 97 MG/DL
IMP & REVIEW OF LAB RESULTS: NORMAL
LDLC SERPL CALC-MCNC: 77 MG/DL (ref 0–99)
Lab: NORMAL
POTASSIUM SERPL-SCNC: 4.2 MMOL/L (ref 3.5–5.2)
PROT SERPL-MCNC: 6.8 G/DL (ref 6–8.5)
SODIUM SERPL-SCNC: 139 MMOL/L (ref 134–144)
TRIGL SERPL-MCNC: 43 MG/DL (ref 0–149)
VLDLC SERPL CALC-MCNC: 9 MG/DL (ref 5–40)

## 2024-01-16 SDOH — HEALTH STABILITY: PHYSICAL HEALTH: ON AVERAGE, HOW MANY MINUTES DO YOU ENGAGE IN EXERCISE AT THIS LEVEL?: 60 MIN

## 2024-01-16 SDOH — HEALTH STABILITY: PHYSICAL HEALTH: ON AVERAGE, HOW MANY DAYS PER WEEK DO YOU ENGAGE IN MODERATE TO STRENUOUS EXERCISE (LIKE A BRISK WALK)?: 3 DAYS

## 2024-01-18 ENCOUNTER — OFFICE VISIT (OUTPATIENT)
Age: 59
End: 2024-01-18
Payer: COMMERCIAL

## 2024-01-18 VITALS
HEART RATE: 86 BPM | RESPIRATION RATE: 19 BRPM | HEIGHT: 65 IN | BODY MASS INDEX: 25.83 KG/M2 | DIASTOLIC BLOOD PRESSURE: 71 MMHG | WEIGHT: 155 LBS | SYSTOLIC BLOOD PRESSURE: 111 MMHG | OXYGEN SATURATION: 98 % | TEMPERATURE: 97.5 F

## 2024-01-18 DIAGNOSIS — E11.9 TYPE 2 DIABETES MELLITUS WITHOUT COMPLICATION, WITHOUT LONG-TERM CURRENT USE OF INSULIN (HCC): ICD-10-CM

## 2024-01-18 DIAGNOSIS — E78.2 MIXED HYPERLIPIDEMIA: Primary | ICD-10-CM

## 2024-01-18 DIAGNOSIS — I10 ESSENTIAL (PRIMARY) HYPERTENSION: ICD-10-CM

## 2024-01-18 PROCEDURE — 3074F SYST BP LT 130 MM HG: CPT | Performed by: FAMILY MEDICINE

## 2024-01-18 PROCEDURE — 3044F HG A1C LEVEL LT 7.0%: CPT | Performed by: FAMILY MEDICINE

## 2024-01-18 PROCEDURE — 3078F DIAST BP <80 MM HG: CPT | Performed by: FAMILY MEDICINE

## 2024-01-18 PROCEDURE — 99204 OFFICE O/P NEW MOD 45 MIN: CPT | Performed by: FAMILY MEDICINE

## 2024-01-18 RX ORDER — VIT B COMP NO.3/FOLIC/C/BIOTIN 1 MG-60 MG
1 TABLET ORAL
COMMUNITY

## 2024-01-18 SDOH — ECONOMIC STABILITY: FOOD INSECURITY: WITHIN THE PAST 12 MONTHS, THE FOOD YOU BOUGHT JUST DIDN'T LAST AND YOU DIDN'T HAVE MONEY TO GET MORE.: NEVER TRUE

## 2024-01-18 SDOH — ECONOMIC STABILITY: INCOME INSECURITY: HOW HARD IS IT FOR YOU TO PAY FOR THE VERY BASICS LIKE FOOD, HOUSING, MEDICAL CARE, AND HEATING?: NOT VERY HARD

## 2024-01-18 SDOH — ECONOMIC STABILITY: FOOD INSECURITY: WITHIN THE PAST 12 MONTHS, YOU WORRIED THAT YOUR FOOD WOULD RUN OUT BEFORE YOU GOT MONEY TO BUY MORE.: NEVER TRUE

## 2024-01-18 ASSESSMENT — ANXIETY QUESTIONNAIRES
2. NOT BEING ABLE TO STOP OR CONTROL WORRYING: 0
GAD7 TOTAL SCORE: 0
7. FEELING AFRAID AS IF SOMETHING AWFUL MIGHT HAPPEN: 0
3. WORRYING TOO MUCH ABOUT DIFFERENT THINGS: 0
IF YOU CHECKED OFF ANY PROBLEMS ON THIS QUESTIONNAIRE, HOW DIFFICULT HAVE THESE PROBLEMS MADE IT FOR YOU TO DO YOUR WORK, TAKE CARE OF THINGS AT HOME, OR GET ALONG WITH OTHER PEOPLE: NOT DIFFICULT AT ALL
4. TROUBLE RELAXING: 0
5. BEING SO RESTLESS THAT IT IS HARD TO SIT STILL: 0
1. FEELING NERVOUS, ANXIOUS, OR ON EDGE: 0
6. BECOMING EASILY ANNOYED OR IRRITABLE: 0

## 2024-01-18 ASSESSMENT — PATIENT HEALTH QUESTIONNAIRE - PHQ9
1. LITTLE INTEREST OR PLEASURE IN DOING THINGS: 0
SUM OF ALL RESPONSES TO PHQ QUESTIONS 1-9: 0
SUM OF ALL RESPONSES TO PHQ QUESTIONS 1-9: 0
2. FEELING DOWN, DEPRESSED OR HOPELESS: 0
SUM OF ALL RESPONSES TO PHQ9 QUESTIONS 1 & 2: 0
SUM OF ALL RESPONSES TO PHQ QUESTIONS 1-9: 0
SUM OF ALL RESPONSES TO PHQ QUESTIONS 1-9: 0

## 2024-01-18 NOTE — PROGRESS NOTES
1. \"Have you been to the ER, urgent care clinic since your last visit?  Hospitalized since your last visit?\" No    2. \"Have you seen or consulted any other health care providers outside of the [facility] since your last visit?\" No     3. For patients aged 45-75: Has the patient had a colonoscopy / FIT/ Cologuard? Yes - no Care Gap present      If the patient is female:    4. For patients aged 40-74: Has the patient had a mammogram within the past 2 years? Yes - no Care Gap present      5. For patients aged 21-65: Has the patient had a pap smear? Yes - no Care Gap present  
without long-term current use of insulin (HCC)  Pt should continue to maintain a healthy diet and continue exercising regularly. I advise the pt to continue seeing her nutritionist as they will help her curate a diet that will help her improve her HbA1c.      I have reviewed the patient's medications and risks/side effects/benefits were discussed. Diagnosis(-es) explained to patient and questions answered. Literature provided where appropriate.     I, Georgie Huang, am scribing for, and in the presence of, Dr Negra Morales. Electronarcotically signed by: Georgie Huang on 1/18/2024 at 9:19 AM    I have reviewed the note documented by the scribe.  The services provided are my own.  The documentation is accurate. Negra Morales MD

## 2024-01-19 LAB
CHOLEST SERPL-MCNC: 197 MG/DL (ref 100–199)
HDL SERPL-SCNC: 41 UMOL/L
HDLC SERPL-MCNC: 114 MG/DL
LDL SERPL QN: 20.9 NM
LDL SERPL-SCNC: 520 NMOL/L
LDL SMALL SERPL-SCNC: <90 NMOL/L
LDLC SERPL CALC-MCNC: 74 MG/DL (ref 0–99)
LP-IR SCORE SERPL: <25
TRIGL SERPL-MCNC: 47 MG/DL (ref 0–149)

## 2024-01-20 LAB — IMP & REVIEW OF LAB RESULTS: NORMAL

## 2024-04-11 ENCOUNTER — TELEPHONE (OUTPATIENT)
Age: 59
End: 2024-04-11

## 2024-04-11 ENCOUNTER — OFFICE VISIT (OUTPATIENT)
Age: 59
End: 2024-04-11
Payer: COMMERCIAL

## 2024-04-11 VITALS
HEART RATE: 81 BPM | BODY MASS INDEX: 25.76 KG/M2 | SYSTOLIC BLOOD PRESSURE: 112 MMHG | OXYGEN SATURATION: 100 % | RESPIRATION RATE: 18 BRPM | DIASTOLIC BLOOD PRESSURE: 69 MMHG | HEIGHT: 65 IN | TEMPERATURE: 97.7 F | WEIGHT: 154.6 LBS

## 2024-04-11 DIAGNOSIS — E21.5 DISORDER OF PARATHYROID GLAND (HCC): ICD-10-CM

## 2024-04-11 DIAGNOSIS — Z00.00 ANNUAL PHYSICAL EXAM: Primary | ICD-10-CM

## 2024-04-11 DIAGNOSIS — J30.1 SEASONAL ALLERGIC RHINITIS DUE TO POLLEN: ICD-10-CM

## 2024-04-11 DIAGNOSIS — R05.1 ACUTE COUGH: ICD-10-CM

## 2024-04-11 DIAGNOSIS — E11.9 TYPE 2 DIABETES MELLITUS WITHOUT COMPLICATION, WITHOUT LONG-TERM CURRENT USE OF INSULIN (HCC): ICD-10-CM

## 2024-04-11 PROCEDURE — 99396 PREV VISIT EST AGE 40-64: CPT | Performed by: FAMILY MEDICINE

## 2024-04-11 PROCEDURE — 3074F SYST BP LT 130 MM HG: CPT | Performed by: FAMILY MEDICINE

## 2024-04-11 PROCEDURE — 3078F DIAST BP <80 MM HG: CPT | Performed by: FAMILY MEDICINE

## 2024-04-11 RX ORDER — AZELASTINE HYDROCHLORIDE 137 UG/1
SPRAY, METERED NASAL
COMMUNITY
Start: 2024-03-12

## 2024-04-11 RX ORDER — BENZONATATE 200 MG/1
200 CAPSULE ORAL 3 TIMES DAILY PRN
Qty: 21 CAPSULE | Refills: 0 | Status: SHIPPED | OUTPATIENT
Start: 2024-04-11 | End: 2024-04-18

## 2024-04-11 RX ORDER — AZITHROMYCIN 250 MG/1
TABLET, FILM COATED ORAL
Qty: 6 TABLET | Refills: 0 | Status: SHIPPED | OUTPATIENT
Start: 2024-04-11 | End: 2024-04-21

## 2024-04-11 NOTE — TELEPHONE ENCOUNTER
Patient states that she has never taken,       B complex-vitamin C-folic acid (NEPHRO-NIHARIKA) 1 MG tablet [[id number]] and would like it removed from her medication list.

## 2024-04-11 NOTE — PROGRESS NOTES
SUBJECTIVE:   Ms. Graciela Caro is a 59 y.o. female who is here for follow up of routine medical issues.      HTN: Pt is compliant in taking amlodipine 5mg daily and lisinopril 40mg daily. Pt's BP in the office today was 112/69.    HLD: Pt is complaint in taking atorvastatin 10mg daily. Her last lipid panel was normal on 01/18/2024. Pt continues to exercise. She has not been exercise as often as she used to due to the pollen.    DM2: Pt controls her DM2 through diet and exercise. Their last HbA1c was 6.2% on 01/09/2024. Pt reports doing a steroid injection in her L cervical spine on 04/03/2024.     Pt complains of a cough that began last week. She states it only occurs at night. She says in the past her cough will settle into bronchitis. Pt reports slight congestion and says she coughs up a small amount of mucus. She denies fever. She says her cough was better last night and believes the intensity of the cough may be improving. Pt also complains of post nasal drip that began last week. She reports having otalgia and maxillary sinus pain last week but says it has since improved. She has not taken anything OTC for her cough and post nasal drip. Pt says she no longer uses Azelastine WAU540 mcg/spray.     PREVENTIVE:   Mammogram: UTD (12/2023)  Colonoscopy: UTD (11/20/2019)  Eye Exam: UTD    Pt specifically denies changes in vision or hearing, trouble with swallowing or taste, CP, SOB, heartburn or upset stomach, change in bowel habits, unusual joint or muscle pains, numbness or tingling in extremities, or skin lesions of concern.    At this time, she is otherwise doing well and has brought no other complaints to my attention today.  For a list of the medical issues addressed today, see the assessment and plan below.    PMH:   Past Medical History:   Diagnosis Date    Anxiety     Arthritis     Calculus of kidney 01/01/2014    seen on ultrasound - no sxs ever    Environmental allergies     GERD (gastroesophageal reflux

## 2024-04-12 LAB — HBA1C MFR BLD: 6.4 % (ref 4.8–5.6)

## 2024-04-30 RX ORDER — LISINOPRIL 40 MG/1
TABLET ORAL
Qty: 90 TABLET | Refills: 3 | Status: SHIPPED | OUTPATIENT
Start: 2024-04-30

## 2024-04-30 RX ORDER — AMLODIPINE BESYLATE 5 MG/1
TABLET ORAL
Qty: 90 TABLET | Refills: 3 | Status: SHIPPED | OUTPATIENT
Start: 2024-04-30

## 2024-05-20 ENCOUNTER — HOSPITAL ENCOUNTER (OUTPATIENT)
Facility: HOSPITAL | Age: 59
Discharge: HOME | End: 2024-05-23
Attending: OTOLARYNGOLOGY
Payer: COMMERCIAL

## 2024-05-20 DIAGNOSIS — J32.4 CHRONIC PANSINUSITIS: ICD-10-CM

## 2024-05-20 PROCEDURE — 70486 CT MAXILLOFACIAL W/O DYE: CPT

## 2024-06-17 ENCOUNTER — TELEPHONE (OUTPATIENT)
Age: 59
End: 2024-06-17

## 2024-06-17 NOTE — TELEPHONE ENCOUNTER
Pt states she takes atorvastatin and has read up on this.    Pt is losing a lot of hair and it just sheds when she hammond it.      Pt would like a call pertaining to this.

## 2024-06-19 ENCOUNTER — TELEMEDICINE (OUTPATIENT)
Age: 59
End: 2024-06-19
Payer: COMMERCIAL

## 2024-06-19 DIAGNOSIS — L67.8 BRITTLE HAIR: Primary | ICD-10-CM

## 2024-06-19 PROCEDURE — 99213 OFFICE O/P EST LOW 20 MIN: CPT | Performed by: FAMILY MEDICINE

## 2024-06-19 RX ORDER — OMEPRAZOLE 40 MG/1
40 CAPSULE, DELAYED RELEASE ORAL
COMMUNITY
Start: 2024-05-08

## 2024-06-19 NOTE — PROGRESS NOTES
2024    TELEHEALTH EVALUATION -- Audio/Visual    HPI:    Graciela Caro (:  1965) has requested an audio/video evaluation for the following concern(s):    The patient explains she has always had some shedding but her hair loss  started to  after her in office visit 2024. Pt denies any new regimens or treatments or any new stressors. Pt states its breakage she doesn't see the root. She admits every time she runs her hands through her hair she needs to vacuum. She admits the breakage started in the front of her hair.       Review of Systems   All other systems reviewed and are negative.      Prior to Visit Medications    Medication Sig Taking? Authorizing Provider   Multiple Vitamins-Minerals (MULTIVITAMIN ADULTS PO) 0 Yes Nu Rogers MD   omeprazole (PRILOSEC) 40 MG delayed release capsule 1 capsule Yes Nu Rogers MD   amLODIPine (NORVASC) 5 MG tablet TAKE 1 TABLET BY MOUTH EVERY DAY Yes Negra Morales MD   lisinopril (PRINIVIL;ZESTRIL) 40 MG tablet TAKE 1 TABLET BY MOUTH EVERY DAY Yes Negra Morales MD   NONFORMULARY Omega 3 with Vitamin D liquid Yes Nu Rogers MD   atorvastatin (LIPITOR) 10 MG tablet Take 1 tablet by mouth daily Yes Kim Cornelius MD   chlorthalidone (HYGROTON) 25 MG tablet TAKE 1/2 TABLET BY MOUTH EVERY DAY Yes Kim Cornelius MD   Azelastine HCl 137 MCG/SPRAY SOLN   Nu Rogers MD       Social History     Tobacco Use    Smoking status: Never    Smokeless tobacco: Never   Vaping Use    Vaping Use: Never used   Substance Use Topics    Alcohol use: No    Drug use: No          PHYSICAL EXAMINATION:  [ INSTRUCTIONS:  \"[x]\" Indicates a positive item  \"[]\" Indicates a negative item  -- DELETE ALL ITEMS NOT EXAMINED]  Vital Signs: (As obtained by patient/caregiver or practitioner observation)    Blood pressure-  Heart rate-    Respiratory rate-    Temperature-  Pulse oximetry-     Constitutional: [x] Appears well-developed and

## 2024-07-10 DIAGNOSIS — I10 ESSENTIAL (PRIMARY) HYPERTENSION: Primary | ICD-10-CM

## 2024-07-10 NOTE — TELEPHONE ENCOUNTER
----- Message from Rafiq Mitchell sent at 7/10/2024  9:27 AM EDT -----  Regarding: ECC Message to Provider  ECC Message to Provider    Relationship to Patient: Self     Additional Information Patient wanted to ask if doctor Andrew Omer already receive her test result for blood work for [facility] doctor. Done on April 24, 2024   --------------------------------------------------------------------------------------------------------------------------    Call Back Information: OK to leave message on voicemail  Preferred Call Back Number: Phone [phone]

## 2024-07-19 RX ORDER — CHLORTHALIDONE 25 MG/1
12.5 TABLET ORAL DAILY
Qty: 135 TABLET | Refills: 2 | Status: SHIPPED | OUTPATIENT
Start: 2024-07-19

## 2024-07-19 NOTE — TELEPHONE ENCOUNTER
The patient stated she did not know what \"LightBlade\" means, maybe it is a typo. She is going to call her endocrinologist and ask them to refax lab results to an alternative fax #.    While I was on the phone, she requested a Rx RF. Patient verbalized understanding of information discussed w/ no further questions at this time.    Future Appointments:  Future Appointments   Date Time Provider Department Center   4/17/2025  8:40 AM Negra Morales MD [facility]        Last Appointment With Me:  6/19/2024     Requested Prescriptions     Pending Prescriptions Disp Refills    chlorthalidone (HYGROTON) 25 MG tablet 45 tablet 3     Sig: Take 0.5 tablets by mouth daily

## 2024-08-02 ENCOUNTER — TELEPHONE (OUTPATIENT)
Age: 59
End: 2024-08-02

## 2024-08-02 RX ORDER — BENZONATATE 100 MG/1
100 CAPSULE ORAL 3 TIMES DAILY PRN
Qty: 30 CAPSULE | Refills: 0 | Status: SHIPPED | OUTPATIENT
Start: 2024-08-02 | End: 2024-08-12

## 2024-08-05 NOTE — TELEPHONE ENCOUNTER
She has been ill for a couple of days. Just tested positive for COVID.     Plan:   Paxlovid ordered.   Symptomatic measures advised.     BJF

## 2024-09-23 ENCOUNTER — TELEPHONE (OUTPATIENT)
Age: 59
End: 2024-09-23

## 2024-09-23 NOTE — TELEPHONE ENCOUNTER
Received call to report swelling of both ankles  09/14- present  Tenderness: mild when touching   No redness    Type of injury, if applicable edema    Any respiratory symptoms none    Taking medications as prescribed, if applicable Yes Chlorithalidone 25mg    Pt has been elevating legs, taking medications as directed.    Pt states she has never had swelling before.

## 2024-10-10 ENCOUNTER — OFFICE VISIT (OUTPATIENT)
Age: 59
End: 2024-10-10

## 2024-10-10 VITALS
HEART RATE: 76 BPM | TEMPERATURE: 98.1 F | DIASTOLIC BLOOD PRESSURE: 65 MMHG | BODY MASS INDEX: 24.72 KG/M2 | RESPIRATION RATE: 18 BRPM | HEIGHT: 66 IN | SYSTOLIC BLOOD PRESSURE: 113 MMHG | WEIGHT: 153.8 LBS | OXYGEN SATURATION: 99 %

## 2024-10-10 DIAGNOSIS — E11.9 TYPE 2 DIABETES MELLITUS WITHOUT COMPLICATION, WITHOUT LONG-TERM CURRENT USE OF INSULIN (HCC): ICD-10-CM

## 2024-10-10 DIAGNOSIS — E04.2 MULTINODULAR GOITER: ICD-10-CM

## 2024-10-10 DIAGNOSIS — I10 ESSENTIAL (PRIMARY) HYPERTENSION: ICD-10-CM

## 2024-10-10 DIAGNOSIS — Z23 NEEDS FLU SHOT: Primary | ICD-10-CM

## 2024-10-10 SDOH — ECONOMIC STABILITY: FOOD INSECURITY: WITHIN THE PAST 12 MONTHS, YOU WORRIED THAT YOUR FOOD WOULD RUN OUT BEFORE YOU GOT MONEY TO BUY MORE.: NEVER TRUE

## 2024-10-10 SDOH — ECONOMIC STABILITY: FOOD INSECURITY: WITHIN THE PAST 12 MONTHS, THE FOOD YOU BOUGHT JUST DIDN'T LAST AND YOU DIDN'T HAVE MONEY TO GET MORE.: NEVER TRUE

## 2024-10-10 SDOH — ECONOMIC STABILITY: INCOME INSECURITY: HOW HARD IS IT FOR YOU TO PAY FOR THE VERY BASICS LIKE FOOD, HOUSING, MEDICAL CARE, AND HEATING?: NOT HARD AT ALL

## 2024-10-10 ASSESSMENT — PATIENT HEALTH QUESTIONNAIRE - PHQ9
SUM OF ALL RESPONSES TO PHQ QUESTIONS 1-9: 0
1. LITTLE INTEREST OR PLEASURE IN DOING THINGS: NOT AT ALL
SUM OF ALL RESPONSES TO PHQ QUESTIONS 1-9: 0
SUM OF ALL RESPONSES TO PHQ9 QUESTIONS 1 & 2: 0
2. FEELING DOWN, DEPRESSED OR HOPELESS: NOT AT ALL

## 2024-10-10 NOTE — PROGRESS NOTES
\"Have you been to the ER, urgent care clinic since your last visit?  Hospitalized since your last visit?\"    NO    “Have you seen or consulted any other health care providers outside our system since your last visit?”    NO      “Have you had a diabetic eye exam?”    Scheduled December    No diabetic eye exam on file

## 2024-10-11 LAB
ALBUMIN SERPL-MCNC: 4.5 G/DL (ref 3.8–4.9)
ALP SERPL-CCNC: 73 IU/L (ref 44–121)
ALT SERPL-CCNC: 17 IU/L (ref 0–32)
APPEARANCE UR: CLEAR
AST SERPL-CCNC: 21 IU/L (ref 0–40)
BACTERIA #/AREA URNS HPF: NORMAL /[HPF]
BASOPHILS # BLD AUTO: 0 X10E3/UL (ref 0–0.2)
BASOPHILS NFR BLD AUTO: 1 %
BILIRUB SERPL-MCNC: 0.3 MG/DL (ref 0–1.2)
BILIRUB UR QL STRIP: NEGATIVE
BUN SERPL-MCNC: 22 MG/DL (ref 6–24)
BUN/CREAT SERPL: 26 (ref 9–23)
CALCIUM SERPL-MCNC: 9.7 MG/DL (ref 8.7–10.2)
CASTS URNS QL MICRO: NORMAL /LPF
CHLORIDE SERPL-SCNC: 101 MMOL/L (ref 96–106)
CO2 SERPL-SCNC: 24 MMOL/L (ref 20–29)
COLOR UR: YELLOW
CREAT SERPL-MCNC: 0.85 MG/DL (ref 0.57–1)
EGFRCR SERPLBLD CKD-EPI 2021: 79 ML/MIN/1.73
EOSINOPHIL # BLD AUTO: 0.1 X10E3/UL (ref 0–0.4)
EOSINOPHIL NFR BLD AUTO: 2 %
EPI CELLS #/AREA URNS HPF: NORMAL /HPF (ref 0–10)
ERYTHROCYTE [DISTWIDTH] IN BLOOD BY AUTOMATED COUNT: 13.1 % (ref 11.7–15.4)
GLOBULIN SER CALC-MCNC: 2.2 G/DL (ref 1.5–4.5)
GLUCOSE SERPL-MCNC: 95 MG/DL (ref 70–99)
GLUCOSE UR QL STRIP: NEGATIVE
HBA1C MFR BLD: 6.4 % (ref 4.8–5.6)
HCT VFR BLD AUTO: 35.1 % (ref 34–46.6)
HGB BLD-MCNC: 11.5 G/DL (ref 11.1–15.9)
HGB UR QL STRIP: NEGATIVE
IMM GRANULOCYTES # BLD AUTO: 0 X10E3/UL (ref 0–0.1)
IMM GRANULOCYTES NFR BLD AUTO: 0 %
KETONES UR QL STRIP: NEGATIVE
LEUKOCYTE ESTERASE UR QL STRIP: NEGATIVE
LYMPHOCYTES # BLD AUTO: 2.7 X10E3/UL (ref 0.7–3.1)
LYMPHOCYTES NFR BLD AUTO: 44 %
MCH RBC QN AUTO: 30.9 PG (ref 26.6–33)
MCHC RBC AUTO-ENTMCNC: 32.8 G/DL (ref 31.5–35.7)
MCV RBC AUTO: 94 FL (ref 79–97)
MICRO URNS: NORMAL
MICRO URNS: NORMAL
MONOCYTES # BLD AUTO: 0.6 X10E3/UL (ref 0.1–0.9)
MONOCYTES NFR BLD AUTO: 9 %
NEUTROPHILS # BLD AUTO: 2.7 X10E3/UL (ref 1.4–7)
NEUTROPHILS NFR BLD AUTO: 44 %
NITRITE UR QL STRIP: NEGATIVE
PH UR STRIP: 6.5 [PH] (ref 5–7.5)
PLATELET # BLD AUTO: 291 X10E3/UL (ref 150–450)
POTASSIUM SERPL-SCNC: 4.1 MMOL/L (ref 3.5–5.2)
PROT SERPL-MCNC: 6.7 G/DL (ref 6–8.5)
PROT UR QL STRIP: NEGATIVE
RBC # BLD AUTO: 3.72 X10E6/UL (ref 3.77–5.28)
RBC #/AREA URNS HPF: NORMAL /HPF (ref 0–2)
SODIUM SERPL-SCNC: 141 MMOL/L (ref 134–144)
SP GR UR STRIP: 1.01 (ref 1–1.03)
T4 FREE SERPL-MCNC: 1.25 NG/DL (ref 0.82–1.77)
TSH SERPL DL<=0.005 MIU/L-ACNC: 0.73 UIU/ML (ref 0.45–4.5)
URINALYSIS REFLEX: NORMAL
UROBILINOGEN UR STRIP-MCNC: 0.2 MG/DL (ref 0.2–1)
WBC # BLD AUTO: 6.1 X10E3/UL (ref 3.4–10.8)
WBC #/AREA URNS HPF: NORMAL /HPF (ref 0–5)

## 2025-02-07 DIAGNOSIS — E78.00 PURE HYPERCHOLESTEROLEMIA, UNSPECIFIED: Primary | ICD-10-CM

## 2025-02-07 NOTE — TELEPHONE ENCOUNTER
atorvastatin (LIPITOR) 10 MG tablet    Refill to [facility] #[phone]      Pt is out of medication.  Can she get today?   Pharm has tried to get for over a week.

## 2025-02-07 NOTE — TELEPHONE ENCOUNTER
I let the patient know, her refill request was going to her previous doctor. I will forward the refill request to Dr. Morales. Patient verbalized understanding of information discussed w/ no further questions at this time.

## 2025-02-09 RX ORDER — ATORVASTATIN CALCIUM 10 MG/1
10 TABLET, FILM COATED ORAL DAILY
Qty: 90 TABLET | Refills: 1 | Status: SHIPPED | OUTPATIENT
Start: 2025-02-09

## 2025-03-10 SDOH — ECONOMIC STABILITY: TRANSPORTATION INSECURITY
IN THE PAST 12 MONTHS, HAS LACK OF TRANSPORTATION KEPT YOU FROM MEETINGS, WORK, OR FROM GETTING THINGS NEEDED FOR DAILY LIVING?: NO

## 2025-03-10 SDOH — ECONOMIC STABILITY: FOOD INSECURITY: WITHIN THE PAST 12 MONTHS, YOU WORRIED THAT YOUR FOOD WOULD RUN OUT BEFORE YOU GOT MONEY TO BUY MORE.: NEVER TRUE

## 2025-03-10 SDOH — ECONOMIC STABILITY: FOOD INSECURITY: WITHIN THE PAST 12 MONTHS, THE FOOD YOU BOUGHT JUST DIDN'T LAST AND YOU DIDN'T HAVE MONEY TO GET MORE.: NEVER TRUE

## 2025-03-10 SDOH — ECONOMIC STABILITY: INCOME INSECURITY: IN THE LAST 12 MONTHS, WAS THERE A TIME WHEN YOU WERE NOT ABLE TO PAY THE MORTGAGE OR RENT ON TIME?: NO

## 2025-03-10 SDOH — ECONOMIC STABILITY: TRANSPORTATION INSECURITY
IN THE PAST 12 MONTHS, HAS THE LACK OF TRANSPORTATION KEPT YOU FROM MEDICAL APPOINTMENTS OR FROM GETTING MEDICATIONS?: NO

## 2025-03-11 ENCOUNTER — OFFICE VISIT (OUTPATIENT)
Age: 60
End: 2025-03-11
Payer: COMMERCIAL

## 2025-03-11 VITALS
TEMPERATURE: 98 F | DIASTOLIC BLOOD PRESSURE: 73 MMHG | HEART RATE: 73 BPM | WEIGHT: 152.6 LBS | SYSTOLIC BLOOD PRESSURE: 127 MMHG | HEIGHT: 66 IN | RESPIRATION RATE: 16 BRPM | OXYGEN SATURATION: 99 % | BODY MASS INDEX: 24.53 KG/M2

## 2025-03-11 DIAGNOSIS — R09.81 NASAL SINUS CONGESTION: Primary | ICD-10-CM

## 2025-03-11 DIAGNOSIS — I10 ESSENTIAL (PRIMARY) HYPERTENSION: ICD-10-CM

## 2025-03-11 DIAGNOSIS — J30.1 SEASONAL ALLERGIC RHINITIS DUE TO POLLEN: ICD-10-CM

## 2025-03-11 DIAGNOSIS — R73.03 PREDIABETES: ICD-10-CM

## 2025-03-11 PROBLEM — E11.9 TYPE 2 DIABETES MELLITUS (HCC): Status: RESOLVED | Noted: 2024-04-11 | Resolved: 2025-03-11

## 2025-03-11 PROCEDURE — 3074F SYST BP LT 130 MM HG: CPT | Performed by: INTERNAL MEDICINE

## 2025-03-11 PROCEDURE — 3078F DIAST BP <80 MM HG: CPT | Performed by: INTERNAL MEDICINE

## 2025-03-11 PROCEDURE — 99213 OFFICE O/P EST LOW 20 MIN: CPT | Performed by: INTERNAL MEDICINE

## 2025-03-11 RX ORDER — VALACYCLOVIR HYDROCHLORIDE 500 MG/1
TABLET, FILM COATED ORAL
COMMUNITY
Start: 2024-12-06

## 2025-03-11 RX ORDER — FEXOFENADINE HCL 60 MG/1
TABLET, FILM COATED ORAL
COMMUNITY

## 2025-03-11 SDOH — ECONOMIC STABILITY: FOOD INSECURITY: WITHIN THE PAST 12 MONTHS, THE FOOD YOU BOUGHT JUST DIDN'T LAST AND YOU DIDN'T HAVE MONEY TO GET MORE.: NEVER TRUE

## 2025-03-11 SDOH — ECONOMIC STABILITY: FOOD INSECURITY: WITHIN THE PAST 12 MONTHS, YOU WORRIED THAT YOUR FOOD WOULD RUN OUT BEFORE YOU GOT MONEY TO BUY MORE.: NEVER TRUE

## 2025-03-11 ASSESSMENT — PATIENT HEALTH QUESTIONNAIRE - PHQ9
SUM OF ALL RESPONSES TO PHQ QUESTIONS 1-9: 0
2. FEELING DOWN, DEPRESSED OR HOPELESS: NOT AT ALL
1. LITTLE INTEREST OR PLEASURE IN DOING THINGS: NOT AT ALL
SUM OF ALL RESPONSES TO PHQ QUESTIONS 1-9: 0

## 2025-03-11 NOTE — PATIENT INSTRUCTIONS
Would take allegra daily for next 1-2 weeks.  Would start astelin/astepro nasal spray daily for next 1-2 weeks.

## 2025-03-11 NOTE — PROGRESS NOTES
Graciela Caro is a 60 y.o. female who presents for evaluation of sinus pressure, drainage, bilateral ear fullness.  Typically follows with dr scott aponte, last saw her oct 10, 2024.  Overall healthy, but has been struggling with above symptoms now for about 5-6 weeks.   Took allegra on occasion.  Denies fevers.  Having some night time chills, causing her to put on a lighter night shirt.    Having to blow more mucus from her nose most mornings.        ROS:  Constitutional: negative for fevers, chills, anorexia and weight loss  Eyes:   negative for visual disturbance and irritation  ENT:   negative for tinnitus,sore throat,nasal congestion,ear pain,hoarseness  Respiratory:  negative for cough, hemoptysis, dyspnea,wheezing  CV:   negative for chest pain, palpitations, lower extremity edema  GI:   negative for nausea, vomiting, diarrhea, abdominal pain,melena  Genitourinary: negative for frequency, dysuria and hematuria  Musculoskel: negative for myalgias, arthralgias, back pain, muscle weakness, joint pain  Neurological:  negative for headaches, dizziness, focal weakness, numbness  Psychiatric:     Negative for depression or anxiety      Past Medical History:   Diagnosis Date    Anxiety     Arthritis     Calculus of kidney 01/01/2014    seen on ultrasound - no sxs ever    Environmental allergies     GERD (gastroesophageal reflux disease)     hiatal hernia    Hypertension        Past Surgical History:   Procedure Laterality Date    COLONOSCOPY  12/2011    [facility] f/u 10 years    NEUROLOGICAL SURGERY  2001    lumbar dent., redo w/L5-S1 fusion (11/02-[place])       Family History   Problem Relation Age of Onset    Hypertension Father     Diabetes Father     Cancer Father         prostate    Arthritis Mother        Social History     Socioeconomic History    Marital status:      Spouse name: Not on file    Number of children: Not on file    Years of education: Not on file    Highest education level: Not on file

## 2025-04-16 RX ORDER — AMLODIPINE BESYLATE 5 MG/1
5 TABLET ORAL DAILY
Qty: 90 TABLET | Refills: 3 | Status: SHIPPED | OUTPATIENT
Start: 2025-04-16

## 2025-04-17 ENCOUNTER — OFFICE VISIT (OUTPATIENT)
Age: 60
End: 2025-04-17
Payer: COMMERCIAL

## 2025-04-17 VITALS
HEIGHT: 66 IN | DIASTOLIC BLOOD PRESSURE: 73 MMHG | HEART RATE: 69 BPM | OXYGEN SATURATION: 99 % | BODY MASS INDEX: 24.14 KG/M2 | RESPIRATION RATE: 18 BRPM | SYSTOLIC BLOOD PRESSURE: 128 MMHG | TEMPERATURE: 97.6 F | WEIGHT: 150.2 LBS

## 2025-04-17 DIAGNOSIS — I10 PRIMARY HYPERTENSION: ICD-10-CM

## 2025-04-17 DIAGNOSIS — E11.9 TYPE 2 DIABETES MELLITUS WITHOUT COMPLICATION, WITHOUT LONG-TERM CURRENT USE OF INSULIN (HCC): ICD-10-CM

## 2025-04-17 DIAGNOSIS — Z00.00 ANNUAL PHYSICAL EXAM: Primary | ICD-10-CM

## 2025-04-17 DIAGNOSIS — E78.2 MIXED HYPERLIPIDEMIA: ICD-10-CM

## 2025-04-17 DIAGNOSIS — M79.10 MYALGIA: ICD-10-CM

## 2025-04-17 DIAGNOSIS — R10.13 EPIGASTRIC PAIN: ICD-10-CM

## 2025-04-17 PROCEDURE — 3078F DIAST BP <80 MM HG: CPT | Performed by: FAMILY MEDICINE

## 2025-04-17 PROCEDURE — 3074F SYST BP LT 130 MM HG: CPT | Performed by: FAMILY MEDICINE

## 2025-04-17 PROCEDURE — 99396 PREV VISIT EST AGE 40-64: CPT | Performed by: FAMILY MEDICINE

## 2025-04-17 RX ORDER — MAGNESIUM 30 MG
30 TABLET ORAL 2 TIMES DAILY
COMMUNITY

## 2025-04-17 NOTE — PROGRESS NOTES
\"Have you been to the ER, urgent care clinic since your last visit?  Hospitalized since your last visit?\"    NO    “Have you seen or consulted any other health care providers outside our system since your last visit?”    NO    Have you had a mammogram?”   December 2024/ [facility]    Date of last Mammogram: 11/9/2022       “Have you had a diabetic eye exam?”    December 2024/ [facility]/ Dr. English    No diabetic eye exam on file

## 2025-04-18 LAB
ALBUMIN SERPL-MCNC: 4.8 G/DL (ref 3.8–4.9)
ALP SERPL-CCNC: 83 IU/L (ref 44–121)
ALT SERPL-CCNC: 19 IU/L (ref 0–32)
AST SERPL-CCNC: 23 IU/L (ref 0–40)
BASOPHILS # BLD AUTO: 0.1 X10E3/UL (ref 0–0.2)
BASOPHILS NFR BLD AUTO: 1 %
BILIRUB SERPL-MCNC: 0.5 MG/DL (ref 0–1.2)
BUN SERPL-MCNC: 25 MG/DL (ref 8–27)
BUN/CREAT SERPL: 31 (ref 12–28)
CALCIUM SERPL-MCNC: 10.3 MG/DL (ref 8.7–10.3)
CHLORIDE SERPL-SCNC: 100 MMOL/L (ref 96–106)
CHOLEST SERPL-MCNC: 250 MG/DL (ref 100–199)
CK SERPL-CCNC: 183 U/L (ref 32–182)
CO2 SERPL-SCNC: 27 MMOL/L (ref 20–29)
CREAT SERPL-MCNC: 0.8 MG/DL (ref 0.57–1)
EGFRCR SERPLBLD CKD-EPI 2021: 84 ML/MIN/1.73
EOSINOPHIL # BLD AUTO: 0.1 X10E3/UL (ref 0–0.4)
EOSINOPHIL NFR BLD AUTO: 2 %
ERYTHROCYTE [DISTWIDTH] IN BLOOD BY AUTOMATED COUNT: 13.5 % (ref 11.7–15.4)
GLOBULIN SER CALC-MCNC: 2.2 G/DL (ref 1.5–4.5)
GLUCOSE SERPL-MCNC: 102 MG/DL (ref 70–99)
HBA1C MFR BLD: 6.3 % (ref 4.8–5.6)
HCT VFR BLD AUTO: 39.5 % (ref 34–46.6)
HDLC SERPL-MCNC: 111 MG/DL
HGB BLD-MCNC: 12.7 G/DL (ref 11.1–15.9)
IMM GRANULOCYTES # BLD AUTO: 0 X10E3/UL (ref 0–0.1)
IMM GRANULOCYTES NFR BLD AUTO: 0 %
LDLC SERPL CALC-MCNC: 132 MG/DL (ref 0–99)
LYMPHOCYTES # BLD AUTO: 2.5 X10E3/UL (ref 0.7–3.1)
LYMPHOCYTES NFR BLD AUTO: 48 %
MCH RBC QN AUTO: 29.4 PG (ref 26.6–33)
MCHC RBC AUTO-ENTMCNC: 32.2 G/DL (ref 31.5–35.7)
MCV RBC AUTO: 91 FL (ref 79–97)
MONOCYTES # BLD AUTO: 0.4 X10E3/UL (ref 0.1–0.9)
MONOCYTES NFR BLD AUTO: 9 %
NEUTROPHILS # BLD AUTO: 2.1 X10E3/UL (ref 1.4–7)
NEUTROPHILS NFR BLD AUTO: 40 %
PLATELET # BLD AUTO: 303 X10E3/UL (ref 150–450)
POTASSIUM SERPL-SCNC: 4.7 MMOL/L (ref 3.5–5.2)
PROT SERPL-MCNC: 7 G/DL (ref 6–8.5)
RBC # BLD AUTO: 4.32 X10E6/UL (ref 3.77–5.28)
SODIUM SERPL-SCNC: 139 MMOL/L (ref 134–144)
TRIGL SERPL-MCNC: 47 MG/DL (ref 0–149)
VLDLC SERPL CALC-MCNC: 7 MG/DL (ref 5–40)
WBC # BLD AUTO: 5.2 X10E3/UL (ref 3.4–10.8)

## 2025-04-20 ENCOUNTER — HOSPITAL ENCOUNTER (OUTPATIENT)
Facility: HOSPITAL | Age: 60
Discharge: HOME | End: 2025-04-23
Attending: FAMILY MEDICINE
Payer: COMMERCIAL

## 2025-04-20 DIAGNOSIS — R10.13 EPIGASTRIC PAIN: ICD-10-CM

## 2025-04-20 PROCEDURE — 76700 US EXAM ABDOM COMPLETE: CPT

## 2025-04-21 ENCOUNTER — PATIENT MESSAGE (OUTPATIENT)
Age: 60
End: 2025-04-21

## 2025-04-22 ENCOUNTER — RESULTS FOLLOW-UP (OUTPATIENT)
Age: 60
End: 2025-04-22

## 2025-04-23 NOTE — PROGRESS NOTES
SUBJECTIVE:   Ms. Graciela Caro is a 60 y.o. female who is here for her annual physical.    Pt has concerns about her lipids and cholesterol medication.  She reports that she has been experiencing muscle pain and fatigue.  Despite her discomfort she has tried to maintain regular physical activity.  She also reports some epigastric discomfort.  She is up-to-date on her health maintenance.  She sees an endocrinologist regarding her thyroid.            At this time, she is otherwise doing well and has brought no other complaints to my attention today.  For a list of the medical issues addressed today, see the assessment and plan below.    PMH:   Past Medical History:   Diagnosis Date    Anxiety     Arthritis     Calculus of kidney 01/01/2014    seen on ultrasound - no sxs ever    Environmental allergies     GERD (gastroesophageal reflux disease)     hiatal hernia    Hypertension      PSH:  has a past surgical history that includes Colonoscopy (12/2011) and neurological surgery (2001).    All: is allergic to latex, naproxen, nitrofurantoin, amoxicillin, hydrochlorothiazide, and sulfa antibiotics.   MEDS:   Current Outpatient Medications   Medication Sig    magnesium 30 MG tablet Take 1 tablet by mouth 2 times daily Takes 240 mg 2 tabs at night    amLODIPine (NORVASC) 5 MG tablet TAKE 1 TABLET BY MOUTH EVERY DAY    fexofenadine (ALLEGRA ALLERGY) 60 MG tablet 0 (Patient taking differently: As needed)    valACYclovir (VALTREX) 500 MG tablet TAKE 1 TABLET BY MOUTH EVERY DAY FOR 5 DAYS AS NEEDED FOR EPISODIC OUTBREAKS    chlorthalidone (HYGROTON) 25 MG tablet Take 0.5 tablets by mouth daily    Multiple Vitamins-Minerals (MULTIVITAMIN ADULTS PO) 0    omeprazole (PRILOSEC) 40 MG delayed release capsule 1 capsule    lisinopril (PRINIVIL;ZESTRIL) 40 MG tablet TAKE 1 TABLET BY MOUTH EVERY DAY    NONFORMULARY Omega 3 with Vitamin D liquid     No current facility-administered medications for this visit.       FH: family history

## 2025-05-16 ENCOUNTER — TELEPHONE (OUTPATIENT)
Age: 60
End: 2025-05-16

## 2025-05-16 NOTE — TELEPHONE ENCOUNTER
Spoke to pt. She stated Specialist wants imaging sent to office. Imaged faxed to Dr. Srinivasan  office at this time.With confirmation.

## 2025-05-29 ENCOUNTER — TELEPHONE (OUTPATIENT)
Age: 60
End: 2025-05-29

## 2025-05-29 NOTE — TELEPHONE ENCOUNTER
Patient called in stating the pharmacy provided her with [facility] brand name amLODIPine (NORVASC) 5 MG tablet and she states this brand \"makes her feel funny\"

## 2025-06-03 ENCOUNTER — TRANSCRIBE ORDERS (OUTPATIENT)
Facility: HOSPITAL | Age: 60
End: 2025-06-03

## 2025-06-03 DIAGNOSIS — M54.12 CERVICAL RADICULITIS: ICD-10-CM

## 2025-06-03 DIAGNOSIS — M47.812 CERVICAL SPONDYLOSIS: Primary | ICD-10-CM

## 2025-06-18 ENCOUNTER — HOSPITAL ENCOUNTER (OUTPATIENT)
Facility: HOSPITAL | Age: 60
Discharge: HOME | End: 2025-06-21
Attending: PHYSICAL MEDICINE & REHABILITATION
Payer: COMMERCIAL

## 2025-06-18 DIAGNOSIS — M54.12 CERVICAL RADICULITIS: ICD-10-CM

## 2025-06-18 DIAGNOSIS — M47.812 CERVICAL SPONDYLOSIS: ICD-10-CM

## 2025-06-18 PROCEDURE — 72141 MRI NECK SPINE W/O DYE: CPT

## 2025-07-15 DIAGNOSIS — I10 PRIMARY HYPERTENSION: Primary | ICD-10-CM

## 2025-07-15 RX ORDER — LISINOPRIL 40 MG/1
40 TABLET ORAL DAILY
Qty: 90 TABLET | Refills: 3 | Status: SHIPPED | OUTPATIENT
Start: 2025-07-15

## 2025-07-15 NOTE — TELEPHONE ENCOUNTER
Future Appointments:  Future Appointments   Date Time Provider Department Center   10/2/2025  8:40 AM Negra Morales MD [facility] DE[facility]   4/20/2026  8:00 AM Negra Morales MD [facility] DE[facility]        Last Appointment With Me:  4/17/2025     Requested Prescriptions     Pending Prescriptions Disp Refills    lisinopril (PRINIVIL;ZESTRIL) 40 MG tablet 90 tablet 1     Sig: Take 1 tablet by mouth daily

## 2025-07-15 NOTE — TELEPHONE ENCOUNTER
Caller requests Refill of:    lisinopril (PRINIVIL;ZESTRIL) 40 MG tablet     Please send to:    [facility]/pharmacy #[id number] - [place], [place] - [address] - [place] [phone] - F [phone]  [address] [facility] [address]  Phone: [phone] Fax: [phone]         Visit / Appointment History:  Future Appointment at [facility]:  10/2/2025   Last Appointment With PCP:  4/17/2025       Caller confirmed instructions and dosages as correct.    Caller was advised that Meds will be refilled as soon as possible, however there can be a 48-72 business hour turn around on refill requests.